# Patient Record
Sex: MALE | Race: WHITE | NOT HISPANIC OR LATINO | Employment: OTHER | URBAN - METROPOLITAN AREA
[De-identification: names, ages, dates, MRNs, and addresses within clinical notes are randomized per-mention and may not be internally consistent; named-entity substitution may affect disease eponyms.]

---

## 2017-02-01 ENCOUNTER — GENERIC CONVERSION - ENCOUNTER (OUTPATIENT)
Dept: OTHER | Facility: OTHER | Age: 72
End: 2017-02-01

## 2017-02-21 ENCOUNTER — ALLSCRIPTS OFFICE VISIT (OUTPATIENT)
Dept: OTHER | Facility: OTHER | Age: 72
End: 2017-02-21

## 2017-02-21 DIAGNOSIS — R01.1 CARDIAC MURMUR: ICD-10-CM

## 2017-02-28 ENCOUNTER — ALLSCRIPTS OFFICE VISIT (OUTPATIENT)
Dept: OTHER | Facility: OTHER | Age: 72
End: 2017-02-28

## 2017-03-03 ENCOUNTER — HOSPITAL ENCOUNTER (OUTPATIENT)
Dept: NON INVASIVE DIAGNOSTICS | Facility: CLINIC | Age: 72
Discharge: HOME | End: 2017-03-03
Payer: COMMERCIAL

## 2017-03-03 DIAGNOSIS — R01.1 CARDIAC MURMUR: ICD-10-CM

## 2017-03-03 PROCEDURE — 93306 TTE W/DOPPLER COMPLETE: CPT

## 2017-03-05 ENCOUNTER — GENERIC CONVERSION - ENCOUNTER (OUTPATIENT)
Dept: OTHER | Facility: OTHER | Age: 72
End: 2017-03-05

## 2017-03-28 ENCOUNTER — HOSPITAL ENCOUNTER (OUTPATIENT)
Dept: RADIOLOGY | Facility: CLINIC | Age: 72
Discharge: HOME | End: 2017-03-28
Payer: COMMERCIAL

## 2017-03-28 ENCOUNTER — TRANSCRIBE ORDERS (OUTPATIENT)
Dept: MRI IMAGING | Facility: CLINIC | Age: 72
End: 2017-03-28

## 2017-03-28 ENCOUNTER — ALLSCRIPTS OFFICE VISIT (OUTPATIENT)
Dept: OTHER | Facility: OTHER | Age: 72
End: 2017-03-28

## 2017-03-28 DIAGNOSIS — M70.20 OLECRANON BURSITIS: ICD-10-CM

## 2017-03-28 PROCEDURE — 73080 X-RAY EXAM OF ELBOW: CPT

## 2017-04-20 ENCOUNTER — ALLSCRIPTS OFFICE VISIT (OUTPATIENT)
Dept: OTHER | Facility: OTHER | Age: 72
End: 2017-04-20

## 2017-07-20 ENCOUNTER — GENERIC CONVERSION - ENCOUNTER (OUTPATIENT)
Dept: OTHER | Facility: OTHER | Age: 72
End: 2017-07-20

## 2017-07-20 LAB — AMBIG ABBREV LP DEFAULT (HISTORICAL): NORMAL

## 2017-07-21 ENCOUNTER — GENERIC CONVERSION - ENCOUNTER (OUTPATIENT)
Dept: OTHER | Facility: OTHER | Age: 72
End: 2017-07-21

## 2017-07-21 LAB
CHOLEST SERPL-MCNC: 118 MG/DL (ref 100–199)
HBA1C MFR BLD HPLC: 4.7 % (ref 4.8–5.6)
HDLC SERPL-MCNC: 38 MG/DL
LDLC SERPL CALC-MCNC: 60 MG/DL (ref 0–99)
LYME IGG/IGM AB (HISTORICAL): <0.91 ISR (ref 0–0.9)
LYME IGM (HISTORICAL): <0.8 INDEX (ref 0–0.79)
TRIGL SERPL-MCNC: 101 MG/DL (ref 0–149)
VLDLC SERPL CALC-MCNC: 20 MG/DL (ref 5–40)

## 2017-07-27 LAB — PLEASE NOTE (HISTORICAL): NORMAL

## 2017-08-02 ENCOUNTER — ALLSCRIPTS OFFICE VISIT (OUTPATIENT)
Dept: OTHER | Facility: OTHER | Age: 72
End: 2017-08-02

## 2017-09-20 ENCOUNTER — ALLSCRIPTS OFFICE VISIT (OUTPATIENT)
Dept: OTHER | Facility: OTHER | Age: 72
End: 2017-09-20

## 2018-01-11 NOTE — RESULT NOTES
Verified Results  ECHO COMPLETE WITH CONTRAST IF INDICATED [id number] 08:17AM Shweta Chandler Order Number: [id number]    - Patient Instructions: To schedule this appointment, please contact Central Scheduling at [phone]  Test Name Result Flag Reference   ECHO COMPLETE WITH CONTRAST IF INDICATED (Report)     [facility], [address] [place] [address]   [phone]     Transthoracic Echocardiogram   2D, M-mode, Doppler, and Color Doppler     Study date: 03-Mar-2017     Patient: Bobby Torres   MR number: [id number]   Account number: [de-identified]   : 1945   Age: 66 years   Gender: Male   Status: Outpatient   Location: [facility]   Height: 72 in   Weight: 216 lb   BP: 140/ 80 mmHg     Indications: Murmur  Diagnoses: R01 1 - Cardiac murmur, unspecified     Sonographer: Molina RCS   Primary Physician: Katherine Sparks MD   Referring Physician: Solitario Kessler MD   Group: [facility]   Interpreting Physician: Solitario Kessler MD     SUMMARY     LEFT VENTRICLE:   Although no diagnostic regional wall motion abnormality was identified, this possibility cannot be completely excluded on the basis of this study  LEFT ATRIUM:   The atrium was mildly dilated  AORTIC VALVE:   There was mild stenosis  Peak gradient 21 mm Hg and mean 12 mm Hg  TRICUSPID VALVE:   There was trace regurgitation  SUMMARY MEASUREMENTS   Unspecified Scan Mode measurements:   Aortic Valve:  HR was 52 {H  B }/min  Left Ventricle:  HR was 58 {H  B }/min  HISTORY: PRIOR HISTORY: Risk factors: hypertension and medication-treated hypercholesterolemia  PROCEDURE: The study was performed in the [address] [place] [address]  This was a routine study  The transthoracic approach was used  The study included complete 2D imaging, M-mode, complete spectral Doppler, and color Doppler  This   was a technically difficult study  LEFT VENTRICLE: Size was normal  Ejection fraction was estimated to be 60 %  Although no diagnostic regional wall motion abnormality was identified, this possibility cannot be completely excluded on the basis of this study  DOPPLER: Left   ventricular diastolic function parameters were normal      RIGHT VENTRICLE: The size was normal  Systolic function was normal  Wall thickness was normal      LEFT ATRIUM: The atrium was mildly dilated  RIGHT ATRIUM: Size was normal      MITRAL VALVE: There was annular calcification  Valve structure was normal  There was normal leaflet separation  DOPPLER: The transmitral velocity was within the normal range  There was no evidence for stenosis  There was no regurgitation  AORTIC VALVE: The valve was probably trileaflet  Leaflets exhibited calcification  DOPPLER: There was mild stenosis  Peak gradient 21 mm Hg and mean 12 mm Hg  TRICUSPID VALVE: The valve structure was normal  There was normal leaflet separation  DOPPLER: The transtricuspid velocity was within the normal range  There was no evidence for stenosis  There was trace regurgitation  PULMONIC VALVE: Leaflets exhibited normal thickness, no calcification, and normal cuspal separation  DOPPLER: The transpulmonic velocity was within the normal range  There was no regurgitation  PERICARDIUM: There was no pericardial effusion  The pericardium was normal in appearance  AORTA: The root exhibited normal size  SYSTEM MEASUREMENT TABLES     Apical four chamber   4 chamber Left Atrium Volume Index; Planimetry; End Systole; Apical four chamber;: 22 39 cm2   Left Ventricular End Diastolic Volume; Method of Disks, Single Plane; Apical four chamber;: 109 7 ml   Left Ventricular End Systolic Volume; Method of Disks, Single Plane; Apical four chamber;: 35 9 ml   Right Atrium Systolic Area; Planimetry; End Systole; Apical four chamber;: 16 19 cm2   Right Ventricular Internal Diastolic Dimension;  End Diastole; Apical four chamber;: 26 3 mm   TAPSE: 19 2 mm     Unspecified Scan Mode   Aortic Root Diameter; End Systole;: 32 6 mm   Aortic valve Area; Continuity Equation by Velocity Time Integral; Systole;: 1 6 cm2   Gradient Pressure, Peak; Simplified Bernoulli; Antegrade Flow; Systole;: 21 3 mm[Hg]   Gradient pressure, average; Simplified Bernoulli; Antegrade Flow; Systole;: 12 3 mm[Hg]   HR: 52 {H  B }/min   Left atrial diameter; End Diastole;: 48 3 mm   Cardiac Output; Systole;: 4 69 L/min   Cardiac Output; Teichholz; Systole;: 4 34 L/min   HR: 58 {H  B }/min   Heart rate; Teichholz;: 46 {H  B }/min   Interventricular Septum Diastolic Thickness; Teichholz; End Diastole;: 10 8 mm   Left Ventricle Internal End Diastolic Dimension; Teichholz;: 52 mm   Left Ventricle Internal Systolic Dimension; Teichholz; End Systole;: 33 1 mm   Left Ventricle Mass; Mass AVCube with Teichholz; End Diastole;: 230 g   Left Ventricle Posterior Wall Diastolic Thickness; Teichholz; End Diastole;: 11 9 mm   Left Ventricular Ejection Fraction; Teichholz;: 65 6 %   Left Ventricular End Diastolic Volume; Teichholz;: 129 5 ml   Left Ventricular End Systolic Volume; Teichholz;: 44 5 ml   Left Ventricular Fractional Shortening;: 36 3 %   Stroke volume; Systole;: 90 1 ml   Stroke volume;  Teichholz; Systole;: 85 ml   Mitral Valve Area; Area by Pressure Half-Time; Systole;: 1 61 cm2   Mitral Valve E to A Ratio; Systole;: 0 71     [facility] Accredited [facility]ch[facility]     Prepared and electronically signed by     Alaina Dueñas MD   Signed 03-Mar-2017 17:31:04

## 2018-01-13 VITALS
HEART RATE: 60 BPM | BODY MASS INDEX: 30.07 KG/M2 | WEIGHT: 222 LBS | HEIGHT: 72 IN | SYSTOLIC BLOOD PRESSURE: 122 MMHG | DIASTOLIC BLOOD PRESSURE: 74 MMHG

## 2018-01-13 VITALS
HEIGHT: 72 IN | HEART RATE: 56 BPM | DIASTOLIC BLOOD PRESSURE: 86 MMHG | SYSTOLIC BLOOD PRESSURE: 138 MMHG | BODY MASS INDEX: 31.29 KG/M2 | WEIGHT: 231 LBS | OXYGEN SATURATION: 96 %

## 2018-01-13 NOTE — PROGRESS NOTES
Plan  Health Maintenance    · Aspirin EC Low Dose 81 MG Oral Tablet Delayed Release; TAKE 1 TABLET DAILY  Low serum copper for age    · Copper Caps 2 MG Oral Capsule; as directed  Screen for colon cancer    · COLONOSCOPY ; every 10 years; Next 05ZAS2641; Status:Active    Discussion/Summary    AWV COMPLETED  PT DOING WELL  History of Present Illness  HPI: AVW   Welcome to Medicare and Wellness Visits: The patient is being seen for the subsequent annual wellness visit  Medicare Screening and Risk Factors   Hospitalizations: no previous hospitalizations  Medicare Screening Tests Risk Questions   Abdominal aortic aneurysm risk assessment: over 72years of age  Osteoporosis risk assessment: , over 48years of age and low calcium diet  HIV risk assessment: none indicated  Drug and Alcohol Use: The patient has never smoked cigarettes  The patient reports rare alcohol use  He has previously used illicit drugs  He reports using marijuana  Diet and Physical Activity: Current diet includes unhealthy food choices, frequent junk food, 1 servings of fruit per day, 1 servings of vegetables per day, 1 servings of meat per day, 1 servings of whole grains per day, 1 servings of dairy products per day, 2 cups of coffee per day, 0 cups of tea per day, 0 cans of regular soda per day and 0 cans of diet soda per day  He exercises daily  Exercise: walking 15-20 minutes per day  Mood Disorder and Cognitive Impairment Screening: PHQ-9 Depression Scale   Over the past 2 weeks, how often have you been bothered by the following problems? 1 ) Little interest or pleasure in doing things? Not at all    2 ) Feeling down, depressed or hopeless? Several days  3 ) Trouble falling asleep or sleeping too much? Nearly every day  4 ) Feeling tired or having little energy? Nearly every day  5 ) Poor appetite or overeating?  Not at all    6 ) Feeling bad about yourself, or that you are a failure, or have let yourself or your family down? Not at all    7 ) Trouble concentrating on things, such as reading a newspaper or watching television? Not at all    8 ) Moving or speaking so slowly that other people could have noticed, or the opposite, moving or speaking faster than usual? Not at all  TOTAL SCORE: 7, severity of depression is mild  How difficult have these problems made it for you to do your work, take care of things at home, or get along with people? Not at all  Functional Ability/Level of Safety: Hearing is normal in the right ear, slightly decreased in the left ear and a hearing aid is not used  The patient is currently able to do activities of daily living without limitations, able to do instrumental activities of daily living without limitations, able to participate in social activities without limitations and able to drive without limitations  Activities of daily living details: does not need help using the phone, no transportation help needed, does not need help shopping, no meal preparation help needed, does not need help doing housework, does not need help doing laundry, does not need help managing medications and does not need help managing money  Fall risk factors: The patient fell 1 times in the past 12 months  lost balance  Home safety risk factors:  loose rugs, but no unfamiliar surroundings, no poor household lighting, no uneven floors, no household clutter, grab bars in the bathroom and handrails on the stairs  Advance Directives: Advance directives: no living will, no durable power of  for health care directives and no advance directives  Co-Managers and Medical Equipment/Suppliers: See Patient Care Team   Preventive Quality Program 65 and Older: Falls Risk: The patient fell 1 times in the past 12 months  lost balance   Urinary Incontinence Symptoms includes: urinary incontinence and urinary urgency        Patient Care Team    Care Team Member Role Specialty Office Number   Saisimon Cole M D  Specialist Cardiology [phone]     Active Problems    1  Abnormal blood chemistry (790 6) (R79 9)   2  Allergic rhinitis (477 9) (J30 9)   3  LUKASZ positive (795 79) (R76 8)   4  Aortic stenosis (424 1) (I35 0)   5  Atherosclerotic heart disease of native coronary artery without angina pectoris (414 01)   (I25 10)   6  Benign essential hypertension (401 1) (I10)   7  Benign prostatic hypertrophy (600 00) (N40 0)   8  BPH with obstruction/lower urinary tract symptoms (600 01,599 69) (N40 1,N13 8)   9  Cardiac murmur (785 2) (R01 1)   10  Chest pain (786 50) (R07 9)   11  Chronic maxillary sinusitis (473 0) (J32 0)   12  Decreased libido (799 81) (R68 82)   13  Dyslipidemia (272 4) (E78 5)   14  Encounter for monitoring antiplatelet therapy (I03 39,W76 21) (Z51 81,Z79 02)   15  Encounter for special screening examination for genitourinary disorder (V81 6) (Z13 89)   16  Enlarged prostate (600 00) (N40 0)   17  Erectile dysfunction of non-organic origin (302 72) (F52 21)   18  Fatigue (780 79) (R53 83)   19  Gait instability (781 2) (R26 81)   20  Hyperlipidemia (272 4) (E78 5)   21  Hypertension (401 9) (I10)   22  Impaired fasting glucose (790 21) (R73 01)   23  Impaired functional mobility, balance, gait, and endurance (V49 89) (Z74 09)   24  Insomnia (780 52) (G47 00)   25  Laceration of right hand, initial encounter (882 0) (S61 411A)   26  Lightheadedness (780 4) (R42)   27  Male erectile dysfunction (607 84) (N52 9)   28  Onychomycosis (110 1) (B35 1)   29  Overweight (278 02) (E66 3)   30  Pulmonary nodule seen on imaging study (793 11) (R91 1)   31  Screen for colon cancer (V76 51) (Z12 11)   32  Screening for genitourinary condition (V81 6) (Z13 89)   33  Skin sensation disturbance (782 0) (R20 9)   34  Stroke (434 91) (I63 9)   35   Urinary incontinence (788 30) (R32)    Past Medical History    · History of Erectile dysfunction of non-organic origin (302 72) (F52 21)    Surgical History    · History of CABG   · History of Tonsillectomy    Family History  Mother    · Family history of Coronary Artery Disease (V17 49)   · Family history of Hypertension (V17 49)   · Family history of Stroke Syndrome (V17 1)  Father    · Family history of Aortic Valve Replacement   · Family history of Arthritis (V17 7)   · Family history of CABG  Brother    · Family history of Hypertension (V17 49)   · Family history of Stroke Syndrome (V17 1)    Social History    · Caffeine Use   · Former smoker (V15 82) (C25 114)   · Marital History - Single   · Tobacco Non-user    Current Meds   1  AmLODIPine Besylate 5 MG Oral Tablet; TAKE 1 TABLET DAILY; Therapy: 16JVD7663 to (Evaluate:65Qgr6957)  Requested for: 61QZZ7491; Last   Rx:12Jan2017 Ordered   2  Atorvastatin Calcium 40 MG Oral Tablet; Take 1 tablet daily; Therapy: 34JIJ3954 to (Evaluate:48Cgh1146)  Requested for: 10Aug2016; Last   Rx:10Aug2016 Ordered   3  Carvedilol 12 5 MG Oral Tablet; take one tablet by mouth twice daily; Therapy: 45TAY7060 to (Last Rx:10Aug2016)  Requested for: 10Aug2016 Ordered   4  Clopidogrel Bisulfate 75 MG Oral Tablet; TAKE 1 TABLET DAILY; Therapy: 60FPE4217 to (Evaluate:71Fxf3802)  Requested for: 10Aug2016; Last   Rx:10Aug2016 Ordered   5  [facility] Natural Fish Oil CAPS; Take as directed Recorded   6  Finasteride 5 MG Oral Tablet; TAKE 1 TABLET AT BEDTIME; Therapy: 85Dpf1646 to (Evaluate:50Hwe4102)  Requested for: 10Aug2016; Last   Rx:10Aug2016 Ordered   7  Mirtazapine 7 5 MG Oral Tablet; TAKE 1 TABLET AT BEDTIME; Therapy: 83Mnj0094 to (Evaluate:23Mar2017); Last Rx:25Tro8175 Ordered   8  Valsartan-Hydrochlorothiazide 320-25 MG Oral Tablet; Take 1 tablet daily; Therapy: 21Jun2016 to (Last Rx:56Vaj2633)  Requested for: 88AMK1263 Ordered   9  Vitamin D-3 5000 UNIT TABS; Therapy: 53DRU2129 to Recorded    Allergies    1   No Known Drug Allergies    Immunizations   1 2    Influenza  2014 20-Sep-2016    Pneumo Other  2014     Tdap  21-Mar-2016 Childhood Zoster  Never      Results/Data  Health Maintenance Flow Sheet 68FUI1545 01:09PM      Test Name Result Flag Reference   Colonoscopy pt stat never       *VB - Urinary Incontinence Screen (Dx Z13 89 Screen for UI) 76HBJ3497 01:07PM Justino Martini     Test Name Result Flag Reference   Urinary Incontinence Assessment 02/28/2017 yes       Falls Risk Assessment (Dx Z13 89 Screen for Neurologic Disorder) 20FIZ0588 12:53PM User, Ahs     Test Name Result Flag Reference   Falls Risk      No falls in the past year     PHQ-2 Adult Depression Screening 82Vnc9671 12:52PM User, Ahs     Test Name Result Flag Reference   PHQ-2 Adult Depression Score 2     Over the last two weeks, how often have you been bothered by any of the following problems? Little interest or pleasure in doing things: Several days - 1  Feeling down, depressed, or hopeless: Several days - 1   PHQ-2 Adult Depression Screening Negative         Health Management  Screen for colon cancer   COLONOSCOPY; every 10 years; Next Due: 41Wvh5871; Overdue  Health Maintenance   Medicare Annual Wellness Visit; every 1 year; Next Due: 66ZAH6026;  Overdue    Signatures   Electronically signed by : Abbe House, [facility]; Feb 28 2017  1:47PM EST                       (Author)    Electronically signed by : CORINNA Izaguirre ; Feb 28 2017  6:05PM EST

## 2018-01-14 VITALS
SYSTOLIC BLOOD PRESSURE: 124 MMHG | OXYGEN SATURATION: 96 % | WEIGHT: 231.13 LBS | BODY MASS INDEX: 31.31 KG/M2 | DIASTOLIC BLOOD PRESSURE: 66 MMHG | HEART RATE: 54 BPM | HEIGHT: 72 IN

## 2018-01-14 VITALS
HEART RATE: 71 BPM | BODY MASS INDEX: 30.12 KG/M2 | HEIGHT: 72 IN | SYSTOLIC BLOOD PRESSURE: 152 MMHG | DIASTOLIC BLOOD PRESSURE: 88 MMHG | WEIGHT: 222.38 LBS

## 2018-01-14 VITALS
BODY MASS INDEX: 29.26 KG/M2 | DIASTOLIC BLOOD PRESSURE: 80 MMHG | SYSTOLIC BLOOD PRESSURE: 140 MMHG | HEIGHT: 72 IN | HEART RATE: 66 BPM | WEIGHT: 216 LBS | OXYGEN SATURATION: 98 %

## 2018-01-16 NOTE — RESULT NOTES
Verified Results  (LC) Lyme Ab/Western Blot Reflex 23APT3973 11:46AM Jarred Carmenza     Test Name Result Flag Reference   Lyme IgG/IgM Ab <0 91 ISR  0 00-0 90   Negative         <0 91                                                 Equivocal  0 91 - 1 09                                                 Positive         >1 09   Lyme Disease Ab, Quant, IgM <0 80 index  0 00-0 79   Negative         <0 80                                                 Equivocal  0 80 - 1 19                                                 Positive         >1 19                  IgM levels may peak at 3-6 weeks post infection, then                  gradually decline

## 2018-01-16 NOTE — RESULT NOTES
Verified Results  (1) COMPREHENSIVE METABOLIC PANEL 92YMR1083 95:60MI Gail Cook     Test Name Result Flag Reference   Glucose, Serum 94 mg/dL  65-99   BUN 20 mg/dL  8-27   Creatinine, Serum 0 93 mg/dL  0 76-1 27   eGFR If NonAfricn Am 81 mL/min/1 73  >59   eGFR If Africn Am 94 mL/min/1 73  >59   BUN/Creatinine Ratio 22  10-22   Sodium, Serum 141 mmol/L  134-144   **Please note reference interval change**   Potassium, Serum 4 0 mmol/L  3 5-5 2   Chloride, Serum 97 mmol/L     **Please note reference interval change**   Carbon Dioxide, Total 28 mmol/L  18-29   Calcium, Serum 9 4 mg/dL  8 6-10 2   Protein, Total, Serum 6 9 g/dL  6 0-8 5   Albumin, Serum 4 2 g/dL  3 5-4 8   Globulin, Total 2 7 g/dL  1 5-4 5   A/G Ratio 1 6  1 1-2 5   Bilirubin, Total 1 2 mg/dL  0 0-1 2   Alkaline Phosphatase, S 108 IU/L     AST (SGOT) 27 IU/L  0-40   ALT (SGPT) 27 IU/L  0-44     (1) CK (CPK) 10JPX2432 07:20AM Gail Cook     Test Name Result Flag Reference   Creatine Kinase,Total,Serum 69 U/L       [facility]) Janell Sparks CMP14 Default 01JYN5839 07:20AM Gail Cook     Test Name Result Flag Reference   Janell Sparks CMP14 Default Comment     A hand-written panel/profile was received from your office  In  accordance with the [facility] Ambiguous Test Code Policy dated July 5869, we have completed your order by using the closest currently  or formerly recognized AMA panel  We have assigned Comprehensive  Metabolic Panel (14), Test Code #087852 to this request   If this  is not the testing you wished to receive on this specimen, please  contact the [address] [place] [address] Client Inquiry/Technical Services Department  to clarify the test order  We appreciate your business

## 2018-03-05 ENCOUNTER — OFFICE VISIT (OUTPATIENT)
Dept: INTERNAL MEDICINE CLINIC | Facility: CLINIC | Age: 73
End: 2018-03-05
Payer: COMMERCIAL

## 2018-03-05 VITALS
WEIGHT: 236.6 LBS | BODY MASS INDEX: 32.05 KG/M2 | HEART RATE: 58 BPM | HEIGHT: 72 IN | SYSTOLIC BLOOD PRESSURE: 124 MMHG | OXYGEN SATURATION: 97 % | DIASTOLIC BLOOD PRESSURE: 72 MMHG

## 2018-03-05 DIAGNOSIS — R73.01 IMPAIRED FASTING GLUCOSE: ICD-10-CM

## 2018-03-05 DIAGNOSIS — E78.2 MIXED HYPERLIPIDEMIA: ICD-10-CM

## 2018-03-05 DIAGNOSIS — I10 BENIGN ESSENTIAL HYPERTENSION: Primary | ICD-10-CM

## 2018-03-05 DIAGNOSIS — N13.8 BPH WITH OBSTRUCTION/LOWER URINARY TRACT SYMPTOMS: ICD-10-CM

## 2018-03-05 DIAGNOSIS — Z12.5 SCREENING FOR PROSTATE CANCER: ICD-10-CM

## 2018-03-05 DIAGNOSIS — I25.10 ATHEROSCLEROSIS OF NATIVE CORONARY ARTERY OF NATIVE HEART WITHOUT ANGINA PECTORIS: ICD-10-CM

## 2018-03-05 DIAGNOSIS — I63.411 CEREBROVASCULAR ACCIDENT (CVA) DUE TO EMBOLISM OF RIGHT MIDDLE CEREBRAL ARTERY (HCC): ICD-10-CM

## 2018-03-05 DIAGNOSIS — N40.1 BPH WITH OBSTRUCTION/LOWER URINARY TRACT SYMPTOMS: ICD-10-CM

## 2018-03-05 DIAGNOSIS — F51.02 ADJUSTMENT INSOMNIA: ICD-10-CM

## 2018-03-05 DIAGNOSIS — E78.5 DYSLIPIDEMIA: ICD-10-CM

## 2018-03-05 DIAGNOSIS — Z12.11 SCREEN FOR COLON CANCER: ICD-10-CM

## 2018-03-05 PROCEDURE — 1101F PT FALLS ASSESS-DOCD LE1/YR: CPT | Performed by: INTERNAL MEDICINE

## 2018-03-05 PROCEDURE — 3725F SCREEN DEPRESSION PERFORMED: CPT | Performed by: INTERNAL MEDICINE

## 2018-03-05 PROCEDURE — 99214 OFFICE O/P EST MOD 30 MIN: CPT | Performed by: INTERNAL MEDICINE

## 2018-03-05 RX ORDER — MIRTAZAPINE 15 MG/1
1 TABLET, FILM COATED ORAL
COMMUNITY
Start: 2017-02-21 | End: 2018-03-05 | Stop reason: SDUPTHER

## 2018-03-05 RX ORDER — MAG HYDROX/ALUMINUM HYD/SIMETH 400-400-40
SUSPENSION, ORAL (FINAL DOSE FORM) ORAL DAILY
COMMUNITY
Start: 2014-01-13

## 2018-03-05 RX ORDER — ATORVASTATIN CALCIUM 40 MG/1
1 TABLET, FILM COATED ORAL DAILY
COMMUNITY
Start: 2013-06-03 | End: 2018-09-10 | Stop reason: SDUPTHER

## 2018-03-05 RX ORDER — AMLODIPINE BESYLATE 5 MG/1
1 TABLET ORAL DAILY
COMMUNITY
Start: 2016-11-02 | End: 2018-09-10 | Stop reason: SDUPTHER

## 2018-03-05 RX ORDER — ASPIRIN 81 MG/1
1 TABLET ORAL DAILY
COMMUNITY
Start: 2017-02-28

## 2018-03-05 RX ORDER — FINASTERIDE 5 MG/1
1 TABLET, FILM COATED ORAL
COMMUNITY
Start: 2014-02-20 | End: 2018-09-10 | Stop reason: SDUPTHER

## 2018-03-05 RX ORDER — VALSARTAN AND HYDROCHLOROTHIAZIDE 320; 25 MG/1; MG/1
1 TABLET, FILM COATED ORAL DAILY
COMMUNITY
Start: 2016-06-21 | End: 2018-08-21 | Stop reason: ALTCHOICE

## 2018-03-05 RX ORDER — MIRTAZAPINE 15 MG/1
15 TABLET, FILM COATED ORAL DAILY
Qty: 90 TABLET | Refills: 3 | Status: SHIPPED | OUTPATIENT
Start: 2018-03-05 | End: 2018-09-10 | Stop reason: SDUPTHER

## 2018-03-05 RX ORDER — CARVEDILOL 12.5 MG/1
1 TABLET ORAL 2 TIMES DAILY
COMMUNITY
Start: 2011-10-03 | End: 2018-09-10 | Stop reason: SDUPTHER

## 2018-03-05 RX ORDER — CLOPIDOGREL BISULFATE 75 MG/1
1 TABLET ORAL DAILY
COMMUNITY
Start: 2016-02-17 | End: 2018-07-01 | Stop reason: SDUPTHER

## 2018-03-05 NOTE — PROGRESS NOTES
Assessment/Plan:    Hyperlipidemia  Check cmp ck lipids per dr Abner Velez order    Impaired fasting glucose  Check fbs and a1c    Atherosclerotic heart disease of native coronary artery without angina pectoris  Stable follow with dr Loren De Los Santos    Stroke [facility])  Discussed referral to pt for his gait issues but he prefers to see Dr Sandeep Hines first     BPH with obstruction/lower urinary tract symptoms  Stable on finasteride    Gait instability  discissed pt but he will wait for appt with Dr Sandeep Hines    Insomnia  Increase mirtazapine to 15 mg po qhs    Benign essential hypertension  Well controlled  Check bmp    Screening for prostate cancer  psa ordered  misty at follow up    Screen for colon cancer  Order colonoscopy  He has never done one  Diagnoses and all orders for this visit:    Benign essential hypertension  -     Comprehensive metabolic panel; Future  -     CBC and differential; Future  -     TSH, 3rd generation; Future    Atherosclerosis of native coronary artery of native heart without angina pectoris  -     Comprehensive metabolic panel; Future    Cerebrovascular accident (CVA) due to embolism of right middle cerebral artery (HCC)  -     Comprehensive metabolic panel; Future    BPH with obstruction/lower urinary tract symptoms    Dyslipidemia    Mixed hyperlipidemia  -     Comprehensive metabolic panel; Future    Adjustment insomnia  -     mirtazapine (REMERON) 15 mg tablet; Take 1 tablet (15 mg total) by mouth daily  -     TSH, 3rd generation; Future    Impaired fasting glucose  -     Comprehensive metabolic panel; Future  -     HEMOGLOBIN A1C W/ EAG ESTIMATION; Future    Screening for prostate cancer  -     PSA; Future    Screen for colon cancer  -     Ambulatory referral to Gastroenterology; Future    Other orders  -     amLODIPine (NORVASC) 5 mg tablet; Take 1 tablet by mouth daily  -     aspirin (ASPIRIN LOW DOSE) 81 mg EC tablet; Take 1 tablet by mouth daily  -     atorvastatin (LIPITOR) 40 mg tablet;  Take 1 tablet by mouth daily  -     carvedilol (COREG) 12 5 mg tablet; Take 1 tablet by mouth 2 (two) times a day  -     clopidogrel (PLAVIX) 75 mg tablet; Take 1 tablet by mouth daily  -     finasteride (PROSCAR) 5 mg tablet; Take 1 tablet by mouth  -     valsartan-hydrochlorothiazide (DIOVAN-HCT) 320-25 MG per tablet; Take 1 tablet by mouth daily  -     Cholecalciferol (VITAMIN D3) 5000 units CAPS; Take by mouth  -     Discontinue: mirtazapine (REMERON) 15 mg tablet; Take 1 tablet by mouth  -     Omega-3 Fatty Acids (FISH OIL) 1200 MG CPDR; Take 1 capsule by mouth daily          Subjective:      Patient ID: Jessica Nielsen is a 74 y o  male  Patient presents in follow up for his medical problems  He has not had blood work in some  He has problems with his balance since his stroke  He has not pursued PT over the winter due to weather conditions  He has problems with insomnia and his Neurologist, Dr Choco Neves gave him mirtazapine 7 5mg which helps him most nights but at least 2-3 nights he has problems falling and staying asleep  The following portions of the patient's history were reviewed and updated as appropriate: allergies, current medications, past family history, past medical history, past social history, past surgical history and problem list     Review of Systems   Constitutional: Negative for activity change, appetite change, chills, diaphoresis, fatigue, fever and unexpected weight change  HENT: Negative for congestion, dental problem, drooling, ear discharge, ear pain, facial swelling, hearing loss, mouth sores, nosebleeds, postnasal drip, rhinorrhea, sinus pain, sinus pressure, sneezing, sore throat, tinnitus, trouble swallowing and voice change  Eyes: Negative for photophobia, pain, discharge, redness, itching and visual disturbance  Respiratory: Negative for apnea, cough, choking, chest tightness, shortness of breath, wheezing and stridor      Cardiovascular: Negative for chest pain, palpitations and leg swelling  Gastrointestinal: Negative for abdominal distention, abdominal pain, anal bleeding, blood in stool, constipation, diarrhea, nausea, rectal pain and vomiting  Endocrine: Negative for cold intolerance, heat intolerance, polydipsia, polyphagia and polyuria  Genitourinary: Negative for decreased urine volume, difficulty urinating, dysuria, enuresis, flank pain, frequency, genital sores, hematuria and urgency  Musculoskeletal: Negative for arthralgias, back pain, gait problem, joint swelling, myalgias, neck pain and neck stiffness  Skin: Negative for color change, pallor, rash and wound  Allergic/Immunologic: Negative for environmental allergies, food allergies and immunocompromised state  Neurological: Negative for dizziness, tremors, seizures, syncope, facial asymmetry, speech difficulty, weakness, light-headedness, numbness and headaches  Gait instability   Hematological: Negative for adenopathy  Does not bruise/bleed easily  Psychiatric/Behavioral: Negative for agitation, self-injury, sleep disturbance and suicidal ideas  The patient is not nervous/anxious  Insomnia           Objective:      /72   Pulse 58   Ht 6' (1 829 m)   Wt 107 kg (236 lb 9 6 oz)   SpO2 97%   BMI 32 09 kg/m²          Physical Exam   Constitutional: He is oriented to person, place, and time  He appears well-developed and well-nourished  No distress  HENT:   Head: Normocephalic and atraumatic  Right Ear: External ear normal    Left Ear: External ear normal    Mouth/Throat: Oropharynx is clear and moist    Eyes: Conjunctivae and EOM are normal  Pupils are equal, round, and reactive to light  No scleral icterus  Neck: Normal range of motion  Neck supple  No JVD present  No tracheal deviation present  No thyromegaly present  Cardiovascular: Normal rate, regular rhythm and intact distal pulses  Exam reveals no gallop and no friction rub      No murmur heard   Pulmonary/Chest: Effort normal and breath sounds normal  No respiratory distress  He has no wheezes  He has no rales  He exhibits no tenderness  Abdominal: Soft  Bowel sounds are normal  He exhibits no distension and no mass  There is no tenderness  There is no rebound and no guarding  Musculoskeletal: Normal range of motion  He exhibits no edema, tenderness or deformity  Lymphadenopathy:     He has no cervical adenopathy  Neurological: He is alert and oriented to person, place, and time  He has normal reflexes  No cranial nerve deficit  Coordination normal    Skin: Skin is warm and dry  No rash noted  He is not diaphoretic  No erythema  No pallor  Psychiatric: He has a normal mood and affect   His behavior is normal  Judgment and thought content normal

## 2018-04-26 LAB
ALBUMIN SERPL-MCNC: 4.3 G/DL (ref 3.5–4.8)
ALBUMIN/GLOB SERPL: 1.4 {RATIO} (ref 1.2–2.2)
ALP SERPL-CCNC: 110 IU/L (ref 39–117)
ALT SERPL-CCNC: 42 IU/L (ref 0–44)
AST SERPL-CCNC: 35 IU/L (ref 0–40)
BASOPHILS # BLD AUTO: 0 X10E3/UL (ref 0–0.2)
BASOPHILS NFR BLD AUTO: 0 %
BILIRUB SERPL-MCNC: 1.4 MG/DL (ref 0–1.2)
BUN SERPL-MCNC: 20 MG/DL (ref 8–27)
BUN/CREAT SERPL: 21 (ref 10–24)
CALCIUM SERPL-MCNC: 9.4 MG/DL (ref 8.6–10.2)
CHLORIDE SERPL-SCNC: 99 MMOL/L (ref 96–106)
CO2 SERPL-SCNC: 25 MMOL/L (ref 18–29)
CREAT SERPL-MCNC: 0.95 MG/DL (ref 0.76–1.27)
EOSINOPHIL # BLD AUTO: 0.1 X10E3/UL (ref 0–0.4)
EOSINOPHIL NFR BLD AUTO: 2 %
ERYTHROCYTE [DISTWIDTH] IN BLOOD BY AUTOMATED COUNT: 12.6 % (ref 12.3–15.4)
EST. AVERAGE GLUCOSE BLD GHB EST-MCNC: 91 MG/DL
GLOBULIN SER-MCNC: 3 G/DL (ref 1.5–4.5)
GLUCOSE SERPL-MCNC: 96 MG/DL (ref 65–99)
HBA1C MFR BLD: 4.8 % (ref 4.8–5.6)
HCT VFR BLD AUTO: 43.2 % (ref 37.5–51)
HGB BLD-MCNC: 15.5 G/DL (ref 13–17.7)
IMM GRANULOCYTES # BLD: 0 X10E3/UL (ref 0–0.1)
IMM GRANULOCYTES NFR BLD: 0 %
LYMPHOCYTES # BLD AUTO: 1.7 X10E3/UL (ref 0.7–3.1)
LYMPHOCYTES NFR BLD AUTO: 24 %
MCH RBC QN AUTO: 32.9 PG (ref 26.6–33)
MCHC RBC AUTO-ENTMCNC: 35.9 G/DL (ref 31.5–35.7)
MCV RBC AUTO: 92 FL (ref 79–97)
MONOCYTES # BLD AUTO: 0.6 X10E3/UL (ref 0.1–0.9)
MONOCYTES NFR BLD AUTO: 9 %
NEUTROPHILS # BLD AUTO: 4.6 X10E3/UL (ref 1.4–7)
NEUTROPHILS NFR BLD AUTO: 65 %
PLATELET # BLD AUTO: 198 X10E3/UL (ref 150–379)
POTASSIUM SERPL-SCNC: 4.3 MMOL/L (ref 3.5–5.2)
PROT SERPL-MCNC: 7.3 G/DL (ref 6–8.5)
PSA SERPL-MCNC: 0.2 NG/ML (ref 0–4)
RBC # BLD AUTO: 4.71 X10E6/UL (ref 4.14–5.8)
SL AMB EGFR AFRICAN AMERICAN: 91 ML/MIN/1.73
SL AMB EGFR NON AFRICAN AMERICAN: 79 ML/MIN/1.73
SODIUM SERPL-SCNC: 140 MMOL/L (ref 134–144)
TSH SERPL DL<=0.005 MIU/L-ACNC: 4.85 UIU/ML (ref 0.45–4.5)
WBC # BLD AUTO: 7.1 X10E3/UL (ref 3.4–10.8)

## 2018-05-29 DIAGNOSIS — J30.1 SEASONAL ALLERGIC RHINITIS DUE TO POLLEN: Primary | ICD-10-CM

## 2018-05-29 RX ORDER — FLUTICASONE PROPIONATE 50 MCG
SPRAY, SUSPENSION (ML) NASAL
Qty: 1 BOTTLE | Refills: 5 | Status: SHIPPED | OUTPATIENT
Start: 2018-05-29 | End: 2019-09-17 | Stop reason: ALTCHOICE

## 2018-07-01 DIAGNOSIS — I25.10 CORONARY ARTERY DISEASE, ANGINA PRESENCE UNSPECIFIED, UNSPECIFIED VESSEL OR LESION TYPE, UNSPECIFIED WHETHER NATIVE OR TRANSPLANTED HEART: Primary | ICD-10-CM

## 2018-07-02 RX ORDER — CLOPIDOGREL BISULFATE 75 MG/1
TABLET ORAL
Qty: 90 TABLET | Refills: 2 | Status: SHIPPED | OUTPATIENT
Start: 2018-07-02 | End: 2018-09-10 | Stop reason: SDUPTHER

## 2018-08-21 ENCOUNTER — TELEPHONE (OUTPATIENT)
Dept: INTERNAL MEDICINE CLINIC | Facility: CLINIC | Age: 73
End: 2018-08-21

## 2018-08-21 DIAGNOSIS — I10 ESSENTIAL HYPERTENSION: Primary | ICD-10-CM

## 2018-08-21 RX ORDER — OLMESARTAN MEDOXOMIL AND HYDROCHLOROTHIAZIDE 40/25 40; 25 MG/1; MG/1
1 TABLET ORAL DAILY
Qty: 90 TABLET | Refills: 3 | Status: SHIPPED | OUTPATIENT
Start: 2018-08-21 | End: 2019-01-11

## 2018-08-21 NOTE — TELEPHONE ENCOUNTER
Patient called-he just got a letter stating his Valsartan was recalled  He's asking if we can send something else over for him? 90 day supply, please      [facility]

## 2018-09-10 ENCOUNTER — OFFICE VISIT (OUTPATIENT)
Dept: INTERNAL MEDICINE CLINIC | Facility: CLINIC | Age: 73
End: 2018-09-10
Payer: COMMERCIAL

## 2018-09-10 VITALS
SYSTOLIC BLOOD PRESSURE: 124 MMHG | RESPIRATION RATE: 14 BRPM | DIASTOLIC BLOOD PRESSURE: 76 MMHG | OXYGEN SATURATION: 97 % | HEIGHT: 72 IN | WEIGHT: 232 LBS | BODY MASS INDEX: 31.42 KG/M2 | HEART RATE: 63 BPM

## 2018-09-10 DIAGNOSIS — I25.10 CORONARY ARTERY DISEASE, ANGINA PRESENCE UNSPECIFIED, UNSPECIFIED VESSEL OR LESION TYPE, UNSPECIFIED WHETHER NATIVE OR TRANSPLANTED HEART: ICD-10-CM

## 2018-09-10 DIAGNOSIS — E78.2 MIXED HYPERLIPIDEMIA: ICD-10-CM

## 2018-09-10 DIAGNOSIS — Z12.11 SCREEN FOR COLON CANCER: ICD-10-CM

## 2018-09-10 DIAGNOSIS — I10 BENIGN ESSENTIAL HYPERTENSION: ICD-10-CM

## 2018-09-10 DIAGNOSIS — I25.10 ATHEROSCLEROSIS OF NATIVE CORONARY ARTERY OF NATIVE HEART WITHOUT ANGINA PECTORIS: ICD-10-CM

## 2018-09-10 DIAGNOSIS — Z12.5 SCREENING FOR PROSTATE CANCER: ICD-10-CM

## 2018-09-10 DIAGNOSIS — R73.01 IMPAIRED FASTING GLUCOSE: ICD-10-CM

## 2018-09-10 DIAGNOSIS — Z12.11 SCREENING FOR COLON CANCER: ICD-10-CM

## 2018-09-10 DIAGNOSIS — Z12.11 SCREEN FOR COLON CANCER: Primary | ICD-10-CM

## 2018-09-10 DIAGNOSIS — E03.9 ACQUIRED HYPOTHYROIDISM: ICD-10-CM

## 2018-09-10 DIAGNOSIS — F51.02 ADJUSTMENT INSOMNIA: ICD-10-CM

## 2018-09-10 PROCEDURE — 4040F PNEUMOC VAC/ADMIN/RCVD: CPT | Performed by: INTERNAL MEDICINE

## 2018-09-10 PROCEDURE — 99214 OFFICE O/P EST MOD 30 MIN: CPT | Performed by: INTERNAL MEDICINE

## 2018-09-10 PROCEDURE — 1160F RVW MEDS BY RX/DR IN RCRD: CPT | Performed by: INTERNAL MEDICINE

## 2018-09-10 PROCEDURE — 3008F BODY MASS INDEX DOCD: CPT | Performed by: INTERNAL MEDICINE

## 2018-09-10 PROCEDURE — 3074F SYST BP LT 130 MM HG: CPT | Performed by: INTERNAL MEDICINE

## 2018-09-10 PROCEDURE — 3078F DIAST BP <80 MM HG: CPT | Performed by: INTERNAL MEDICINE

## 2018-09-10 PROCEDURE — 1036F TOBACCO NON-USER: CPT | Performed by: INTERNAL MEDICINE

## 2018-09-10 RX ORDER — FINASTERIDE 5 MG/1
5 TABLET, FILM COATED ORAL DAILY
Qty: 90 TABLET | Refills: 1 | Status: SHIPPED | OUTPATIENT
Start: 2018-09-10 | End: 2018-09-10 | Stop reason: SDUPTHER

## 2018-09-10 RX ORDER — CARVEDILOL 12.5 MG/1
12.5 TABLET ORAL 2 TIMES DAILY
Qty: 180 TABLET | Refills: 0 | Status: SHIPPED | OUTPATIENT
Start: 2018-09-10 | End: 2018-09-11 | Stop reason: SDUPTHER

## 2018-09-10 RX ORDER — FINASTERIDE 5 MG/1
5 TABLET, FILM COATED ORAL DAILY
Qty: 90 TABLET | Refills: 0 | Status: SHIPPED | OUTPATIENT
Start: 2018-09-10 | End: 2018-09-11 | Stop reason: SDUPTHER

## 2018-09-10 RX ORDER — ATORVASTATIN CALCIUM 40 MG/1
40 TABLET, FILM COATED ORAL DAILY
Qty: 90 TABLET | Refills: 1 | Status: SHIPPED | OUTPATIENT
Start: 2018-09-10 | End: 2018-09-10 | Stop reason: SDUPTHER

## 2018-09-10 RX ORDER — MIRTAZAPINE 15 MG/1
15 TABLET, FILM COATED ORAL DAILY
Qty: 90 TABLET | Refills: 1 | Status: SHIPPED | OUTPATIENT
Start: 2018-09-10 | End: 2020-01-15 | Stop reason: SDUPTHER

## 2018-09-10 RX ORDER — ATORVASTATIN CALCIUM 40 MG/1
40 TABLET, FILM COATED ORAL DAILY
Qty: 90 TABLET | Refills: 0 | Status: SHIPPED | OUTPATIENT
Start: 2018-09-10 | End: 2018-09-11 | Stop reason: SDUPTHER

## 2018-09-10 RX ORDER — CARVEDILOL 12.5 MG/1
12.5 TABLET ORAL 2 TIMES DAILY
Qty: 180 TABLET | Refills: 1 | Status: SHIPPED | OUTPATIENT
Start: 2018-09-10 | End: 2018-09-10 | Stop reason: SDUPTHER

## 2018-09-10 RX ORDER — CLOPIDOGREL BISULFATE 75 MG/1
75 TABLET ORAL DAILY
Qty: 90 TABLET | Refills: 1 | Status: SHIPPED | OUTPATIENT
Start: 2018-09-10 | End: 2019-03-18 | Stop reason: SDUPTHER

## 2018-09-10 RX ORDER — AMLODIPINE BESYLATE 5 MG/1
5 TABLET ORAL DAILY
Qty: 90 TABLET | Refills: 1 | Status: SHIPPED | OUTPATIENT
Start: 2018-09-10 | End: 2019-03-18 | Stop reason: SDUPTHER

## 2018-09-10 NOTE — TELEPHONE ENCOUNTER
Patient said he uses [facility] mail order pharmacy for the following med's        Atorvastatin 40 mg  3 mos supply  Finasteride 5 mg QD  Carvedilol 12 5 mg BID    And the rest of his meds go to [facility]

## 2018-09-10 NOTE — PROGRESS NOTES
Assessment/Plan:    No problem-specific Assessment & Plan notes found for this encounter  Diagnoses and all orders for this visit:    Screening for colon cancer  -     Cologuard    Impaired fasting glucose  -     Comprehensive metabolic panel; Future    Atherosclerosis of native coronary artery of native heart without angina pectoris    Benign essential hypertension  -     Comprehensive metabolic panel; Future    Mixed hyperlipidemia  -     Comprehensive metabolic panel; Future  -     CK; Future  -     Lipid Panel with Direct LDL reflex; Future    Screening for prostate cancer    Screen for colon cancer    Acquired hypothyroidism  -     CBC and differential; Future  -     Anti-microsomal antibody; Future  -     TSH, 3rd generation; Future    Other orders  -     Cancel: Ambulatory referral to Gastroenterology; Future          Subjective:      Patient ID: Jose Paredes is a 74 y o  male  Patient presents in follow up for his medical problems and to review recent lab work  He says that he has had problems with his balance lately and attributes that to a lack of exercise  He had been referred to pt by his neurologist but never followed through  He was referred for colonoscopy but didn't pursue that because he doesn't want to do the prep  The following portions of the patient's history were reviewed and updated as appropriate: allergies, current medications, past family history, past medical history, past social history, past surgical history and problem list     Review of Systems      Objective:      /82   Pulse 63   Resp 14   Ht 6' (1 829 m)   Wt 105 kg (232 lb)   SpO2 97%   BMI 31 46 kg/m²          Physical Exam   Constitutional: He is oriented to person, place, and time  He appears well-developed and well-nourished  No distress  HENT:   Head: Normocephalic and atraumatic     Right Ear: External ear normal    Left Ear: External ear normal    Mouth/Throat: Oropharynx is clear and moist  No oropharyngeal exudate  Eyes: Conjunctivae and EOM are normal  Pupils are equal, round, and reactive to light  Right eye exhibits no discharge  Left eye exhibits no discharge  No scleral icterus  Neck: Normal range of motion  Neck supple  No JVD present  No tracheal deviation present  No thyromegaly present  Carotid bruit on the right   Cardiovascular: Normal rate, regular rhythm and intact distal pulses  Exam reveals no gallop and no friction rub  Murmur heard  Pulmonary/Chest: Effort normal and breath sounds normal  No stridor  No respiratory distress  He has no wheezes  He has no rales  He exhibits no tenderness  Abdominal: Soft  Bowel sounds are normal  He exhibits no distension and no mass  There is no tenderness  There is no rebound and no guarding  Genitourinary:   Genitourinary Comments: Nemg  Testes descended bilaterally without masses  No penile discharge or masses  Nl rectal tone  The prostate is small and smooth and without masses  Musculoskeletal: Normal range of motion  He exhibits no edema, tenderness or deformity  Lymphadenopathy:     He has no cervical adenopathy  Neurological: He is alert and oriented to person, place, and time  He has normal reflexes  No cranial nerve deficit  He exhibits normal muscle tone  Coordination normal    Skin: Skin is warm and dry  No rash noted  He is not diaphoretic  No erythema  No pallor  Psychiatric: He has a normal mood and affect   His behavior is normal  Judgment and thought content normal

## 2018-09-11 DIAGNOSIS — Z12.11 SCREEN FOR COLON CANCER: ICD-10-CM

## 2018-09-11 DIAGNOSIS — E78.2 MIXED HYPERLIPIDEMIA: ICD-10-CM

## 2018-09-11 DIAGNOSIS — I10 BENIGN ESSENTIAL HYPERTENSION: ICD-10-CM

## 2018-09-11 RX ORDER — FINASTERIDE 5 MG/1
5 TABLET, FILM COATED ORAL DAILY
Qty: 90 TABLET | Refills: 0 | Status: SHIPPED | OUTPATIENT
Start: 2018-09-11 | End: 2019-03-18 | Stop reason: SDUPTHER

## 2018-09-11 RX ORDER — CARVEDILOL 12.5 MG/1
12.5 TABLET ORAL 2 TIMES DAILY
Qty: 180 TABLET | Refills: 0 | Status: SHIPPED | OUTPATIENT
Start: 2018-09-11 | End: 2019-03-18 | Stop reason: SDUPTHER

## 2018-09-11 RX ORDER — ATORVASTATIN CALCIUM 40 MG/1
40 TABLET, FILM COATED ORAL DAILY
Qty: 90 TABLET | Refills: 0 | Status: SHIPPED | OUTPATIENT
Start: 2018-09-11 | End: 2019-03-18 | Stop reason: SDUPTHER

## 2018-10-27 LAB
ALBUMIN SERPL-MCNC: 4.2 G/DL (ref 3.5–4.8)
ALBUMIN/GLOB SERPL: 1.5 {RATIO} (ref 1.2–2.2)
ALP SERPL-CCNC: 99 IU/L (ref 39–117)
ALT SERPL-CCNC: 41 IU/L (ref 0–44)
AST SERPL-CCNC: 38 IU/L (ref 0–40)
BASOPHILS # BLD AUTO: 0 X10E3/UL (ref 0–0.2)
BASOPHILS NFR BLD AUTO: 0 %
BILIRUB SERPL-MCNC: 1.1 MG/DL (ref 0–1.2)
BUN SERPL-MCNC: 16 MG/DL (ref 8–27)
BUN/CREAT SERPL: 17 (ref 10–24)
CALCIUM SERPL-MCNC: 9.3 MG/DL (ref 8.6–10.2)
CHLORIDE SERPL-SCNC: 98 MMOL/L (ref 96–106)
CHOLEST SERPL-MCNC: 124 MG/DL (ref 100–199)
CK SERPL-CCNC: 85 U/L (ref 24–204)
CO2 SERPL-SCNC: 26 MMOL/L (ref 20–29)
CREAT SERPL-MCNC: 0.93 MG/DL (ref 0.76–1.27)
EOSINOPHIL # BLD AUTO: 0.1 X10E3/UL (ref 0–0.4)
EOSINOPHIL NFR BLD AUTO: 2 %
ERYTHROCYTE [DISTWIDTH] IN BLOOD BY AUTOMATED COUNT: 13.4 % (ref 12.3–15.4)
GLOBULIN SER-MCNC: 2.8 G/DL (ref 1.5–4.5)
GLUCOSE SERPL-MCNC: 105 MG/DL (ref 65–99)
HCT VFR BLD AUTO: 44.2 % (ref 37.5–51)
HDLC SERPL-MCNC: 34 MG/DL
HGB BLD-MCNC: 14.9 G/DL (ref 13–17.7)
IMM GRANULOCYTES # BLD: 0 X10E3/UL (ref 0–0.1)
IMM GRANULOCYTES NFR BLD: 0 %
LDLC SERPL CALC-MCNC: 61 MG/DL (ref 0–99)
LYMPHOCYTES # BLD AUTO: 2 X10E3/UL (ref 0.7–3.1)
LYMPHOCYTES NFR BLD AUTO: 26 %
MCH RBC QN AUTO: 32.2 PG (ref 26.6–33)
MCHC RBC AUTO-ENTMCNC: 33.7 G/DL (ref 31.5–35.7)
MCV RBC AUTO: 96 FL (ref 79–97)
MONOCYTES # BLD AUTO: 0.7 X10E3/UL (ref 0.1–0.9)
MONOCYTES NFR BLD AUTO: 9 %
NEUTROPHILS # BLD AUTO: 4.7 X10E3/UL (ref 1.4–7)
NEUTROPHILS NFR BLD AUTO: 63 %
PLATELET # BLD AUTO: 205 X10E3/UL (ref 150–379)
POTASSIUM SERPL-SCNC: 4.2 MMOL/L (ref 3.5–5.2)
PROT SERPL-MCNC: 7 G/DL (ref 6–8.5)
RBC # BLD AUTO: 4.63 X10E6/UL (ref 4.14–5.8)
SL AMB EGFR AFRICAN AMERICAN: 93 ML/MIN/1.73
SL AMB EGFR NON AFRICAN AMERICAN: 80 ML/MIN/1.73
SL AMB VLDL CHOLESTEROL CALC: 29 MG/DL (ref 5–40)
SODIUM SERPL-SCNC: 138 MMOL/L (ref 134–144)
THYROPEROXIDASE AB SERPL-ACNC: 11 IU/ML (ref 0–34)
TRIGL SERPL-MCNC: 143 MG/DL (ref 0–149)
TSH SERPL DL<=0.005 MIU/L-ACNC: 5.66 UIU/ML (ref 0.45–4.5)
WBC # BLD AUTO: 7.6 X10E3/UL (ref 3.4–10.8)

## 2019-01-11 ENCOUNTER — TELEPHONE (OUTPATIENT)
Dept: INTERNAL MEDICINE CLINIC | Facility: CLINIC | Age: 74
End: 2019-01-11

## 2019-01-11 DIAGNOSIS — I10 ESSENTIAL HYPERTENSION: Primary | ICD-10-CM

## 2019-01-11 RX ORDER — LOSARTAN POTASSIUM AND HYDROCHLOROTHIAZIDE 25; 100 MG/1; MG/1
1 TABLET ORAL DAILY
Qty: 30 TABLET | Refills: 1 | Status: SHIPPED | OUTPATIENT
Start: 2019-01-11 | End: 2019-03-08 | Stop reason: SDUPTHER

## 2019-01-11 NOTE — TELEPHONE ENCOUNTER
Zach from [facility] called in regards to pt's script for olmesartan-HCTZ 40-25mg, medication is on backorder, pt is requesting an alternative       Send to [employer] [facility]

## 2019-03-08 DIAGNOSIS — I10 ESSENTIAL HYPERTENSION: ICD-10-CM

## 2019-03-08 RX ORDER — LOSARTAN POTASSIUM AND HYDROCHLOROTHIAZIDE 25; 100 MG/1; MG/1
TABLET ORAL
Qty: 30 TABLET | Refills: 1 | Status: SHIPPED | OUTPATIENT
Start: 2019-03-08 | End: 2019-03-18 | Stop reason: ALTCHOICE

## 2019-03-18 ENCOUNTER — OFFICE VISIT (OUTPATIENT)
Dept: INTERNAL MEDICINE CLINIC | Facility: CLINIC | Age: 74
End: 2019-03-18
Payer: COMMERCIAL

## 2019-03-18 VITALS
BODY MASS INDEX: 31.83 KG/M2 | WEIGHT: 235 LBS | HEIGHT: 72 IN | HEART RATE: 65 BPM | DIASTOLIC BLOOD PRESSURE: 90 MMHG | OXYGEN SATURATION: 98 % | SYSTOLIC BLOOD PRESSURE: 142 MMHG

## 2019-03-18 DIAGNOSIS — Z12.11 SCREEN FOR COLON CANCER: ICD-10-CM

## 2019-03-18 DIAGNOSIS — E03.9 BORDERLINE HYPOTHYROIDISM: ICD-10-CM

## 2019-03-18 DIAGNOSIS — E03.9 ACQUIRED HYPOTHYROIDISM: ICD-10-CM

## 2019-03-18 DIAGNOSIS — Z12.5 SCREENING FOR PROSTATE CANCER: ICD-10-CM

## 2019-03-18 DIAGNOSIS — R91.1 PULMONARY NODULE SEEN ON IMAGING STUDY: ICD-10-CM

## 2019-03-18 DIAGNOSIS — R73.01 IMPAIRED FASTING GLUCOSE: ICD-10-CM

## 2019-03-18 DIAGNOSIS — I25.10 CORONARY ARTERY DISEASE, ANGINA PRESENCE UNSPECIFIED, UNSPECIFIED VESSEL OR LESION TYPE, UNSPECIFIED WHETHER NATIVE OR TRANSPLANTED HEART: ICD-10-CM

## 2019-03-18 DIAGNOSIS — E78.2 MIXED HYPERLIPIDEMIA: ICD-10-CM

## 2019-03-18 DIAGNOSIS — I10 ESSENTIAL HYPERTENSION: Primary | ICD-10-CM

## 2019-03-18 DIAGNOSIS — I10 BENIGN ESSENTIAL HYPERTENSION: ICD-10-CM

## 2019-03-18 PROCEDURE — 1160F RVW MEDS BY RX/DR IN RCRD: CPT | Performed by: INTERNAL MEDICINE

## 2019-03-18 PROCEDURE — 99214 OFFICE O/P EST MOD 30 MIN: CPT | Performed by: INTERNAL MEDICINE

## 2019-03-18 RX ORDER — AMLODIPINE BESYLATE 5 MG/1
5 TABLET ORAL DAILY
Qty: 90 TABLET | Refills: 3 | Status: SHIPPED | OUTPATIENT
Start: 2019-03-18 | End: 2020-01-15 | Stop reason: SDUPTHER

## 2019-03-18 RX ORDER — CLOPIDOGREL BISULFATE 75 MG/1
75 TABLET ORAL DAILY
Qty: 90 TABLET | Refills: 3 | Status: SHIPPED | OUTPATIENT
Start: 2019-03-18 | End: 2020-03-25 | Stop reason: SDUPTHER

## 2019-03-18 RX ORDER — ATORVASTATIN CALCIUM 40 MG/1
40 TABLET, FILM COATED ORAL DAILY
Qty: 90 TABLET | Refills: 3 | Status: SHIPPED | OUTPATIENT
Start: 2019-03-18 | End: 2020-01-15 | Stop reason: SDUPTHER

## 2019-03-18 RX ORDER — OLMESARTAN MEDOXOMIL AND HYDROCHLOROTHIAZIDE 40/25 40; 25 MG/1; MG/1
1 TABLET ORAL DAILY
Qty: 90 TABLET | Refills: 3 | Status: SHIPPED | OUTPATIENT
Start: 2019-03-18 | End: 2020-01-15 | Stop reason: SDUPTHER

## 2019-03-18 RX ORDER — FINASTERIDE 5 MG/1
5 TABLET, FILM COATED ORAL DAILY
Qty: 90 TABLET | Refills: 3 | Status: SHIPPED | OUTPATIENT
Start: 2019-03-18 | End: 2020-01-15 | Stop reason: SDUPTHER

## 2019-03-18 RX ORDER — CARVEDILOL 12.5 MG/1
12.5 TABLET ORAL 2 TIMES DAILY
Qty: 180 TABLET | Refills: 3 | Status: SHIPPED | OUTPATIENT
Start: 2019-03-18 | End: 2020-01-15 | Stop reason: SDUPTHER

## 2019-03-18 NOTE — PROGRESS NOTES
Assessment/Plan:    Impaired fasting glucose  Check fbs and a1c  No overt dm     Hyperlipidemia  ldl at goal  Ck and lfts wnl  Borderline hypothyroidism  Recheck tsh  Consider starting hormone if tsh is rising  Screening for prostate cancer  psa ordered    Screen for colon cancer  Refused colonosocopy but agreed to cologuard       Diagnoses and all orders for this visit:    Essential hypertension  -     Comprehensive metabolic panel; Future  -     olmesartan-hydrochlorothiazide (BENICAR HCT) 40-25 MG per tablet; Take 1 tablet by mouth daily    Mixed hyperlipidemia  -     Comprehensive metabolic panel; Future  -     CK; Future  -     Lipid Panel with Direct LDL reflex; Future  -     atorvastatin (LIPITOR) 40 mg tablet; Take 1 tablet (40 mg total) by mouth daily    Impaired fasting glucose  -     Comprehensive metabolic panel; Future  -     Hemoglobin A1C; Future    Acquired hypothyroidism  -     TSH, 3rd generation; Future    Benign essential hypertension  -     amLODIPine (NORVASC) 5 mg tablet; Take 1 tablet (5 mg total) by mouth daily  -     carvedilol (COREG) 12 5 mg tablet; Take 1 tablet (12 5 mg total) by mouth 2 (two) times a day    Borderline hypothyroidism    Coronary artery disease, angina presence unspecified, unspecified vessel or lesion type, unspecified whether native or transplanted heart  -     clopidogrel (PLAVIX) 75 mg tablet; Take 1 tablet (75 mg total) by mouth daily    Screen for colon cancer  -     finasteride (PROSCAR) 5 mg tablet; Take 1 tablet (5 mg total) by mouth daily  -     Cologuard; Future    Screening for prostate cancer  -     PSA, Total Screen; Future    Pulmonary nodule seen on imaging study  -     CT chest wo contrast; Future          Subjective:      Patient ID: Nikky Deshpande is a 74 y o  male  Patient presents in follow up for his medical problems        The following portions of the patient's history were reviewed and updated as appropriate: allergies, current medications, past family history, past medical history, past social history, past surgical history and problem list     Review of Systems   Constitutional: Negative for activity change, appetite change, chills, diaphoresis, fatigue, fever and unexpected weight change  HENT: Negative for congestion, dental problem, drooling, ear discharge, ear pain, facial swelling, hearing loss, mouth sores, nosebleeds, postnasal drip, rhinorrhea, sinus pressure, sinus pain, sneezing, sore throat, tinnitus, trouble swallowing and voice change  Eyes: Negative for photophobia, pain, discharge, redness, itching and visual disturbance  Respiratory: Negative for apnea, cough, choking, chest tightness, shortness of breath, wheezing and stridor  Cardiovascular: Negative for chest pain, palpitations and leg swelling  Gastrointestinal: Negative for abdominal distention, abdominal pain, anal bleeding, blood in stool, constipation, diarrhea, nausea, rectal pain and vomiting  Endocrine: Negative for cold intolerance, heat intolerance, polydipsia, polyphagia and polyuria  Genitourinary: Negative for decreased urine volume, difficulty urinating, dysuria, enuresis, flank pain, frequency, genital sores, hematuria and urgency  Musculoskeletal: Negative for arthralgias, back pain, gait problem, joint swelling, myalgias, neck pain and neck stiffness  Skin: Negative for color change, pallor, rash and wound  Allergic/Immunologic: Negative for environmental allergies, food allergies and immunocompromised state  Neurological: Negative for dizziness, tremors, seizures, syncope, facial asymmetry, speech difficulty, weakness, light-headedness, numbness and headaches  Hematological: Negative for adenopathy  Does not bruise/bleed easily  Psychiatric/Behavioral: Negative for agitation, self-injury, sleep disturbance and suicidal ideas  The patient is not nervous/anxious            Objective:      /90   Pulse 65   Ht 6' (1 829 m)   Wt 107 kg (235 lb)   SpO2 98%   BMI 31 87 kg/m²          Physical Exam   Constitutional: He is oriented to person, place, and time  He appears well-developed and well-nourished  No distress  HENT:   Head: Normocephalic and atraumatic  Right Ear: External ear normal    Left Ear: External ear normal    Mouth/Throat: Oropharynx is clear and moist    Eyes: Pupils are equal, round, and reactive to light  Conjunctivae and EOM are normal  No scleral icterus  Neck: Normal range of motion  Neck supple  No JVD present  No tracheal deviation present  No thyromegaly present  Cardiovascular: Normal rate, regular rhythm and intact distal pulses  Exam reveals no gallop and no friction rub  No murmur heard  Pulmonary/Chest: Effort normal and breath sounds normal  No respiratory distress  He has no wheezes  He has no rales  He exhibits no tenderness  Abdominal: Soft  Bowel sounds are normal  He exhibits no distension and no mass  There is no tenderness  There is no rebound and no guarding  Musculoskeletal: Normal range of motion  He exhibits tenderness  He exhibits no edema or deformity  Lymphadenopathy:     He has no cervical adenopathy  Neurological: He is alert and oriented to person, place, and time  He has normal reflexes  No cranial nerve deficit  Coordination normal    Skin: Skin is warm and dry  No rash noted  He is not diaphoretic  No erythema  No pallor  Psychiatric: He has a normal mood and affect   His behavior is normal  Judgment and thought content normal

## 2019-03-25 ENCOUNTER — HOSPITAL ENCOUNTER (OUTPATIENT)
Dept: CT IMAGING | Facility: CLINIC | Age: 74
Discharge: HOME | End: 2019-03-25
Payer: COMMERCIAL

## 2019-03-25 DIAGNOSIS — R91.1 PULMONARY NODULE SEEN ON IMAGING STUDY: ICD-10-CM

## 2019-03-25 PROCEDURE — 71250 CT THORAX DX C-: CPT

## 2019-03-30 DIAGNOSIS — I10 ESSENTIAL HYPERTENSION: ICD-10-CM

## 2019-04-01 RX ORDER — LOSARTAN POTASSIUM AND HYDROCHLOROTHIAZIDE 25; 100 MG/1; MG/1
TABLET ORAL
Qty: 30 TABLET | Refills: 0 | OUTPATIENT
Start: 2019-04-01

## 2019-04-02 DIAGNOSIS — I10 BENIGN ESSENTIAL HYPERTENSION: ICD-10-CM

## 2019-04-03 LAB
ALBUMIN SERPL-MCNC: 4.3 G/DL (ref 3.5–4.8)
ALBUMIN/GLOB SERPL: 1.4 {RATIO} (ref 1.2–2.2)
ALP SERPL-CCNC: 118 IU/L (ref 39–117)
ALT SERPL-CCNC: 21 IU/L (ref 0–44)
AST SERPL-CCNC: 22 IU/L (ref 0–40)
BILIRUB SERPL-MCNC: 1.3 MG/DL (ref 0–1.2)
BUN SERPL-MCNC: 26 MG/DL (ref 8–27)
BUN/CREAT SERPL: 23 (ref 10–24)
CALCIUM SERPL-MCNC: 9.2 MG/DL (ref 8.6–10.2)
CHLORIDE SERPL-SCNC: 101 MMOL/L (ref 96–106)
CHOLEST SERPL-MCNC: 125 MG/DL (ref 100–199)
CK SERPL-CCNC: 92 U/L (ref 24–204)
CO2 SERPL-SCNC: 25 MMOL/L (ref 20–29)
CREAT SERPL-MCNC: 1.14 MG/DL (ref 0.76–1.27)
GLOBULIN SER-MCNC: 3.1 G/DL (ref 1.5–4.5)
GLUCOSE SERPL-MCNC: 106 MG/DL (ref 65–99)
HBA1C MFR BLD: 5 % (ref 4.8–5.6)
HDLC SERPL-MCNC: 34 MG/DL
LDLC SERPL CALC-MCNC: 67 MG/DL (ref 0–99)
POTASSIUM SERPL-SCNC: 3.6 MMOL/L (ref 3.5–5.2)
PROT SERPL-MCNC: 7.4 G/DL (ref 6–8.5)
PSA SERPL-MCNC: 0.2 NG/ML (ref 0–4)
SL AMB EGFR AFRICAN AMERICAN: 72 ML/MIN/1.73
SL AMB EGFR NON AFRICAN AMERICAN: 63 ML/MIN/1.73
SODIUM SERPL-SCNC: 141 MMOL/L (ref 134–144)
TRIGL SERPL-MCNC: 119 MG/DL (ref 0–149)
TSH SERPL DL<=0.005 MIU/L-ACNC: 4.15 UIU/ML (ref 0.45–4.5)

## 2019-04-03 RX ORDER — AMLODIPINE BESYLATE 5 MG/1
TABLET ORAL
Qty: 90 TABLET | Refills: 1 | OUTPATIENT
Start: 2019-04-03

## 2019-04-20 DIAGNOSIS — E78.2 MIXED HYPERLIPIDEMIA: ICD-10-CM

## 2019-04-20 DIAGNOSIS — Z12.11 SCREEN FOR COLON CANCER: ICD-10-CM

## 2019-04-20 DIAGNOSIS — I10 BENIGN ESSENTIAL HYPERTENSION: ICD-10-CM

## 2019-04-23 RX ORDER — ATORVASTATIN CALCIUM 40 MG/1
TABLET, FILM COATED ORAL
Qty: 90 TABLET | Refills: 1 | Status: SHIPPED | OUTPATIENT
Start: 2019-04-23 | End: 2019-08-09 | Stop reason: CLARIF

## 2019-04-23 RX ORDER — FINASTERIDE 5 MG/1
TABLET, FILM COATED ORAL
Qty: 90 TABLET | Refills: 1 | Status: SHIPPED | OUTPATIENT
Start: 2019-04-23 | End: 2019-08-09 | Stop reason: CLARIF

## 2019-04-23 RX ORDER — CARVEDILOL 12.5 MG/1
12.5 TABLET ORAL 2 TIMES DAILY
Qty: 180 TABLET | Refills: 1 | Status: SHIPPED | OUTPATIENT
Start: 2019-04-23 | End: 2019-08-09 | Stop reason: CLARIF

## 2019-07-03 ENCOUNTER — TELEPHONE (OUTPATIENT)
Dept: INTERNAL MEDICINE CLINIC | Facility: CLINIC | Age: 74
End: 2019-07-03

## 2019-07-15 LAB — COLOGUARD (HISTORICAL): NEGATIVE

## 2019-08-09 ENCOUNTER — OFFICE VISIT (OUTPATIENT)
Dept: INTERNAL MEDICINE CLINIC | Facility: CLINIC | Age: 74
End: 2019-08-09
Payer: COMMERCIAL

## 2019-08-09 ENCOUNTER — TELEPHONE (OUTPATIENT)
Dept: INTERNAL MEDICINE CLINIC | Facility: CLINIC | Age: 74
End: 2019-08-09

## 2019-08-09 VITALS
SYSTOLIC BLOOD PRESSURE: 122 MMHG | OXYGEN SATURATION: 94 % | DIASTOLIC BLOOD PRESSURE: 78 MMHG | WEIGHT: 236.6 LBS | BODY MASS INDEX: 32.09 KG/M2 | HEART RATE: 58 BPM

## 2019-08-09 DIAGNOSIS — I35.0 MILD AORTIC STENOSIS: Chronic | ICD-10-CM

## 2019-08-09 DIAGNOSIS — I10 BENIGN ESSENTIAL HYPERTENSION: Chronic | ICD-10-CM

## 2019-08-09 DIAGNOSIS — I25.10 ATHEROSCLEROSIS OF NATIVE CORONARY ARTERY OF NATIVE HEART WITHOUT ANGINA PECTORIS: Chronic | ICD-10-CM

## 2019-08-09 DIAGNOSIS — E78.2 MIXED HYPERLIPIDEMIA: Chronic | ICD-10-CM

## 2019-08-09 DIAGNOSIS — Z23 ENCOUNTER FOR IMMUNIZATION: ICD-10-CM

## 2019-08-09 DIAGNOSIS — Z86.73 HISTORY OF STROKE: Chronic | ICD-10-CM

## 2019-08-09 DIAGNOSIS — Z00.00 MEDICARE ANNUAL WELLNESS VISIT, SUBSEQUENT: Primary | ICD-10-CM

## 2019-08-09 PROBLEM — E03.9 BORDERLINE HYPOTHYROIDISM: Status: RESOLVED | Noted: 2019-03-18 | Resolved: 2019-08-09

## 2019-08-09 PROBLEM — Z12.11 SCREEN FOR COLON CANCER: Status: RESOLVED | Noted: 2018-03-05 | Resolved: 2019-08-09

## 2019-08-09 PROBLEM — Z12.5 SCREENING FOR PROSTATE CANCER: Status: RESOLVED | Noted: 2018-03-05 | Resolved: 2019-08-09

## 2019-08-09 PROCEDURE — 1160F RVW MEDS BY RX/DR IN RCRD: CPT | Performed by: INTERNAL MEDICINE

## 2019-08-09 PROCEDURE — 99214 OFFICE O/P EST MOD 30 MIN: CPT | Performed by: INTERNAL MEDICINE

## 2019-08-09 PROCEDURE — G0439 PPPS, SUBSEQ VISIT: HCPCS | Performed by: INTERNAL MEDICINE

## 2019-08-09 PROCEDURE — 3725F SCREEN DEPRESSION PERFORMED: CPT | Performed by: INTERNAL MEDICINE

## 2019-08-09 PROCEDURE — 4040F PNEUMOC VAC/ADMIN/RCVD: CPT | Performed by: INTERNAL MEDICINE

## 2019-08-09 PROCEDURE — G0009 ADMIN PNEUMOCOCCAL VACCINE: HCPCS | Performed by: INTERNAL MEDICINE

## 2019-08-09 PROCEDURE — 1036F TOBACCO NON-USER: CPT | Performed by: INTERNAL MEDICINE

## 2019-08-09 PROCEDURE — 1170F FXNL STATUS ASSESSED: CPT | Performed by: INTERNAL MEDICINE

## 2019-08-09 PROCEDURE — 90670 PCV13 VACCINE IM: CPT | Performed by: INTERNAL MEDICINE

## 2019-08-09 PROCEDURE — 1125F AMNT PAIN NOTED PAIN PRSNT: CPT | Performed by: INTERNAL MEDICINE

## 2019-08-09 PROCEDURE — 3074F SYST BP LT 130 MM HG: CPT | Performed by: INTERNAL MEDICINE

## 2019-08-09 NOTE — PROGRESS NOTES
INTERNAL MEDICINE FOLLOW-UP OFFICE VISIT  [facility]    NAME: Josefina Winston  AGE: 74 y o  SEX: male  : 1945     DATE: 2019     Assessment and Plan:     1  Atherosclerosis of native coronary artery of native heart without angina pectoris    S/p CABG in past  Hasn't seen his cardiologist in over 2 years  Currently stable without angina  LDL within goal  Continue current medical management  - Ambulatory referral to Cardiology; Future  - CBC; Future    2  Benign essential hypertension    BP well controlled on current anti-hypertensives  - Ambulatory referral to Cardiology; Future  - Basic metabolic panel; Future  - CBC; Future    3  Mild aortic stenosis    Previous ECHO reviewed  Follow-up with cardiology  - Ambulatory referral to Cardiology; Future  - CBC; Future    4  Mixed hyperlipidemia    Continue statin as prescribed  LDL is <70 which is at goal     - Lipid panel; Future  - CBC; Future    5  History of stroke    Continue secondary stroke prevention  Return in about 5 months (around 2020) for Follow-up  Chief Complaint:     Chief Complaint   Patient presents with    Medicare Wellness Visit      History of Present Illness:     Patient presents to establish care with myself  Has history of stroke, CAD, HTN, HLD, BPH, gait disturbance, mild aortic stenosis, lung nodule, insomnia  Recent labs show stability  No significant renal dysfunction and his most recent GFR was 63  LDL is below 70 and A1C was 5 0%  His PSA was low at 0 2  He takes finasteride for underlying BPH  His TSH was normal and he is not currently prescribed thyroid replacement medication  He denies any recent hospitalizations  No CP, SOB, palpitations  He has chronic problems with sleep  Weak on left side from stroke  Uses cane at times for ambulation      The following portions of the patient's history were reviewed and updated as appropriate: allergies, current medications, past family history, past medical history, past social history, past surgical history and problem list      Review of Systems:     Review of Systems   Constitutional: Negative for activity change, appetite change and fatigue  Respiratory: Negative for apnea, cough, chest tightness, shortness of breath and wheezing  Cardiovascular: Negative for chest pain, palpitations and leg swelling  Gastrointestinal: Negative for abdominal distention, abdominal pain, blood in stool, constipation, diarrhea, nausea and vomiting  Musculoskeletal: Positive for arthralgias and gait problem  Negative for back pain, joint swelling and myalgias  Skin: Negative for rash and wound  Neurological: Negative for dizziness, weakness, light-headedness, numbness and headaches  Psychiatric/Behavioral: Positive for sleep disturbance  Negative for behavioral problems, confusion, hallucinations and suicidal ideas  The patient is not nervous/anxious  Problem List:     Patient Active Problem List   Diagnosis    Allergic rhinitis    Mild aortic stenosis    Atherosclerotic heart disease of native coronary artery without angina pectoris    Benign essential hypertension    BPH with obstruction/lower urinary tract symptoms    Gait instability    Hyperlipidemia    Insomnia    Male erectile dysfunction    Pulmonary nodule seen on imaging study    History of stroke      Objective:     /78 (BP Location: Left arm, Patient Position: Sitting, Cuff Size: Standard)   Pulse 58   Wt 107 kg (236 lb 9 6 oz)   SpO2 94%   BMI 32 09 kg/m²     Physical Exam   Constitutional: He is oriented to person, place, and time  He appears well-developed and well-nourished  No distress  Obesity   Eyes: Conjunctivae are normal  Right eye exhibits no discharge  Left eye exhibits no discharge  No scleral icterus  Neck: Neck supple  No JVD present  No thyromegaly present  Cardiovascular: Normal rate and regular rhythm     Murmur heard   Pulmonary/Chest: Effort normal and breath sounds normal  No respiratory distress  He has no wheezes  He has no rales  He exhibits no tenderness  Abdominal: Soft  Bowel sounds are normal  He exhibits no distension  There is no tenderness  Musculoskeletal: He exhibits no edema  Lymphadenopathy:     He has no cervical adenopathy  Neurological: He is alert and oriented to person, place, and time  Skin: Skin is warm and dry  He is not diaphoretic  Psychiatric: He has a normal mood and affect  His behavior is normal    Vitals reviewed      Chaney Goodpasture, DO  [facility]

## 2019-08-09 NOTE — TELEPHONE ENCOUNTER
CALLED AND HIS LABELS WERE IN ELIGIBLE AND THEY NEEDED CORRECT SPELLING OF LAST NAME WHICH WAS GIVEN TO THEM AND STATED IT CAN TAKE UP TO 14 BUSINESS DAYS TO RECEIVE RESULTS

## 2019-08-09 NOTE — PROGRESS NOTES
Assessment and Plan:     1  Medicare annual wellness visit, subsequent    2  Encounter for immunization  - PNEUMOCOCCAL CONJUGATE VACCINE 13-VALENT GREATER THAN 6 MONTHS    BMI Counseling: Body mass index is 32 09 kg/m²  Discussed the patient's BMI with him  The BMI is above average  BMI counseling and education was provided to the patient  Nutrition recommendations include reducing portion sizes, decreasing overall calorie intake, 3-5 servings of fruits/vegetables daily, moderation in carbohydrate intake and increasing intake of lean protein  Exercise recommendations include exercising 3-5 times per week       History of Present Illness:     Patient presents for Medicare Annual Wellness visit    Patient Care Team:  Dinesh Law DO as PCP - General (Internal Medicine)  Agnes Kaiser MD (Cardiology)  Elissa Noonan MD (Neurology)     Problem List:     Patient Active Problem List   Diagnosis    Allergic rhinitis    Mild aortic stenosis    Atherosclerotic heart disease of native coronary artery without angina pectoris    Benign essential hypertension    BPH with obstruction/lower urinary tract symptoms    Gait instability    Hyperlipidemia    Insomnia    Male erectile dysfunction    Pulmonary nodule seen on imaging study    History of stroke      Past Medical and Surgical History:     Past Medical History:   Diagnosis Date    Arthritis     Cardiac disorder     Hypertension     Stroke (cerebrum) ([facility] [address] )      Past Surgical History:   Procedure Laterality Date    CORONARY ARTERY BYPASS GRAFT      onset 2004 - x2 LIMA-LAD, SVG-RCA    TONSILLECTOMY        Family History:     Family History   Problem Relation Age of Onset    Coronary artery disease Mother     Arthritis Mother     Hypertension Mother     Stroke Mother     Other Father         aortic valve replacement; CABG    Arthritis Father     Hypertension Brother     Stroke Brother       Social History:     Social History     Tobacco Use Smoking Status Former Smoker    Packs/day: 1 50    Years: 4 00    Pack years: 6 00    Types: Cigarettes    Last attempt to quit: 1960    Years since quittin 6   Smokeless Tobacco Never Used   Tobacco Comment    former light tobacco smoker     Social History     Substance and Sexual Activity   Alcohol Use No     Social History     Substance and Sexual Activity   Drug Use No      Medications and Allergies:     Current Outpatient Medications   Medication Sig Dispense Refill    amLODIPine (NORVASC) 5 mg tablet Take 1 tablet (5 mg total) by mouth daily 90 tablet 3    aspirin (ASPIRIN LOW DOSE) 81 mg EC tablet Take 1 tablet by mouth daily      atorvastatin (LIPITOR) 40 mg tablet Take 1 tablet (40 mg total) by mouth daily 90 tablet 3    carvedilol (COREG) 12 5 mg tablet Take 1 tablet (12 5 mg total) by mouth 2 (two) times a day 180 tablet 3    clopidogrel (PLAVIX) 75 mg tablet Take 1 tablet (75 mg total) by mouth daily 90 tablet 3    finasteride (PROSCAR) 5 mg tablet Take 1 tablet (5 mg total) by mouth daily 90 tablet 3    mirtazapine (REMERON) 15 mg tablet Take 1 tablet (15 mg total) by mouth daily 90 tablet 1    olmesartan-hydrochlorothiazide (BENICAR HCT) 40-25 MG per tablet Take 1 tablet by mouth daily 90 tablet 3    Omega-3 Fatty Acids (FISH OIL) 1200 MG CPDR Take 1 capsule by mouth daily      Cholecalciferol (VITAMIN D3) 5000 units CAPS Take by mouth      fluticasone (FLONASE) 50 mcg/act nasal spray USE 2 SPRAYS IN EACH NOSTRIL ONCE DAILY (Patient not taking: Reported on 2019) 1 Bottle 5     No current facility-administered medications for this visit        No Known Allergies   Immunizations:     Immunization History   Administered Date(s) Administered    INFLUENZA 2014, 10/04/2017, 10/08/2018    Influenza TIV (IM) 2014, 2016    Pneumococcal Polysaccharide PPV23 10/02/2014    Tdap 1945, 2016    Zoster 1945      Medicare Screening Tests and Risk Assessments:     Salvatore Rios is here for his Subsequent Wellness visit  Health Risk Assessment:  Patient rates overall health as fair  Patient feels that their physical health rating is Same  Eyesight was rated as Same  Hearing was rated as Same  Patient feels that their emotional and mental health rating is Same  Pain experienced by patient in the last 7 days has been None  Patient states that he has experienced no weight loss or gain in last 6 months  Emotional/Mental Health:  Patient has not been feeling nervous/anxious  PHQ-9 Depression Screening:    Frequency of the following problems over the past two weeks:      1  Little interest or pleasure in doing things: 0 - not at all      2  Feeling down, depressed, or hopeless: 0 - not at all      3  Trouble falling or staying asleep, or sleeping too much: 0 - not at all      4  Feeling tired or having little energy: 0 - not at all      5  Poor appetite or overeatin - not at all      6  Feeling bad about yourself - or that you are a failure or have let yourself or your family down: 0 - not at all      7  Trouble concentrating on things, such as reading the newspaper or watching television: 0 - not at all      8  Moving or speaking so slowly that other people could have noticed  Or the opposite - being so fidgety or restless that you have been moving around a lot more than usual: 0 - not at all      9  Thoughts that you would be better off dead, or of hurting yourself in some way: 0 - not at all  PHQ-2 Score: 0  PHQ-9 Score: 0    Broken Bones/Falls: Fall Risk Assessment:    In the past year, patient has experienced: History of falling in past year    Number of falls: 1    Injured during fall: No      Patient feels unsteady when standing or walking     Patient is worried about falling     Bladder/Bowel:  Patient has leaked urine accidently in the last six months  Patient reports loss of bowel control        Immunizations:  Patient has had a flu vaccination within the last year  Patient has received a pneumonia shot  Patient has not received a shingles shot  Patient has not received tetanus/diphtheria shot  Home Safety:  Patient does not have trouble with stairs inside or outside of their home  Patient currently reports that there are safety hazards present in home , working smoke alarms, no working carbon monoxide detectors  Preventative Screenings:   prostate cancer screen performed, colon cancer screen completed, cholesterol screen completed, no glaucoma eye exam completed    Nutrition:  Current diet: Regular, No Added Salt and Limited junk food with servings of the following:    Medications:  Patient is currently taking over-the-counter supplements  Patient is able to manage medications  Lifestyle Choices:  Patient reports no tobacco use  Patient has smoked or used tobacco in the past   Patient has stopped his tobacco use  Tobacco use quit date: 07/01/1965  Patient reports no alcohol use  Patient drives a vehicle  Patient wears seat belt  Current level of exercise of physical activity described by patient as: walking-daily for 15 mins  Activities of Daily Living:  Can get out of bed by his or her self, able to dress self, able to make own meals, able to do own shopping, able to bathe self, can do own laundry/housekeeping, can manage own money, pay bills and track expenses    Previous Hospitalizations:  No hospitalization or ED visit in past 12 months        Advanced Directives:  Patient has not decided on power of   Patient has not spoken to designated power of   Patient has not completed advanced directive          Preventative Screening/Counseling:      Cardiovascular:      General: Risks and Benefits Discussed and Screening Not Indicated      Counseling: Healthy Diet and Healthy Weight          Diabetes:      General: Risks and Benefits Discussed and Screening Current      Counseling: Healthy Diet, Healthy Weight and Improve Physical Activity          Colorectal Cancer:      General: Risks and Benefits Discussed          Prostate Cancer:      General: Risks and Benefits Discussed and Screening Current          Osteoporosis:      General: Screening Not Indicated          AAA:      General: Screening Not Indicated          Glaucoma:      General: Risks and Benefits Discussed and Screening Current          HIV:      General: Screening Not Indicated          Hepatitis C:      General: Screening Not Indicated        Advanced Directives:   Patient has no living will for healthcare, does not have durable POA for healthcare, patient does not have an advanced directive  5 wishes given       Immunizations:      Influenza: Risks & Benefits Discussed and Influenza Recommended Annually      Pneumococcal: Risks & Benefits Discussed and Pneumococcal Due Today      Shingrix: Risks & Benefits Discussed        Jakob Doss DO

## 2019-08-09 NOTE — PATIENT INSTRUCTIONS
Obesity   AMBULATORY CARE:   Obesity  is when your body mass index (BMI) is greater than 30  Your healthcare provider will use your height and weight to measure your BMI  The risks of obesity include  many health problems, such as injuries or physical disability  You may need tests to check for the following:  · Diabetes     · High blood pressure or high cholesterol     · Heart disease     · Gallbladder or liver disease     · Cancer of the colon, breast, prostate, liver, or kidney     · Sleep apnea     · Arthritis or gout  Seek care immediately if:   · You have a severe headache, confusion, or difficulty speaking  · You have weakness on one side of your body  · You have chest pain, sweating, or shortness of breath  Contact your healthcare provider if:   · You have symptoms of gallbladder or liver disease, such as pain in your upper abdomen  · You have knee or hip pain and discomfort while walking  · You have symptoms of diabetes, such as intense hunger and thirst, and frequent urination  · You have symptoms of sleep apnea, such as snoring or daytime sleepiness  · You have questions or concerns about your condition or care  Treatment for obesity  focuses on helping you lose weight to improve your health  Even a small decrease in BMI can reduce the risk for many health problems  Your healthcare provider will help you set a weight-loss goal   · Lifestyle changes  are the first step in treating obesity  These include making healthy food choices and getting regular physical activity  Your healthcare provider may suggest a weight-loss program that involves coaching, education, and therapy  · Medicine  may help you lose weight when it is used with a healthy diet and physical activity  · Surgery  can help you lose weight if you are very obese and have other health problems  There are several types of weight-loss surgery  Ask your healthcare provider for more information    Be successful losing weight:   · Set small, realistic goals  An example of a small goal is to walk for 20 minutes 5 days a week  Anther goal is to lose 5% of your body weight  · Tell friends, family members, and coworkers about your goals  and ask for their support  Ask a friend to lose weight with you, or join a weight-loss support group  · Identify foods or triggers that may cause you to overeat , and find ways to avoid them  Remove tempting high-calorie foods from your home and workplace  Place a bowl of fresh fruit on your kitchen counter  If stress causes you to eat, then find other ways to cope with stress  · Keep a diary to track what you eat and drink  Also write down how many minutes of physical activity you do each day  Weigh yourself once a week and record it in your diary  Eating changes: You will need to eat 500 to 1,000 fewer calories each day than you currently eat to lose 1 to 2 pounds a week  The following changes will help you cut calories:  · Eat smaller portions  Use small plates, no larger than 9 inches in diameter  Fill your plate half full of fruits and vegetables  Measure your food using measuring cups until you know what a serving size looks like  · Eat 3 meals and 1 or 2 snacks each day  Plan your meals in advance  Cotto Comber and eat at home most of the time  Eat slowly  · Eat fruits and vegetables at every meal   They are low in calories and high in fiber, which makes you feel full  Do not add butter, margarine, or cream sauce to vegetables  Use herbs to season steamed vegetables  · Eat less fat and fewer fried foods  Eat more baked or grilled chicken and fish  These protein sources are lower in calories and fat than red meat  Limit fast food  Dress your salads with olive oil and vinegar instead of bottled dressing  · Limit the amount of sugar you eat  Do not drink sugary beverages  Limit alcohol  Activity changes:  Physical activity is good for your body in many ways   It helps you burn calories and build strong muscles  It decreases stress and depression, and improves your mood  It can also help you sleep better  Talk to your healthcare provider before you begin an exercise program   · Exercise for at least 30 minutes 5 days a week  Start slowly  Set aside time each day for physical activity that you enjoy and that is convenient for you  It is best to do both weight training and an activity that increases your heart rate, such as walking, bicycling, or swimming  · Find ways to be more active  Do yard work and housecleaning  Walk up the stairs instead of using elevators  Spend your leisure time going to events that require walking, such as outdoor festivals or fairs  This extra physical activity can help you lose weight and keep it off  Follow up with your healthcare provider as directed: You may need to meet with a dietitian  Write down your questions so you remember to ask them during your visits  © 2017 [employer] [employer] Information is for End User's use only and may not be sold, redistributed or otherwise used for commercial purposes  All illustrations and images included in CareNotes® are the copyrighted property of [employer]  or Dhaval Parker  The above information is an  only  It is not intended as medical advice for individual conditions or treatments  Talk to your doctor, nurse or pharmacist before following any medical regimen to see if it is safe and effective for you  Urinary Incontinence   WHAT YOU NEED TO KNOW:   What is urinary incontinence? Urinary incontinence (UI) is when you lose control of your bladder  What causes UI? UI occurs because your bladder cannot store or empty urine properly  The following are the most common types of UI:  · Stress incontinence  is when you leak urine due to increased bladder pressure  This may happen when you cough, sneeze, or exercise       · Urge incontinence  is when you feel the need to urinate right away and leak urine accidentally  · Mixed incontinence  is when you have both stress and urge UI  What are the signs and symptoms of UI?   · You feel like your bladder does not empty completely when you urinate  · You urinate often and need to urinate immediately  · You leak urine when you sleep, or you wake up with the urge to urinate  · You leak urine when you cough, sneeze, exercise, or laugh  How is UI diagnosed? Your healthcare provider will ask how often you leak urine and whether you have stress or urge symptoms  Tell him which medicines you take, how often you urinate, and how much liquid you drink each day  You may need any of the following tests:  · Urine tests  may show infection or kidney function  · A pelvic exam  may be done to check for blockages  A pelvic exam will also show if your bladder, uterus, or other organs have moved out of place  · An x-ray, ultrasound, or CT  may show problems with parts of your urinary system  You may be given contrast liquid to help your organs show up better in the pictures  Tell the healthcare provider if you have ever had an allergic reaction to contrast liquid  Do not enter the MRI room with anything metal  Metal can cause serious injury  Tell the healthcare provider if you have any metal in or on your body  · A bladder scan  will show how much urine is left in your bladder after you urinate  You will be asked to urinate and then healthcare providers will use a small ultrasound machine to check the urine left in your bladder  · Cystometry  is used to check the function of your urinary system  Your healthcare provider checks the pressure in your bladder while filling it with fluid  Your bladder pressure may also be tested when your bladder is full and while you urinate  How is UI treated? · Medicines  can help strengthen your bladder control      · Electrical stimulation  is used to send a small amount of electrical energy to your pelvic floor muscles  This helps control your bladder function  Electrodes may be placed outside your body or in your rectum  For women, the electrodes may be placed in the vagina  · A bulking agent  may be injected into the wall of your urethra to make it thicker  This helps keep your urethra closed and decreases urine leakage  · Devices  such as a clamp, pessary, or tampon may help stop urine leaks  Ask your healthcare provider for more information about these and other devices  · Surgery  may be needed if other treatments do not work  Several types of surgery can help improve your bladder control  Ask your healthcare provider for more information about the surgery you may need  How can I manage my symptoms? · Do pelvic muscle exercises often  Your pelvic muscles help you stop urinating  Squeeze these muscles tight for 5 seconds, then relax for 5 seconds  Gradually work up to squeezing for 10 seconds  Do 3 sets of 15 repetitions a day, or as directed  This will help strengthen your pelvic muscles and improve bladder control  · A catheter  may be used to help empty your bladder  A catheter is a tiny, plastic tube that is put into your bladder to drain your urine  Your healthcare provider may tell you to use a catheter to prevent your bladder from getting too full and leaking urine  · Keep a UI record  Write down how often you leak urine and how much you leak  Make a note of what you were doing when you leaked urine  · Train your bladder  Go to the bathroom at set times, such as every 2 hours, even if you do not feel the urge to go  You can also try to hold your urine when you feel the urge to go  For example, hold your urine for 5 minutes when you feel the urge to go  As that becomes easier, hold your urine for 10 minutes  · Drink liquids as directed  Ask your healthcare provider how much liquid to drink each day and which liquids are best for you   You may need to limit the amount of liquid you drink to help control your urine leakage  Limit or do not have drinks that contain caffeine or alcohol  Do not drink any liquid right before you go to bed  · Prevent constipation  Eat a variety of high-fiber foods  Good examples are high-fiber cereals, beans, vegetables, and whole-grain breads  Prune juice may help make your bowel movement softer  Walking is the best way to trigger your intestines to have a bowel movement  · Exercise regularly and maintain a healthy weight  Ask your healthcare provider how much you should weigh and about the best exercise plan for you  Weight loss and exercise will decrease pressure on your bladder and help you control your leakage  Ask him to help you create a weight loss plan if you are overweight  When should I seek immediate care? · You have severe pain  · You are confused or cannot think clearly  When should I contact my healthcare provider? · You have a fever  · You see blood in your urine  · You have pain when you urinate  · You have new or worse pain, even after treatment  · Your mouth feels dry or you have vision changes  · Your urine is cloudy or smells bad  · You have questions or concerns about your condition or care  CARE AGREEMENT:   You have the right to help plan your care  Learn about your health condition and how it may be treated  Discuss treatment options with your caregivers to decide what care you want to receive  You always have the right to refuse treatment  The above information is an  only  It is not intended as medical advice for individual conditions or treatments  Talk to your doctor, nurse or pharmacist before following any medical regimen to see if it is safe and effective for you  © 2017 [employer] Information is for End User's use only and may not be sold, redistributed or otherwise used for commercial purposes   All illustrations and images included in CareNotes® are the copyrighted property of [employer]  or Dhaval Parker  Cigarette Smoking and Your Health   AMBULATORY CARE:   Risks to your health if you smoke:  Nicotine and other chemicals found in tobacco damage every cell in your body  Even if you are a light smoker, you have an increased risk for cancer, heart disease, and lung disease  If you are pregnant or have diabetes, smoking increases your risk for complications  Benefits to your health if you stop smoking:   · You decrease respiratory symptoms such as coughing, wheezing, and shortness of breath  · You reduce your risk for cancers of the lung, mouth, throat, kidney, bladder, pancreas, stomach, and cervix  If you already have cancer, you increase the benefits of chemotherapy  You also reduce your risk for cancer returning or a second cancer from developing  · You reduce your risk for heart disease, blood clots, heart attack, and stroke  · You reduce your risk for lung infections, and diseases such as pneumonia, asthma, chronic bronchitis, and emphysema  · Your circulation improves  More oxygen can be delivered to your body  If you have diabetes, you lower your risk for complications, such as kidney, artery, and eye diseases  You also lower your risk for nerve damage  Nerve damage can lead to amputations, poor vision, and blindness  · You improve your body's ability to heal and to fight infections  Benefits to the health of others if you stop smoking:  Tobacco is harmful to nonsmokers who breathe in your secondhand smoke  The following are ways the health of others around you may improve when you stop smoking:  · You lower the risks for lung cancer and heart disease in nonsmoking adults  · If you are pregnant, you lower the risk for miscarriage, early delivery, low birth weight, and stillbirth  You also lower your baby's risk for SIDS, obesity, developmental delay, and neurobehavioral problems, such as ADHD  · If you have children, you lower their risk for ear infections, colds, pneumonia, bronchitis, and asthma  For more information and support to stop smoking:   · Smokefree  gov  Phone: 9- 593 - 652-4575  Web Address: www smokefrFPSI  Follow up with your healthcare provider as directed:  Write down your questions so you remember to ask them during your visits  © 2017 [address] [employer] Information is for End User's use only and may not be sold, redistributed or otherwise used for commercial purposes  All illustrations and images included in CareNotes® are the copyrighted property of [employer]  or Dhaval Parker  The above information is an  only  It is not intended as medical advice for individual conditions or treatments  Talk to your doctor, nurse or pharmacist before following any medical regimen to see if it is safe and effective for you  Fall Prevention   WHAT YOU NEED TO KNOW:   What is fall prevention? Fall prevention includes ways to make your home and other areas safer  It also includes ways you can move more carefully to prevent a fall  What increases my risk for falls? · Lack of vitamin D    · Not getting enough sleep each night    · Trouble walking or keeping your balance, or foot problems    · Health conditions that cause changes in your blood pressure, vision, or muscle strength and coordination    · Medicines that make you dizzy, weak, or sleepy    · Problems seeing clearly    · Shoes that have high heels or are not supportive    · Tripping hazards, such as items left on the floor, no handrails on the stairs, or broken steps  How can I help protect myself from falls? · Stand or sit up slowly  This may help you keep your balance and prevent falls  If you need to get up during the night, sit up first  Be sure you are fully awake before you stand  Turn on the light before you start walking  Go slowly in case you are still sleepy   Make sure you will not trip over any pets sleeping in the bedroom  · Use assistive devices as directed  Your healthcare provider may suggest that you use a cane or walker to help you keep your balance  You may need to have grab bars put in your bathroom near the toilet or in the shower  · Wear shoes that fit well and have soles that   Wear shoes both inside and outside  Use slippers with good   Do not wear shoes with high heels  · Wear a personal alarm  This is a device that allows you to call 911 if you fall and need help  Ask your healthcare provider for more information  · Stay active  Exercise can help strengthen your muscles and improve your balance  Your healthcare provider may recommend water aerobics or walking  He or she may also recommend physical therapy to improve your coordination  Never start an exercise program without talking to your healthcare provider first      · Manage medical conditions  Keep all appointments with your healthcare providers  Visit your eye doctor as directed  How can I make my home safer? · Add items to prevent falls in the bathroom  Put nonslip strips on your bath or shower floor to prevent you from slipping  Use a bath mat if you do not have carpet in the bathroom  This will prevent you from falling when you step out of the bath or shower  Use a shower seat so you do not need to stand while you shower  Sit on the toilet or a chair in your bathroom to dry yourself and put on clothing  This will prevent you from losing your balance from drying or dressing yourself while you are standing  · Keep paths clear  Remove books, shoes, and other objects from walkways and stairs  Place cords for telephones and lamps out of the way so that you do not need to walk over them  Tape them down if you cannot move them  Remove small rugs  If you cannot remove a rug, secure it with double-sided tape  This will prevent you from tripping  · Install bright lights in your home  Use night lights to help light paths to the bathroom or kitchen  Always turn on the light before you start walking  · Keep items you use often on shelves within reach  Do not use a step stool to help you reach an item  · Paint or place reflective tape on the edges of your stairs  This will help you see the stairs better  Call 911 or have someone else call if:   · You have fallen and are unconscious  · You have fallen and cannot move part of your body  Contact your healthcare provider if:   · You have fallen and have pain or a headache  · You have questions or concerns about your condition or care  CARE AGREEMENT:   You have the right to help plan your care  Learn about your health condition and how it may be treated  Discuss treatment options with your caregivers to decide what care you want to receive  You always have the right to refuse treatment  The above information is an  only  It is not intended as medical advice for individual conditions or treatments  Talk to your doctor, nurse or pharmacist before following any medical regimen to see if it is safe and effective for you  © 2017 [address] [facility] Information is for End User's use only and may not be sold, redistributed or otherwise used for commercial purposes  All illustrations and images included in CareNotes® are the copyrighted property of [employer]  or Dhaval Parker  Advance Directives   WHAT YOU NEED TO KNOW:   What are advance directives? Advance directives are legal documents that state your wishes and plans for medical care  These plans are made ahead of time in case you lose your ability to make decisions for yourself  Advance directives can apply to any medical decision, such as the treatments you want, and if you want to donate organs  What are the types of advance directives? There are many types of advance directives, and each state has rules about how to use them   You may choose a combination of any of the following:  · Living will: This is a written record of the treatment you want  You can also choose which treatments you do not want, which to limit, and which to stop at a certain time  This includes surgery, medicine, IV fluid, and tube feedings  · Durable power of  for healthcare [place] [facility]): This is a written record that states who you want to make healthcare choices for you when you are unable to make them for yourself  This person, called a proxy, is usually a family member or a friend  You may choose more than 1 proxy  · Do not resuscitate (DNR) order:  A DNR order is used in case your heart stops beating or you stop breathing  It is a request not to have certain forms of treatment, such as CPR  A DNR order may be included in other types of advance directives  · Medical directive: This covers the care that you want if you are in a coma, near death, or unable to make decisions for yourself  You can list the treatments you want for each condition  Treatment may include pain medicine, surgery, blood transfusions, dialysis, IV or tube feedings, and a ventilator (breathing machine)  · Values history: This document has questions about your views, beliefs, and how you feel and think about life  This information can help others choose the care that you would choose  Why are advance directives important? An advance directive helps you control your care  Although spoken wishes may be used, it is better to have your wishes written down  Spoken wishes can be misunderstood, or not followed  Treatments may be given even if you do not want them  An advance directive may make it easier for your family to make difficult choices about your care  How do I decide what to put in my advance directives? · Make informed decisions:  Make sure you fully understand treatments or care you may receive   Think about the benefits and problems your decisions could cause for you or your family  Talk to healthcare providers if you have concerns or questions before you write down your wishes  You may also want to talk with your Orthodox or , or a   Check your state laws to make sure that what you put in your advance directive is legal      · Sign all forms:  Sign and date your advance directive when you have finished  You may also need 2 witnesses to sign the forms  Witnesses cannot be your doctor or his staff, your spouse, heirs or beneficiaries, people you owe money to, or your chosen proxy  Talk to your family, proxy, and healthcare providers about your advance directive  Give each person a copy, and keep one for yourself in a place you can get to easily  Do not keep it hidden or locked away  · Review and revise your plans: You can revise your advance directive at any time, as long as you are able to make decisions  Review your plan every year, and when there are changes in your life, or your health  When you make changes, let your family, proxy, and healthcare providers know  Give each a new copy  Where can I find more information? · [employer]  [address] [place] , [address]  Phone: 6- 365 - [phone]  Phone: 6- 262 - 634-6920  Web Address: http://www  aafp org  · [address] [facility])  [address] [facility] [place], [address]  Phone: 1- 713 - [phone]  Phone: [phone]  Web Address: Piero bran  [facility] AGREEMENT:   You have the right to help plan your care  To help with this plan, you must learn about your health condition and treatment options  You must also learn about advance directives and how they are used  Work with your healthcare providers to decide what care will be used to treat you  You always have the right to refuse treatment  The above information is an  only   It is not intended as medical advice for individual conditions or treatments  Talk to your doctor, nurse or pharmacist before following any medical regimen to see if it is safe and effective for you  © 2017 [address] [employer] Information is for End User's use only and may not be sold, redistributed or otherwise used for commercial purposes  All illustrations and images included in CareNotes® are the copyrighted property of [employer] M [employer]  or Dhaval Parker

## 2019-08-09 NOTE — TELEPHONE ENCOUNTER
----- Message from Rosalia Alcaraz DO sent at 8/9/2019  1:55 PM EDT -----  Regarding: Hans  Can we call [employer] ([phone]) to find out if there was an issue processing his capricerd sample  Patient states he sent it in  I logged into provider portal and all it says is active-in process but it has been in this status for at least a month  Thanks

## 2019-08-19 ENCOUNTER — TELEPHONE (OUTPATIENT)
Dept: INTERNAL MEDICINE CLINIC | Facility: CLINIC | Age: 74
End: 2019-08-19

## 2019-08-19 NOTE — TELEPHONE ENCOUNTER
----- Message from Emilie Navarro DO sent at 8/19/2019  1:54 PM EDT -----  Call patient and let him know cologuard testing was negative

## 2019-09-17 ENCOUNTER — OFFICE VISIT (OUTPATIENT)
Dept: CARDIOLOGY CLINIC | Facility: CLINIC | Age: 74
End: 2019-09-17
Payer: COMMERCIAL

## 2019-09-17 VITALS
WEIGHT: 236.8 LBS | DIASTOLIC BLOOD PRESSURE: 72 MMHG | HEIGHT: 72 IN | OXYGEN SATURATION: 97 % | BODY MASS INDEX: 32.07 KG/M2 | HEART RATE: 61 BPM | SYSTOLIC BLOOD PRESSURE: 148 MMHG

## 2019-09-17 DIAGNOSIS — E78.2 MIXED HYPERLIPIDEMIA: Primary | Chronic | ICD-10-CM

## 2019-09-17 DIAGNOSIS — I35.0 NONRHEUMATIC AORTIC VALVE STENOSIS: ICD-10-CM

## 2019-09-17 DIAGNOSIS — I35.0 MILD AORTIC STENOSIS: Chronic | ICD-10-CM

## 2019-09-17 DIAGNOSIS — I10 BENIGN ESSENTIAL HYPERTENSION: Chronic | ICD-10-CM

## 2019-09-17 DIAGNOSIS — R06.02 SHORTNESS OF BREATH: ICD-10-CM

## 2019-09-17 DIAGNOSIS — I25.10 ATHEROSCLEROSIS OF NATIVE CORONARY ARTERY OF NATIVE HEART WITHOUT ANGINA PECTORIS: Chronic | ICD-10-CM

## 2019-09-17 PROCEDURE — 99214 OFFICE O/P EST MOD 30 MIN: CPT | Performed by: INTERNAL MEDICINE

## 2019-09-17 NOTE — PROGRESS NOTES
[facility] [address] [address]  Cardiology Follow Up    [facility]  1945  [id number]      1  Mixed hyperlipidemia     2  Atherosclerosis of native coronary artery of native heart without angina pectoris  Ambulatory referral to Cardiology   3  Benign essential hypertension  Ambulatory referral to Cardiology   4  Mild aortic stenosis  Ambulatory referral to Cardiology       Chief Complaint   Patient presents with    Follow-up       Interval History:   Patient presents for follow-up visit  Patient does have history of shortness of breath with exertion  Patient has multiple medical problems  Patient does have history of CAD status post CABG in the past   Patient is unable to exercise on the treadmill   Patient does have history of CVA  Patient also has history of aortic stenosis      Patient Active Problem List   Diagnosis    Allergic rhinitis    Mild aortic stenosis    Atherosclerotic heart disease of native coronary artery without angina pectoris    Benign essential hypertension    BPH with obstruction/lower urinary tract symptoms    Gait instability    Hyperlipidemia    Insomnia    Male erectile dysfunction    Pulmonary nodule seen on imaging study    History of stroke     Past Medical History:   Diagnosis Date    Arthritis     Cardiac disorder     Hypertension     Stroke (cerebrum) ([facility] [address] )      Social History     Socioeconomic History    Marital status: Single     Spouse name: Not on file    Number of children: Not on file    Years of education: Not on file    Highest education level: Not on file   Occupational History    Not on file   Social Needs    Financial resource strain: Not on file    Food insecurity:     Worry: Not on file     Inability: Not on file    Transportation needs:     Medical: Not on file     Non-medical: Not on file   Tobacco Use    Smoking status: Former Smoker     Packs/day: 1 50     Years: 4 00 Pack years: 6 00     Types: Cigarettes     Last attempt to quit:      Years since quittin 7    Smokeless tobacco: Never Used    Tobacco comment: former light tobacco smoker   Substance and Sexual Activity    Alcohol use: No    Drug use: No    Sexual activity: Never   Lifestyle    Physical activity:     Days per week: 7 days     Minutes per session: 20 min    Stress:  To some extent   Relationships    Social connections:     Talks on phone: Not on file     Gets together: Not on file     Attends Hoahaoism service: Not on file     Active member of club or organization: Not on file     Attends meetings of clubs or organizations: Not on file     Relationship status: Not on file    Intimate partner violence:     Fear of current or ex partner: Not on file     Emotionally abused: Not on file     Physically abused: Not on file     Forced sexual activity: Not on file   Other Topics Concern    Not on file   Social History Narrative    Caffeine use    No advance directives      Family History   Problem Relation Age of Onset    Coronary artery disease Mother     Arthritis Mother     Hypertension Mother     Stroke Mother     Other Father         aortic valve replacement; CABG    Arthritis Father     Hypertension Brother     Stroke Brother      Past Surgical History:   Procedure Laterality Date    CORONARY ARTERY BYPASS GRAFT  2004    x2 LIMA-LAD, SVG-RCA    TONSILLECTOMY         Current Outpatient Medications:     amLODIPine (NORVASC) 5 mg tablet, Take 1 tablet (5 mg total) by mouth daily, Disp: 90 tablet, Rfl: 3    aspirin (ASPIRIN LOW DOSE) 81 mg EC tablet, Take 1 tablet by mouth daily, Disp: , Rfl:     atorvastatin (LIPITOR) 40 mg tablet, Take 1 tablet (40 mg total) by mouth daily, Disp: 90 tablet, Rfl: 3    carvedilol (COREG) 12 5 mg tablet, Take 1 tablet (12 5 mg total) by mouth 2 (two) times a day, Disp: 180 tablet, Rfl: 3    Cholecalciferol (VITAMIN D3) 5000 units CAPS, Take by mouth, Disp: , Rfl:     clopidogrel (PLAVIX) 75 mg tablet, Take 1 tablet (75 mg total) by mouth daily, Disp: 90 tablet, Rfl: 3    finasteride (PROSCAR) 5 mg tablet, Take 1 tablet (5 mg total) by mouth daily, Disp: 90 tablet, Rfl: 3    mirtazapine (REMERON) 15 mg tablet, Take 1 tablet (15 mg total) by mouth daily, Disp: 90 tablet, Rfl: 1    olmesartan-hydrochlorothiazide (BENICAR HCT) 40-25 MG per tablet, Take 1 tablet by mouth daily, Disp: 90 tablet, Rfl: 3    Omega-3 Fatty Acids (FISH OIL) 1200 MG CPDR, Take 1 capsule by mouth daily, Disp: , Rfl:     fluticasone (FLONASE) 50 mcg/act nasal spray, USE 2 SPRAYS IN EACH NOSTRIL ONCE DAILY (Patient not taking: Reported on 8/9/2019), Disp: 1 Bottle, Rfl: 5  No Known Allergies    Labs:  No visits with results within 2 Month(s) from this visit  Latest known visit with results is:   Orders Only on 07/15/2019   Component Date Value    Cologuard 07/15/2019 Negative      Imaging: No results found  Review of Systems:  Review of Systems     REVIEW OF SYSTEMS:  Constitutional:  Denies fever or chills   Eyes:  Denies change in visual acuity   HENT:  Denies nasal congestion or sore throat   Respiratory:   shortness of breath   Cardiovascular:  Denies chest pain or edema   GI:  Denies abdominal pain, nausea, vomiting, bloody stools or diarrhea   :  Denies dysuria, frequency, difficulty in micturition and nocturia  Musculoskeletal:  Denies back pain or joint pain   Neurologic:   History of CVA  Endocrine:  Denies polyuria or polydipsia   Lymphatic:  Denies swollen glands   Psychiatric:  Denies depression or anxiety     Physical Exam:    /72   Pulse 61   Ht 6' (1 829 m)   Wt 107 kg (236 lb 12 8 oz)   SpO2 97%   BMI 32 12 kg/m²     Physical Exam    PHYSICAL EXAM:  General:  Patient is not in acute distress   Head: Normocephalic, Atraumatic  HEENT:  Both pupils normal-size atraumatic, normocephalic, nonicteric  Neck:  JVP not raised   Trachea central  No carotid bruit  Respiratory:  Decreased breath sounds bilaterally  Cardiovascular:  Regular rate and rhythm no S3   2/6 systolic ejection murmur in the right sternal border  GI:  Abdomen soft nontender  No organomegaly  Lymphatic:  No cervical or inguinal lymphadenopathy  Neurologic:  Patient is awake alert, oriented   Grossly nonfocal   extremities reveal trace edema    Discussion/Summary:  Patient with symptoms of shortness of breath physician more than usual   Patient does have significant risk factors for coronary artery disease including hypertension diabetes as well as hyperlipidemia  Patient is unable to exercise on the treadmill because of significant shortness of breath with minimal exertion as well as history of CVA  Patient will be scheduled for pharmacological nuclear stress test to assess for ischemia  Sensitivity and specificity of stress test also discussed with the patient  Echocardiogram to reassess history of aortic stenosis  Last echocardiogram was 3 years ago  Symptoms to watch out from cardiac standpoint which would indicate the need for further cardiac evaluation discussed  Dietary and risk factor modification to continue  Patient understands the risks and benefits of anti-platelet therapy  Follow-up in 6 months  Follow-up with primary care physician  Patient is agreeable with the plan of care

## 2019-09-25 ENCOUNTER — HOSPITAL ENCOUNTER (OUTPATIENT)
Dept: NON INVASIVE DIAGNOSTICS | Facility: CLINIC | Age: 74
Discharge: HOME | End: 2019-09-25
Payer: COMMERCIAL

## 2019-09-25 DIAGNOSIS — I35.0 NONRHEUMATIC AORTIC VALVE STENOSIS: ICD-10-CM

## 2019-09-25 DIAGNOSIS — R06.02 SHORTNESS OF BREATH: ICD-10-CM

## 2019-09-25 DIAGNOSIS — I25.10 ATHEROSCLEROSIS OF NATIVE CORONARY ARTERY OF NATIVE HEART WITHOUT ANGINA PECTORIS: Chronic | ICD-10-CM

## 2019-09-25 PROCEDURE — 78452 HT MUSCLE IMAGE SPECT MULT: CPT | Performed by: INTERNAL MEDICINE

## 2019-09-25 PROCEDURE — A9502 TC99M TETROFOSMIN: HCPCS

## 2019-09-25 PROCEDURE — 78452 HT MUSCLE IMAGE SPECT MULT: CPT

## 2019-09-25 PROCEDURE — 93018 CV STRESS TEST I&R ONLY: CPT | Performed by: INTERNAL MEDICINE

## 2019-09-25 PROCEDURE — 93017 CV STRESS TEST TRACING ONLY: CPT

## 2019-09-25 PROCEDURE — 93016 CV STRESS TEST SUPVJ ONLY: CPT | Performed by: INTERNAL MEDICINE

## 2019-09-25 PROCEDURE — 93306 TTE W/DOPPLER COMPLETE: CPT

## 2019-09-25 PROCEDURE — 93306 TTE W/DOPPLER COMPLETE: CPT | Performed by: INTERNAL MEDICINE

## 2019-09-25 RX ADMIN — REGADENOSON 0.4 MG: 0.08 INJECTION, SOLUTION INTRAVENOUS at 13:20

## 2019-09-26 LAB
ARRHY DURING EX: NORMAL
CHEST PAIN STATEMENT: NORMAL
MAX DIASTOLIC BP: 88 MMHG
MAX HEART RATE: 84 BPM
MAX PREDICTED HEART RATE: 144 BPM
MAX. SYSTOLIC BP: 158 MMHG
PROTOCOL NAME: NORMAL
REASON FOR TERMINATION: NORMAL
TARGET HR FORMULA: NORMAL
TEST INDICATION: NORMAL
TIME IN EXERCISE PHASE: NORMAL

## 2019-12-14 LAB
BASOPHILS # BLD AUTO: 0 X10E3/UL (ref 0–0.2)
BASOPHILS NFR BLD AUTO: 0 %
BUN SERPL-MCNC: 21 MG/DL (ref 8–27)
BUN/CREAT SERPL: 20 (ref 10–24)
CALCIUM SERPL-MCNC: 9.1 MG/DL (ref 8.6–10.2)
CHLORIDE SERPL-SCNC: 98 MMOL/L (ref 96–106)
CHOLEST SERPL-MCNC: 122 MG/DL (ref 100–199)
CO2 SERPL-SCNC: 25 MMOL/L (ref 20–29)
CREAT SERPL-MCNC: 1.06 MG/DL (ref 0.76–1.27)
EOSINOPHIL # BLD AUTO: 0.1 X10E3/UL (ref 0–0.4)
EOSINOPHIL NFR BLD AUTO: 2 %
ERYTHROCYTE [DISTWIDTH] IN BLOOD BY AUTOMATED COUNT: 12.9 % (ref 12.3–15.4)
GLUCOSE SERPL-MCNC: 119 MG/DL (ref 65–99)
HCT VFR BLD AUTO: 42.9 % (ref 37.5–51)
HDLC SERPL-MCNC: 36 MG/DL
HGB BLD-MCNC: 14.9 G/DL (ref 13–17.7)
IMM GRANULOCYTES # BLD: 0 X10E3/UL (ref 0–0.1)
IMM GRANULOCYTES NFR BLD: 0 %
LDLC SERPL CALC-MCNC: 62 MG/DL (ref 0–99)
LYMPHOCYTES # BLD AUTO: 1.7 X10E3/UL (ref 0.7–3.1)
LYMPHOCYTES NFR BLD AUTO: 23 %
MCH RBC QN AUTO: 32 PG (ref 26.6–33)
MCHC RBC AUTO-ENTMCNC: 34.7 G/DL (ref 31.5–35.7)
MCV RBC AUTO: 92 FL (ref 79–97)
MONOCYTES # BLD AUTO: 0.9 X10E3/UL (ref 0.1–0.9)
MONOCYTES NFR BLD AUTO: 12 %
NEUTROPHILS # BLD AUTO: 4.8 X10E3/UL (ref 1.4–7)
NEUTROPHILS NFR BLD AUTO: 63 %
PLATELET # BLD AUTO: 222 X10E3/UL (ref 150–450)
POTASSIUM SERPL-SCNC: 3.8 MMOL/L (ref 3.5–5.2)
RBC # BLD AUTO: 4.66 X10E6/UL (ref 4.14–5.8)
SL AMB EGFR AFRICAN AMERICAN: 78 ML/MIN/1.73
SL AMB EGFR NON AFRICAN AMERICAN: 68 ML/MIN/1.73
SL AMB VLDL CHOLESTEROL CALC: 24 MG/DL (ref 5–40)
SODIUM SERPL-SCNC: 138 MMOL/L (ref 134–144)
TRIGL SERPL-MCNC: 119 MG/DL (ref 0–149)
WBC # BLD AUTO: 7.5 X10E3/UL (ref 3.4–10.8)

## 2020-01-15 ENCOUNTER — OFFICE VISIT (OUTPATIENT)
Dept: INTERNAL MEDICINE CLINIC | Facility: CLINIC | Age: 75
End: 2020-01-15
Payer: COMMERCIAL

## 2020-01-15 VITALS
SYSTOLIC BLOOD PRESSURE: 110 MMHG | OXYGEN SATURATION: 98 % | BODY MASS INDEX: 31.79 KG/M2 | HEART RATE: 62 BPM | WEIGHT: 234.4 LBS | DIASTOLIC BLOOD PRESSURE: 60 MMHG

## 2020-01-15 DIAGNOSIS — N40.1 BPH WITH OBSTRUCTION/LOWER URINARY TRACT SYMPTOMS: Chronic | ICD-10-CM

## 2020-01-15 DIAGNOSIS — Z86.73 HISTORY OF STROKE: Chronic | ICD-10-CM

## 2020-01-15 DIAGNOSIS — I25.10 ATHEROSCLEROSIS OF NATIVE CORONARY ARTERY OF NATIVE HEART WITHOUT ANGINA PECTORIS: Chronic | ICD-10-CM

## 2020-01-15 DIAGNOSIS — R26.81 GAIT INSTABILITY: ICD-10-CM

## 2020-01-15 DIAGNOSIS — R73.01 IMPAIRED FASTING GLUCOSE: ICD-10-CM

## 2020-01-15 DIAGNOSIS — I10 BENIGN ESSENTIAL HYPERTENSION: Primary | ICD-10-CM

## 2020-01-15 DIAGNOSIS — E78.2 MIXED HYPERLIPIDEMIA: ICD-10-CM

## 2020-01-15 DIAGNOSIS — F51.02 ADJUSTMENT INSOMNIA: ICD-10-CM

## 2020-01-15 DIAGNOSIS — N13.8 BPH WITH OBSTRUCTION/LOWER URINARY TRACT SYMPTOMS: Chronic | ICD-10-CM

## 2020-01-15 PROCEDURE — 3725F SCREEN DEPRESSION PERFORMED: CPT | Performed by: INTERNAL MEDICINE

## 2020-01-15 PROCEDURE — 3078F DIAST BP <80 MM HG: CPT | Performed by: INTERNAL MEDICINE

## 2020-01-15 PROCEDURE — 99214 OFFICE O/P EST MOD 30 MIN: CPT | Performed by: INTERNAL MEDICINE

## 2020-01-15 PROCEDURE — 1036F TOBACCO NON-USER: CPT | Performed by: INTERNAL MEDICINE

## 2020-01-15 PROCEDURE — 3074F SYST BP LT 130 MM HG: CPT | Performed by: INTERNAL MEDICINE

## 2020-01-15 PROCEDURE — 1160F RVW MEDS BY RX/DR IN RCRD: CPT | Performed by: INTERNAL MEDICINE

## 2020-01-15 RX ORDER — FINASTERIDE 5 MG/1
5 TABLET, FILM COATED ORAL DAILY
Qty: 90 TABLET | Refills: 2 | Status: SHIPPED | OUTPATIENT
Start: 2020-01-15 | End: 2020-12-07 | Stop reason: SDUPTHER

## 2020-01-15 RX ORDER — CARVEDILOL 12.5 MG/1
12.5 TABLET ORAL 2 TIMES DAILY
Qty: 180 TABLET | Refills: 2 | Status: SHIPPED | OUTPATIENT
Start: 2020-01-15 | End: 2020-08-19 | Stop reason: SDUPTHER

## 2020-01-15 RX ORDER — OLMESARTAN MEDOXOMIL AND HYDROCHLOROTHIAZIDE 40/25 40; 25 MG/1; MG/1
1 TABLET ORAL DAILY
Qty: 90 TABLET | Refills: 2 | Status: SHIPPED | OUTPATIENT
Start: 2020-01-15 | End: 2020-12-07 | Stop reason: SDUPTHER

## 2020-01-15 RX ORDER — MIRTAZAPINE 15 MG/1
15 TABLET, FILM COATED ORAL DAILY
Qty: 90 TABLET | Refills: 2 | Status: SHIPPED | OUTPATIENT
Start: 2020-01-15 | End: 2020-12-07 | Stop reason: SDUPTHER

## 2020-01-15 RX ORDER — ATORVASTATIN CALCIUM 40 MG/1
40 TABLET, FILM COATED ORAL DAILY
Qty: 90 TABLET | Refills: 2 | Status: SHIPPED | OUTPATIENT
Start: 2020-01-15 | End: 2020-12-07 | Stop reason: SDUPTHER

## 2020-01-15 RX ORDER — AMLODIPINE BESYLATE 5 MG/1
5 TABLET ORAL DAILY
Qty: 90 TABLET | Refills: 2 | Status: SHIPPED | OUTPATIENT
Start: 2020-01-15 | End: 2020-12-07 | Stop reason: SDUPTHER

## 2020-01-15 NOTE — PATIENT INSTRUCTIONS

## 2020-01-15 NOTE — PROGRESS NOTES
INTERNAL MEDICINE FOLLOW-UP OFFICE VISIT  [facility]    NAME: Alissa Winston  AGE: 66 y o  SEX: male  : 1945     DATE: 1/15/2020     Assessment and Plan:     1  Benign essential hypertension    Blood pressure is well controlled  Continue current anti-hypertensives  Heart healthy diet encouraged  - amLODIPine (NORVASC) 5 mg tablet; Take 1 tablet (5 mg total) by mouth daily  Dispense: 90 tablet; Refill: 2  - olmesartan-hydrochlorothiazide (BENICAR HCT) 40-25 MG per tablet; Take 1 tablet by mouth daily  Dispense: 90 tablet; Refill: 2  - carvedilol (COREG) 12 5 mg tablet; Take 1 tablet (12 5 mg total) by mouth 2 (two) times a day  Dispense: 180 tablet; Refill: 2  - Lipid panel; Future  - Comprehensive metabolic panel; Future    2  Mixed hyperlipidemia    Cholesterol has been excellent  Continue high intensity statin  - atorvastatin (LIPITOR) 40 mg tablet; Take 1 tablet (40 mg total) by mouth daily  Dispense: 90 tablet; Refill: 2  - Lipid panel; Future  - Comprehensive metabolic panel; Future    3  Adjustment insomnia    Continue remeron as prescribed  - mirtazapine (REMERON) 15 mg tablet; Take 1 tablet (15 mg total) by mouth daily  Dispense: 90 tablet; Refill: 2    4  Impaired fasting glucose    Glucose was elevated at 119  Avoid excess carbohydrates  Will check A1C before next visit  - Hemoglobin A1C; Future    5  BPH with obstruction/lower urinary tract symptoms    Stable on finasteride  Will continue to monitor     - finasteride (PROSCAR) 5 mg tablet; Take 1 tablet (5 mg total) by mouth daily  Dispense: 90 tablet; Refill: 2    6  Atherosclerosis of native coronary artery of native heart without angina pectoris    Stable without angina  On appropriate cardiac regimen  Weight loss advised  7  Gait instability  8  History of stroke    Would recommend PT for gait/balance training with history of stroke  Patient declines at this time   Continue aspirin and statin for secondary stroke prevention  BMI Counseling: Body mass index is 31 79 kg/m²  The BMI is above normal  Nutrition recommendations include decreasing portion sizes, encouraging healthy choices of fruits and vegetables, limiting drinks that contain sugar, moderation in carbohydrate intake and increasing intake of lean protein  Exercise recommendations include exercising 3-5 times per week  Return in about 7 months (around 8/10/2020) for Subsequent AWV, Follow-up  Chief Complaint:     Chief Complaint   Patient presents with    Follow-up     6 month      History of Present Illness:     Patient presents for routine follow-up  No chest pain, shortness of breath, palpitations  Has problems with his balance since stroke  Hasn't gotten any worse and he hasn't fallen  Has declined physical therapy  No numbness/tingling in his feet/lower extremities  Denies any back pain  No bleeding episodes  Needs medication refills  Labs are stable except glucose was elevated at 119  Most recent A1c was 5 0 % on 4/2/2019  Review of Systems:     Review of Systems   Constitutional: Negative for activity change, appetite change and fatigue  Respiratory: Negative for apnea, cough, chest tightness, shortness of breath and wheezing  Cardiovascular: Negative for chest pain, palpitations and leg swelling  Gastrointestinal: Negative for abdominal distention, abdominal pain, blood in stool, constipation, diarrhea, nausea and vomiting  Musculoskeletal: Positive for gait problem (balance problems)  Negative for arthralgias, back pain, joint swelling and myalgias  Skin: Negative for rash and wound  Neurological: Negative for dizziness, weakness, light-headedness, numbness and headaches  Psychiatric/Behavioral: Positive for sleep disturbance  Negative for behavioral problems, confusion, hallucinations and suicidal ideas  The patient is not nervous/anxious         Problem List:     Patient Active Problem List   Diagnosis    Allergic rhinitis    Mild aortic stenosis    Atherosclerotic heart disease of native coronary artery without angina pectoris    Benign essential hypertension    BPH with obstruction/lower urinary tract symptoms    Gait instability    Hyperlipidemia    Insomnia    Male erectile dysfunction    Pulmonary nodule seen on imaging study    History of stroke      Objective:     /60 (BP Location: Left arm, Patient Position: Sitting, Cuff Size: Standard)   Pulse 62   Wt 106 kg (234 lb 6 4 oz) Comment: w/ shoes denied off  SpO2 98%   BMI 31 79 kg/m²     Physical Exam   Constitutional: He is oriented to person, place, and time  He appears well-developed and well-nourished  No distress  Obesity   Eyes: Conjunctivae are normal  Right eye exhibits no discharge  Left eye exhibits no discharge  No scleral icterus  Neck: Neck supple  No JVD present  No thyromegaly present  Cardiovascular: Normal rate, regular rhythm and normal heart sounds  No murmur heard  Pulmonary/Chest: Effort normal and breath sounds normal  No respiratory distress  He has no wheezes  He has no rales  He exhibits no tenderness  Abdominal: Soft  Bowel sounds are normal  He exhibits no distension  There is no tenderness  Musculoskeletal: He exhibits no edema  Lymphadenopathy:     He has no cervical adenopathy  Neurological: He is alert and oriented to person, place, and time  Skin: Skin is warm and dry  He is not diaphoretic  Psychiatric: He has a normal mood and affect  His behavior is normal    Vitals reviewed      Rupali Kaufman DO  [facility]

## 2020-03-25 DIAGNOSIS — I25.10 CORONARY ARTERY DISEASE, ANGINA PRESENCE UNSPECIFIED, UNSPECIFIED VESSEL OR LESION TYPE, UNSPECIFIED WHETHER NATIVE OR TRANSPLANTED HEART: ICD-10-CM

## 2020-03-25 RX ORDER — CLOPIDOGREL BISULFATE 75 MG/1
75 TABLET ORAL DAILY
Qty: 90 TABLET | Refills: 3 | Status: SHIPPED | OUTPATIENT
Start: 2020-03-25 | End: 2020-12-07 | Stop reason: SDUPTHER

## 2020-08-18 ENCOUNTER — TELEPHONE (OUTPATIENT)
Dept: INTERNAL MEDICINE CLINIC | Facility: CLINIC | Age: 75
End: 2020-08-18

## 2020-08-18 NOTE — PROGRESS NOTES
Assessment and Plan:     1  Medicare annual wellness visit, subsequent  2  Gait instability  3  History of stroke    BMI Counseling: Body mass index is 31 65 kg/m²  The BMI is above normal  Nutrition recommendations include decreasing portion sizes, encouraging healthy choices of fruits and vegetables, limiting drinks that contain sugar, moderation in carbohydrate intake and increasing intake of lean protein  Falls Plan of Care: balance, strength, and gait training instructions were provided  Medications that increase falls were reviewed  Patient refuses physical therapy  Preventive health issues were discussed with patient, and age appropriate screening tests were ordered as noted in patient's After Visit Summary  Personalized health advice and appropriate referrals for health education or preventive services given if needed, as noted in patient's After Visit Summary       History of Present Illness:     Patient presents for Medicare Annual Wellness visit    Patient Care Team:  Samantha Roberts DO as PCP - General (Internal Medicine)  Mel Negrete MD (Cardiology)  Kingsley Sena MD (Neurology)     Problem List:     Patient Active Problem List   Diagnosis    Allergic rhinitis    Mild aortic stenosis    Atherosclerotic heart disease of native coronary artery without angina pectoris    Benign essential hypertension    BPH with obstruction/lower urinary tract symptoms    Gait instability    Hyperlipidemia    Insomnia    Male erectile dysfunction    Pulmonary nodule seen on imaging study    History of stroke      Past Medical and Surgical History:     Past Medical History:   Diagnosis Date    Arthritis     Cardiac disorder     Hypertension     Stroke (cerebrum) ([address] )      Past Surgical History:   Procedure Laterality Date    CORONARY ARTERY BYPASS GRAFT  2004    x2 LIMA-LAD, SVG-RCA    TONSILLECTOMY        Family History:     Family History   Problem Relation Age of Onset    Coronary artery disease Mother     Arthritis Mother     Hypertension Mother     Stroke Mother     Other Father         aortic valve replacement; CABG    Arthritis Father     Hypertension Brother     Stroke Brother       Social History:     E-Cigarette/Vaping    E-Cigarette Use Never User      E-Cigarette/Vaping Substances    Nicotine No     THC No     CBD No     Flavoring No     Other No     Unknown No      Social History     Socioeconomic History    Marital status: Single     Spouse name: None    Number of children: None    Years of education: None    Highest education level: None   Occupational History    None   Social Needs    Financial resource strain: None    Food insecurity     Worry: None     Inability: None    Transportation needs     Medical: None     Non-medical: None   Tobacco Use    Smoking status: Former Smoker     Packs/day: 1 50     Years: 4 00     Pack years: 6 00     Types: Cigarettes     Last attempt to quit: 1960     Years since quittin 6    Smokeless tobacco: Never Used    Tobacco comment: former light tobacco smoker   Substance and Sexual Activity    Alcohol use: No    Drug use: No    Sexual activity: Never   Lifestyle    Physical activity     Days per week: 7 days     Minutes per session: 20 min    Stress:  To some extent   Relationships    Social connections     Talks on phone: None     Gets together: None     Attends [employer] service: None     Active member of club or organization: None     Attends meetings of clubs or organizations: None     Relationship status: None    Intimate partner violence     Fear of current or ex partner: None     Emotionally abused: None     Physically abused: None     Forced sexual activity: None   Other Topics Concern    None   Social History Narrative    Caffeine use    No advance directives      Medications and Allergies:     Current Outpatient Medications   Medication Sig Dispense Refill    amLODIPine (NORVASC) 5 mg tablet Take 1 tablet (5 mg total) by mouth daily 90 tablet 2    aspirin (ASPIRIN LOW DOSE) 81 mg EC tablet Take 1 tablet by mouth daily      atorvastatin (LIPITOR) 40 mg tablet Take 1 tablet (40 mg total) by mouth daily 90 tablet 2    carvedilol (COREG) 12 5 mg tablet Take 1 tablet (12 5 mg total) by mouth 2 (two) times a day 180 tablet 2    Cholecalciferol (VITAMIN D3) 5000 units CAPS Take by mouth daily       clopidogrel (PLAVIX) 75 mg tablet Take 1 tablet (75 mg total) by mouth daily 90 tablet 3    finasteride (PROSCAR) 5 mg tablet Take 1 tablet (5 mg total) by mouth daily 90 tablet 2    mirtazapine (REMERON) 15 mg tablet Take 1 tablet (15 mg total) by mouth daily 90 tablet 2    olmesartan-hydrochlorothiazide (BENICAR HCT) 40-25 MG per tablet Take 1 tablet by mouth daily 90 tablet 2    COPPER PO Take 2 mg by mouth daily      Omega-3 Fatty Acids (FISH OIL) 1200 MG CPDR Take 1 capsule by mouth daily       No current facility-administered medications for this visit  No Known Allergies   Immunizations:     Immunization History   Administered Date(s) Administered    INFLUENZA 09/19/2014, 10/04/2017, 10/08/2018    Influenza TIV (IM) 01/01/2014, 09/20/2016    Influenza, injectable, quadrivalent, preservative free 0 5 mL 10/07/2019    Pneumococcal Conjugate 13-Valent 08/09/2019    Pneumococcal Polysaccharide PPV23 10/02/2014    Tdap 1945, 03/21/2016    Zoster 1945      Health Maintenance: There are no preventive care reminders to display for this patient  Topic Date Due    Influenza Vaccine  07/01/2020      Medicare Health Risk Assessment:     There were no vitals taken for this visit  Rayne Hill is here for his Subsequent Wellness visit  Health Risk Assessment:   Patient rates overall health as good  Patient feels that their physical health rating is same  Eyesight was rated as same  Hearing was rated as same  Patient feels that their emotional and mental health rating is same   Pain experienced in the last 7 days has been none  Patient states that he has experienced no weight loss or gain in last 6 months  Depression Screening:   PHQ-2 Score: 0      Fall Risk Screening: In the past year, patient has experienced: history of falling in past year    Number of falls: 2 or more  Injured during fall?: No    Feels unsteady when standing or walking?: No    Worried about falling?: Yes      Home Safety:  Patient does not have trouble with stairs inside or outside of their home  Patient has working smoke alarms and has no working carbon monoxide detector  Home safety hazards include: none  Nutrition:   Current diet is Unhealthy and Frequent junk food  Medications:   Patient is currently taking over-the-counter supplements  OTC medications include: see medication list  Patient is able to manage medications  Activities of Daily Living (ADLs)/Instrumental Activities of Daily Living (IADLs):   Walk and transfer into and out of bed and chair?: Yes  Dress and groom yourself?: Yes    Bathe or shower yourself?: Yes    Feed yourself?  Yes  Do your laundry/housekeeping?: Yes  Manage your money, pay your bills and track your expenses?: Yes  Make your own meals?: Yes    Do your own shopping?: Yes    Durable Medical Equipment Suppliers  none    Previous Hospitalizations:   Any hospitalizations or ED visits within the last 12 months?: No      Advance Care Planning:   Living will: No    Durable POA for healthcare: No    Advanced directive: No    Five wishes given: Yes      Cognitive Screening:   Provider or family/friend/caregiver concerned regarding cognition?: No    PREVENTIVE SCREENINGS      Cardiovascular Screening:    General: Screening Not Indicated and History Lipid Disorder      Diabetes Screening:     General: Screening Current      Colorectal Cancer Screening:     General: Screening Current      Prostate Cancer Screening:    General: Screening Not Indicated      Osteoporosis Screening: General: Screening Not Indicated      Abdominal Aortic Aneurysm (AAA) Screening:    Risk factors include: tobacco use        General: Screening Not Indicated      Lung Cancer Screening:     General: Screening Not Indicated      Hepatitis C Screening:    General: Screening Not Indicated    Other Counseling Topics:   Car/seat belt/driving safety, skin self-exam, sunscreen and regular weightbearing exercise         Claire Armando DO

## 2020-08-19 ENCOUNTER — OFFICE VISIT (OUTPATIENT)
Dept: INTERNAL MEDICINE CLINIC | Facility: CLINIC | Age: 75
End: 2020-08-19
Payer: COMMERCIAL

## 2020-08-19 VITALS
WEIGHT: 233.4 LBS | HEART RATE: 59 BPM | DIASTOLIC BLOOD PRESSURE: 80 MMHG | OXYGEN SATURATION: 97 % | TEMPERATURE: 97.3 F | SYSTOLIC BLOOD PRESSURE: 122 MMHG | BODY MASS INDEX: 31.65 KG/M2

## 2020-08-19 DIAGNOSIS — E78.2 MIXED HYPERLIPIDEMIA: Chronic | ICD-10-CM

## 2020-08-19 DIAGNOSIS — R26.81 GAIT INSTABILITY: ICD-10-CM

## 2020-08-19 DIAGNOSIS — I35.0 MODERATE AORTIC STENOSIS: Chronic | ICD-10-CM

## 2020-08-19 DIAGNOSIS — I25.10 ATHEROSCLEROSIS OF NATIVE CORONARY ARTERY OF NATIVE HEART WITHOUT ANGINA PECTORIS: Chronic | ICD-10-CM

## 2020-08-19 DIAGNOSIS — Z00.00 MEDICARE ANNUAL WELLNESS VISIT, SUBSEQUENT: ICD-10-CM

## 2020-08-19 DIAGNOSIS — I10 BENIGN ESSENTIAL HYPERTENSION: Primary | Chronic | ICD-10-CM

## 2020-08-19 DIAGNOSIS — R91.1 PULMONARY NODULE SEEN ON IMAGING STUDY: Chronic | ICD-10-CM

## 2020-08-19 DIAGNOSIS — Z86.73 HISTORY OF STROKE: Chronic | ICD-10-CM

## 2020-08-19 PROCEDURE — 1036F TOBACCO NON-USER: CPT | Performed by: INTERNAL MEDICINE

## 2020-08-19 PROCEDURE — G0439 PPPS, SUBSEQ VISIT: HCPCS | Performed by: INTERNAL MEDICINE

## 2020-08-19 PROCEDURE — 4040F PNEUMOC VAC/ADMIN/RCVD: CPT | Performed by: INTERNAL MEDICINE

## 2020-08-19 PROCEDURE — 1160F RVW MEDS BY RX/DR IN RCRD: CPT | Performed by: INTERNAL MEDICINE

## 2020-08-19 PROCEDURE — 1125F AMNT PAIN NOTED PAIN PRSNT: CPT | Performed by: INTERNAL MEDICINE

## 2020-08-19 PROCEDURE — 99214 OFFICE O/P EST MOD 30 MIN: CPT | Performed by: INTERNAL MEDICINE

## 2020-08-19 PROCEDURE — 3074F SYST BP LT 130 MM HG: CPT | Performed by: INTERNAL MEDICINE

## 2020-08-19 PROCEDURE — 3725F SCREEN DEPRESSION PERFORMED: CPT | Performed by: INTERNAL MEDICINE

## 2020-08-19 PROCEDURE — 1170F FXNL STATUS ASSESSED: CPT | Performed by: INTERNAL MEDICINE

## 2020-08-19 PROCEDURE — 3079F DIAST BP 80-89 MM HG: CPT | Performed by: INTERNAL MEDICINE

## 2020-08-19 RX ORDER — CARVEDILOL 6.25 MG/1
6.25 TABLET ORAL 2 TIMES DAILY
Qty: 180 TABLET | Refills: 3 | Status: SHIPPED | OUTPATIENT
Start: 2020-08-19 | End: 2020-12-07 | Stop reason: SDUPTHER

## 2020-08-19 NOTE — PROGRESS NOTES
[facility]    NAME: Dano Winston  AGE: 66 y o  SEX: male  : 1945     DATE: 2020     Assessment and Plan:     1  Benign essential hypertension    Continue antihypertensives as prescribed  Should monitor his blood pressure at home  - carvedilol (COREG) 6 25 mg tablet; Take 1 tablet (6 25 mg total) by mouth 2 (two) times a day  Dispense: 180 tablet; Refill: 3    2  Atherosclerosis of native coronary artery of native heart without angina pectoris    Stable without angina  Should follow-up with cardiology  Continue aspirin, atorvastatin, carvedilol, clopidogrel, almost certain  3  Moderate aortic stenosis    Echocardiogram last year had shown evidence of moderate aortic stenosis  Will repeat echocardiogram for this year  Follow-up with cardiology  - Echo complete with contrast if indicated; Future    4  Pulmonary nodule seen on imaging study    Check up-to-date CT scan to evaluate prior pulmonary nodule seen on imaging study  - CT lung nodule follow-up; Future    5  Mixed hyperlipidemia    Continue atorvastatin as prescribed  Check up-to-date lab work  Return in about 6 months (around 2021) for Follow-up  Chief Complaint:     Chief Complaint   Patient presents with    Medicare Wellness Visit    Follow-up     8 month        History of Present Illness:     Patient presents for routine follow-up  Did not get labs done before today's visit  Has history of hypertension, coronary artery disease, moderate aortic stenosis, pulmonary nodule seen on imaging study, hyperlipidemia, and prior stroke  Patient continues to struggle with his gait at times  He has refused physical therapy  He tries to walk on a frequent basis  He also gets episodes of dizziness  He usually gets dizziness every 2-3 months that comes out of the blue  Patient states this was happening even before his stroke    He denies any chest pain, shortness of breath, palpitations  Previous echocardiogram last year had shown evidence of moderate aortic stenosis  He has some trace edema in his lower extremities  He is obese with a BMI of 31 65  He was a prior smoker  He is taking his medications as prescribed  Review of Systems:     Review of Systems   Constitutional: Negative for activity change, appetite change and fatigue  Respiratory: Negative for apnea, cough, chest tightness, shortness of breath and wheezing  Cardiovascular: Negative for chest pain, palpitations and leg swelling  Gastrointestinal: Negative for abdominal distention, abdominal pain, blood in stool, constipation, diarrhea, nausea and vomiting  Musculoskeletal: Positive for gait problem  Negative for arthralgias and back pain  Neurological: Positive for dizziness  Negative for weakness, numbness and headaches  Psychiatric/Behavioral: Negative for behavioral problems, confusion, hallucinations, sleep disturbance and suicidal ideas  The patient is not nervous/anxious  Objective:     /80 (BP Location: Left arm, Patient Position: Sitting, Cuff Size: Standard)   Pulse 59   Temp (!) 97 3 °F (36 3 °C) (Temporal) Comment: w/ aspirin  Wt 106 kg (233 lb 6 4 oz) Comment: w/ shoes denied off  SpO2 97%   BMI 31 65 kg/m²     Physical Exam  Vitals signs reviewed  Constitutional:       General: He is not in acute distress  Appearance: He is well-developed  He is not diaphoretic  Neck:      Musculoskeletal: Neck supple  Thyroid: No thyromegaly  Vascular: No JVD  Cardiovascular:      Rate and Rhythm: Normal rate and regular rhythm  Heart sounds: Normal heart sounds  No murmur  Pulmonary:      Effort: Pulmonary effort is normal  No respiratory distress  Breath sounds: Normal breath sounds  No wheezing or rales  Abdominal:      General: Bowel sounds are normal  There is no distension  Palpations: Abdomen is soft  Tenderness:  There is no abdominal tenderness  Musculoskeletal:      Right lower leg: No edema  Left lower leg: No edema  Lymphadenopathy:      Cervical: No cervical adenopathy  Skin:     General: Skin is warm and dry  Neurological:      Mental Status: He is alert     Psychiatric:         Mood and Affect: Mood normal          Behavior: Behavior normal        Justin De La Fuente   MEDICAL [id number]

## 2020-08-19 NOTE — PATIENT INSTRUCTIONS
Medicare Preventive Visit Patient Instructions  Thank you for completing your Welcome to Medicare Visit or Medicare Annual Wellness Visit today  Your next wellness visit will be due in one year (8/19/2021)  The screening/preventive services that you may require over the next 5-10 years are detailed below  Some tests may not apply to you based off risk factors and/or age  Screening tests ordered at today's visit but not completed yet may show as past due  Also, please note that scanned in results may not display below  Preventive Screenings:  Service Recommendations Previous Testing/Comments   Colorectal Cancer Screening  · Colonoscopy    · Fecal Occult Blood Test (FOBT)/Fecal Immunochemical Test (FIT)  · Fecal DNA/Cologuard Test  · Flexible Sigmoidoscopy Age: 54-65 years old   Colonoscopy: every 10 years (May be performed more frequently if at higher risk)  OR  FOBT/FIT: every 1 year  OR  Cologuard: every 3 years  OR  Sigmoidoscopy: every 5 years  Screening may be recommended earlier than age 48 if at higher risk for colorectal cancer  Also, an individualized decision between you and your healthcare provider will decide whether screening between the ages of 74-80 would be appropriate   Colonoscopy: Not on file  FOBT/FIT: Not on file  Cologuard: 07/15/2019  Sigmoidoscopy: Not on file     Screening current         Prostate Cancer Screening Individualized decision between patient and health care provider in men between ages of 53-78   Medicare will cover every 12 months beginning on the day after your 50th birthday PSA: 0 2 ng/mL     Screening Not Indicated     Hepatitis C Screening Once for adults born between 1945 and 1965  More frequently in patients at high risk for Hepatitis C Hep C Antibody: Not on file    Screening not indicated     Diabetes Screening 1-2 times per year if you're at risk for diabetes or have pre-diabetes Fasting glucose: No results in last 5 years   A1C: 5 0 %    Screening Current   Cholesterol Screening Once every 5 years if you don't have a lipid disorder  May order more often based on risk factors  Lipid panel: 12/13/2019    Screening Not Indicated  History Lipid Disorder      Other Preventive Screenings Covered by Medicare:  1  Abdominal Aortic Aneurysm (AAA) Screening: covered once if your at risk  You're considered to be at risk if you have a family history of AAA or a male between the age of 73-68 who smoking at least 100 cigarettes in your lifetime  2  Lung Cancer Screening: covers low dose CT scan once per year if you meet all of the following conditions: (1) Age 50-69; (2) No signs or symptoms of lung cancer; (3) Current smoker or have quit smoking within the last 15 years; (4) You have a tobacco smoking history of at least 30 pack years (packs per day x number of years you smoked); (5) You get a written order from a healthcare provider  3  Glaucoma Screening: covered annually if you're considered high risk: (1) You have diabetes OR (2) Family history of glaucoma OR (3)  aged 48 and older OR (3)  American aged 72 and older  3  Osteoporosis Screening: covered every 2 years if you meet one of the following conditions: (1) Have a vertebral abnormality; (2) On glucocorticoid therapy for more than 3 months; (3) Have primary hyperparathyroidism; (4) On osteoporosis medications and need to assess response to drug therapy  5  HIV Screening: covered annually if you're between the age of 12-76  Also covered annually if you are younger than 13 and older than 72 with risk factors for HIV infection  For pregnant patients, it is covered up to 3 times per pregnancy      Immunizations:  Immunization Recommendations   Influenza Vaccine Annual influenza vaccination during flu season is recommended for all persons aged >= 6 months who do not have contraindications   Pneumococcal Vaccine (Prevnar and Pneumovax)  * Prevnar = PCV13  * Pneumovax = PPSV23 Adults 25-60 years old: 1-3 doses may be recommended based on certain risk factors  Adults 72 years old: Prevnar (PCV13) vaccine recommended followed by Pneumovax (PPSV23) vaccine  If already received PPSV23 since turning 65, then PCV13 recommended at least one year after PPSV23 dose  Hepatitis B Vaccine 3 dose series if at intermediate or high risk (ex: diabetes, end stage renal disease, liver disease)   Tetanus (Td) Vaccine - COST NOT COVERED BY MEDICARE PART B Following completion of primary series, a booster dose should be given every 10 years to maintain immunity against tetanus  Td may also be given as tetanus wound prophylaxis  Tdap Vaccine - COST NOT COVERED BY MEDICARE PART B Recommended at least once for all adults  For pregnant patients, recommended with each pregnancy  Shingles Vaccine (Shingrix) - COST NOT COVERED BY MEDICARE PART B  2 shot series recommended in those aged 48 and above     Health Maintenance Due:  There are no preventive care reminders to display for this patient  Immunizations Due:      Topic Date Due    Influenza Vaccine  07/01/2020     Advance Directives   What are advance directives? Advance directives are legal documents that state your wishes and plans for medical care  These plans are made ahead of time in case you lose your ability to make decisions for yourself  Advance directives can apply to any medical decision, such as the treatments you want, and if you want to donate organs  What are the types of advance directives? There are many types of advance directives, and each state has rules about how to use them  You may choose a combination of any of the following:  · Living will: This is a written record of the treatment you want  You can also choose which treatments you do not want, which to limit, and which to stop at a certain time  This includes surgery, medicine, IV fluid, and tube feedings  · Durable power of  for healthcare [facility]):   This is a written record that states who you want to make healthcare choices for you when you are unable to make them for yourself  This person, called a proxy, is usually a family member or a friend  You may choose more than 1 proxy  · Do not resuscitate (DNR) order:  A DNR order is used in case your heart stops beating or you stop breathing  It is a request not to have certain forms of treatment, such as CPR  A DNR order may be included in other types of advance directives  · Medical directive: This covers the care that you want if you are in a coma, near death, or unable to make decisions for yourself  You can list the treatments you want for each condition  Treatment may include pain medicine, surgery, blood transfusions, dialysis, IV or tube feedings, and a ventilator (breathing machine)  · Values history: This document has questions about your views, beliefs, and how you feel and think about life  This information can help others choose the care that you would choose  Why are advance directives important? An advance directive helps you control your care  Although spoken wishes may be used, it is better to have your wishes written down  Spoken wishes can be misunderstood, or not followed  Treatments may be given even if you do not want them  An advance directive may make it easier for your family to make difficult choices about your care  Fall Prevention    Fall prevention  includes ways to make your home and other areas safer  It also includes ways you can move more carefully to prevent a fall  Health conditions that cause changes in your blood pressure, vision, or muscle strength and coordination may increase your risk for falls  Medicines may also increase your risk for falls if they make you dizzy, weak, or sleepy  Fall prevention tips:   · Stand or sit up slowly  · Use assistive devices as directed  · Wear shoes that fit well and have soles that   · Wear a personal alarm  · Stay active  · Manage your medical conditions  Home Safety Tips:  · Add items to prevent falls in the bathroom  · Keep paths clear  · Install bright lights in your home  · Keep items you use often on shelves within reach  · Paint or place reflective tape on the edges of your stairs  Weight Management   Why it is important to manage your weight:  Being overweight increases your risk of health conditions such as heart disease, high blood pressure, type 2 diabetes, and certain types of cancer  It can also increase your risk for osteoarthritis, sleep apnea, and other respiratory problems  Aim for a slow, steady weight loss  Even a small amount of weight loss can lower your risk of health problems  How to lose weight safely:  A safe and healthy way to lose weight is to eat fewer calories and get regular exercise  You can lose up about 1 pound a week by decreasing the number of calories you eat by 500 calories each day  Healthy meal plan for weight management:  A healthy meal plan includes a variety of foods, contains fewer calories, and helps you stay healthy  A healthy meal plan includes the following:  · Eat whole-grain foods more often  A healthy meal plan should contain fiber  Fiber is the part of grains, fruits, and vegetables that is not broken down by your body  Whole-grain foods are healthy and provide extra fiber in your diet  Some examples of whole-grain foods are whole-wheat breads and pastas, oatmeal, brown rice, and bulgur  · Eat a variety of vegetables every day  Include dark, leafy greens such as spinach, kale, matt greens, and mustard greens  Eat yellow and orange vegetables such as carrots, sweet potatoes, and winter squash  · Eat a variety of fruits every day  Choose fresh or canned fruit (canned in its own juice or light syrup) instead of juice  Fruit juice has very little or no fiber  · Eat low-fat dairy foods  Drink fat-free (skim) milk or 1% milk  Eat fat-free yogurt and low-fat cottage cheese   Try low-fat cheeses such as mozzarella and other reduced-fat cheeses  · Choose meat and other protein foods that are low in fat  Choose beans or other legumes such as split peas or lentils  Choose fish, skinless poultry (chicken or turkey), or lean cuts of red meat (beef or pork)  Before you cook meat or poultry, cut off any visible fat  · Use less fat and oil  Try baking foods instead of frying them  Add less fat, such as margarine, sour cream, regular salad dressing and mayonnaise to foods  Eat fewer high-fat foods  Some examples of high-fat foods include french fries, doughnuts, ice cream, and cakes  · Eat fewer sweets  Limit foods and drinks that are high in sugar  This includes candy, cookies, regular soda, and sweetened drinks  Exercise:  Exercise at least 30 minutes per day on most days of the week  Some examples of exercise include walking, biking, dancing, and swimming  You can also fit in more physical activity by taking the stairs instead of the elevator or parking farther away from stores  Ask your healthcare provider about the best exercise plan for you  © Copyright [employer] 2018 Information is for End User's use only and may not be sold, redistributed or otherwise used for commercial purposes   All illustrations and images included in CareNotes® are the copyrighted property of A D A M , Inc  or [address] [place] [address] [facility]

## 2020-08-21 ENCOUNTER — TELEPHONE (OUTPATIENT)
Dept: INTERNAL MEDICINE CLINIC | Facility: CLINIC | Age: 75
End: 2020-08-21

## 2020-08-21 LAB
ALBUMIN SERPL-MCNC: 4.2 G/DL (ref 3.7–4.7)
ALBUMIN/GLOB SERPL: 1.7 {RATIO} (ref 1.2–2.2)
ALP SERPL-CCNC: 109 IU/L (ref 39–117)
ALT SERPL-CCNC: 31 IU/L (ref 0–44)
AST SERPL-CCNC: 33 IU/L (ref 0–40)
BILIRUB SERPL-MCNC: 1.2 MG/DL (ref 0–1.2)
BUN SERPL-MCNC: 20 MG/DL (ref 8–27)
BUN/CREAT SERPL: 20 (ref 10–24)
CALCIUM SERPL-MCNC: 9 MG/DL (ref 8.6–10.2)
CHLORIDE SERPL-SCNC: 101 MMOL/L (ref 96–106)
CHOLEST SERPL-MCNC: 120 MG/DL (ref 100–199)
CO2 SERPL-SCNC: 23 MMOL/L (ref 20–29)
CREAT SERPL-MCNC: 1 MG/DL (ref 0.76–1.27)
GLOBULIN SER-MCNC: 2.5 G/DL (ref 1.5–4.5)
GLUCOSE SERPL-MCNC: 143 MG/DL (ref 65–99)
HBA1C MFR BLD: 5.1 % (ref 4.8–5.6)
HDLC SERPL-MCNC: 34 MG/DL
LDLC SERPL CALC-MCNC: 50 MG/DL (ref 0–99)
POTASSIUM SERPL-SCNC: 3.9 MMOL/L (ref 3.5–5.2)
PROT SERPL-MCNC: 6.7 G/DL (ref 6–8.5)
SL AMB EGFR AFRICAN AMERICAN: 84 ML/MIN/1.73
SL AMB EGFR NON AFRICAN AMERICAN: 73 ML/MIN/1.73
SL AMB VLDL CHOLESTEROL CALC: 36 MG/DL (ref 5–40)
SODIUM SERPL-SCNC: 138 MMOL/L (ref 134–144)
TRIGL SERPL-MCNC: 182 MG/DL (ref 0–149)

## 2020-08-21 NOTE — TELEPHONE ENCOUNTER
----- Message from Joslyn Holland DO sent at 8/21/2020  2:49 PM EDT -----  Call patient and let him know that his labs look good  Cholesterol remains normal   Liver function, kidney function, and electrolytes look good  No evidence of diabetes or prediabetes

## 2020-09-01 ENCOUNTER — HOSPITAL ENCOUNTER (OUTPATIENT)
Dept: CT IMAGING | Facility: CLINIC | Age: 75
Discharge: HOME | End: 2020-09-01
Payer: COMMERCIAL

## 2020-09-01 DIAGNOSIS — R91.1 PULMONARY NODULE SEEN ON IMAGING STUDY: Chronic | ICD-10-CM

## 2020-09-01 PROCEDURE — 71250 CT THORAX DX C-: CPT

## 2020-09-01 PROCEDURE — G1004 CDSM NDSC: HCPCS

## 2020-09-03 ENCOUNTER — TELEPHONE (OUTPATIENT)
Dept: INTERNAL MEDICINE CLINIC | Facility: CLINIC | Age: 75
End: 2020-09-03

## 2020-09-04 ENCOUNTER — TELEPHONE (OUTPATIENT)
Dept: INTERNAL MEDICINE CLINIC | Facility: CLINIC | Age: 75
End: 2020-09-04

## 2020-09-15 ENCOUNTER — OFFICE VISIT (OUTPATIENT)
Dept: CARDIOLOGY CLINIC | Facility: CLINIC | Age: 75
End: 2020-09-15
Payer: COMMERCIAL

## 2020-09-15 VITALS
DIASTOLIC BLOOD PRESSURE: 72 MMHG | HEART RATE: 54 BPM | WEIGHT: 234 LBS | SYSTOLIC BLOOD PRESSURE: 126 MMHG | OXYGEN SATURATION: 97 % | HEIGHT: 72 IN | BODY MASS INDEX: 31.69 KG/M2

## 2020-09-15 DIAGNOSIS — I35.0 MILD AORTIC STENOSIS: ICD-10-CM

## 2020-09-15 DIAGNOSIS — I10 HYPERTENSION, ESSENTIAL: ICD-10-CM

## 2020-09-15 DIAGNOSIS — I25.10 ATHEROSCLEROSIS OF NATIVE CORONARY ARTERY OF NATIVE HEART WITHOUT ANGINA PECTORIS: Primary | ICD-10-CM

## 2020-09-15 PROCEDURE — 1036F TOBACCO NON-USER: CPT | Performed by: INTERNAL MEDICINE

## 2020-09-15 PROCEDURE — 99214 OFFICE O/P EST MOD 30 MIN: CPT | Performed by: INTERNAL MEDICINE

## 2020-09-15 PROCEDURE — 3078F DIAST BP <80 MM HG: CPT | Performed by: INTERNAL MEDICINE

## 2020-09-15 PROCEDURE — 1160F RVW MEDS BY RX/DR IN RCRD: CPT | Performed by: INTERNAL MEDICINE

## 2020-09-15 NOTE — PROGRESS NOTES
[facility] [address] [address]  Cardiology Follow Up    Jakob [facility]  1945  [id number]      1  Atherosclerosis of native coronary artery of native heart without angina pectoris     2  Mild aortic stenosis     3  Hypertension, essential         Chief Complaint   Patient presents with    Follow-up       Interval History:  Patient presents for follow-up visit  Patient has history of CAD status post CABG as well as history of moderate aortic stenosis  Patient denies any chest pain  No shortness of breath out of the ordinary  Patient does have some leg edema  Patient has a repeat echocardiogram scheduled through primary care physician at the end of this month  Patient has problems with gait instability secondary to history of stroke  He denies any history of palpitations or dizziness  No history of presyncope syncope  He states that he has been compliant with all his present medications      Patient Active Problem List   Diagnosis    Allergic rhinitis    Moderate aortic stenosis    Atherosclerotic heart disease of native coronary artery without angina pectoris    Benign essential hypertension    BPH with obstruction/lower urinary tract symptoms    Gait instability    Hyperlipidemia    Insomnia    Male erectile dysfunction    Pulmonary nodule seen on imaging study    History of stroke     Past Medical History:   Diagnosis Date    Arthritis     Cardiac disorder     Hypertension     Stroke (cerebrum) ([address] )      Social History     Socioeconomic History    Marital status: Single     Spouse name: Not on file    Number of children: Not on file    Years of education: Not on file    Highest education level: Not on file   Occupational History    Not on file   Social Needs    Financial resource strain: Not on file    Food insecurity     Worry: Not on file     Inability: Not on file    Transportation needs     Medical: Not on file Non-medical: Not on file   Tobacco Use    Smoking status: Former Smoker     Packs/day: 1 50     Years: 4 00     Pack years: 6 00     Types: Cigarettes     Last attempt to quit: 1960     Years since quittin 7    Smokeless tobacco: Never Used    Tobacco comment: former light tobacco smoker   Substance and Sexual Activity    Alcohol use: No    Drug use: No    Sexual activity: Never   Lifestyle    Physical activity     Days per week: 7 days     Minutes per session: 20 min    Stress:  To some extent   Relationships    Social connections     Talks on phone: Not on file     Gets together: Not on file     Attends [employer] service: Not on file     Active member of club or organization: Not on file     Attends meetings of clubs or organizations: Not on file     Relationship status: Not on file    Intimate partner violence     Fear of current or ex partner: Not on file     Emotionally abused: Not on file     Physically abused: Not on file     Forced sexual activity: Not on file   Other Topics Concern    Not on file   Social History Narrative    Caffeine use    No advance directives      Family History   Problem Relation Age of Onset    Coronary artery disease Mother     Arthritis Mother     Hypertension Mother     Stroke Mother     Other Father         aortic valve replacement; CABG    Arthritis Father     Hypertension Brother     Stroke Brother      Past Surgical History:   Procedure Laterality Date    CORONARY ARTERY BYPASS GRAFT  2004    x2 LIMA-LAD, SVG-RCA    TONSILLECTOMY         Current Outpatient Medications:     amLODIPine (NORVASC) 5 mg tablet, Take 1 tablet (5 mg total) by mouth daily, Disp: 90 tablet, Rfl: 2    aspirin (ASPIRIN LOW DOSE) 81 mg EC tablet, Take 1 tablet by mouth daily, Disp: , Rfl:     atorvastatin (LIPITOR) 40 mg tablet, Take 1 tablet (40 mg total) by mouth daily, Disp: 90 tablet, Rfl: 2    carvedilol (COREG) 6 25 mg tablet, Take 1 tablet (6 25 mg total) by mouth 2 (two) times a day, Disp: 180 tablet, Rfl: 3    Cholecalciferol (VITAMIN D3) 5000 units CAPS, Take by mouth daily , Disp: , Rfl:     clopidogrel (PLAVIX) 75 mg tablet, Take 1 tablet (75 mg total) by mouth daily, Disp: 90 tablet, Rfl: 3    finasteride (PROSCAR) 5 mg tablet, Take 1 tablet (5 mg total) by mouth daily, Disp: 90 tablet, Rfl: 2    mirtazapine (REMERON) 15 mg tablet, Take 1 tablet (15 mg total) by mouth daily, Disp: 90 tablet, Rfl: 2    olmesartan-hydrochlorothiazide (BENICAR HCT) 40-25 MG per tablet, Take 1 tablet by mouth daily, Disp: 90 tablet, Rfl: 2  No Known Allergies    Labs:  Orders Only on 08/20/2020   Component Date Value    Glucose, Random 08/20/2020 143*    BUN 08/20/2020 20     Creatinine 08/20/2020 1 00     eGFR Non  08/20/2020 73     eGFR  08/20/2020 84     SL AMB BUN/CREATININE RA* 08/20/2020 20     Sodium 08/20/2020 138     Potassium 08/20/2020 3 9     Chloride 08/20/2020 101     CO2 08/20/2020 23     CALCIUM 08/20/2020 9 0     Protein, Total 08/20/2020 6 7     Albumin 08/20/2020 4 2     Globulin, Total 08/20/2020 2 5     Albumin/Globulin Ratio 08/20/2020 1 7     TOTAL BILIRUBIN 08/20/2020 1 2     Alk Phos Isoenzymes 08/20/2020 109     AST 08/20/2020 33     ALT 08/20/2020 31     Cholesterol, Total 08/20/2020 120     Triglycerides 08/20/2020 182*    HDL 08/20/2020 34*    VLDL Cholesterol Calcula* 08/20/2020 36     LDL Calculated 08/20/2020 50     Hemoglobin A1C 08/20/2020 5 1      Imaging: Ct Lung Nodule Follow-up    Result Date: 9/4/2020  Narrative: CT CHEST WITHOUT IV CONTRAST INDICATION:   R91 1: Solitary pulmonary nodule  COMPARISON:  CT chest 3/25/2019 TECHNIQUE:  Unenhanced CT examination of the chest was performed utilizing a low radiation dose protocol  Axial, sagittal, and coronal 2D reformatted images were created from the source data and submitted for interpretation   Radiation dose length product (DLP) for this visit: 446 mGy-cm   This examination, like all CT scans performed in the [facility], was performed utilizing techniques to minimize radiation dose exposure, including the use of iterative reconstruction and automated exposure control  FINDINGS: LUNGS:  Stable left lower lobe 2 3 x 2 cm soft tissue nodule with punctate central calcifications unchanged when measured in the same fashion  No new suspicious pulmonary nodule  Stable trace biapical subpleural fibronodular scarring  Stable additional scattered areas of minimal subpleural scarring  No infiltrate  Stable tiny anterior right middle lobe bleb  Central airways are clear  PLEURA:  Stable minimal lingular pleural parenchymal scarring  HEART/GREAT VESSELS:  Sternal wire sutures and mediastinal clips are present compatible with prior coronary artery bypass graft surgery  Heart is not enlarged  No pericardial effusion  Aortic and coronary artery calcification  MEDIASTINUM AND MAC:  Unremarkable  CHEST WALL AND LOWER NECK:   Unremarkable  VISUALIZED STRUCTURES IN THE UPPER ABDOMEN:  Cholelithiasis  OSSEOUS STRUCTURES:  No acute fracture or destructive osseous lesion  Multilevel thoracic spondylosis  Impression: Stable left lower lobe pulmonary nodule unchanged since March 2019  Overall, no significant interval change   Workstation performed: [id number]       Review of Systems:  Review of Systems   REVIEW OF SYSTEMS:  Constitutional:  Denies fever or chills   Eyes:  Denies change in visual acuity   HENT:  Denies nasal congestion or sore throat   Respiratory:  Denies cough or shortness of breath   Cardiovascular:  Denies chest pain or edema   GI:  Denies abdominal pain, nausea, vomiting, bloody stools or diarrhea   :  Denies dysuria, frequency, difficulty in micturition and nocturia  Musculoskeletal:  Denies back pain or joint pain   Neurologic:  History of stroke with gait instability  Endocrine:  Denies polyuria or polydipsia   Lymphatic:  Denies swollen glands   Psychiatric:  Denies depression or anxiety     Physical Exam:    /72   Pulse (!) 54   Ht 6' (1 829 m)   Wt 106 kg (234 lb)   SpO2 97%   BMI 31 74 kg/m²     Physical Exam   PHYSICAL EXAM:  General:  Patient is not in acute distress   Head: Normocephalic, Atraumatic  HEENT:  Both pupils normal-size atraumatic, normocephalic, nonicteric  Neck:  JVP not raised  Trachea central  No carotid bruit  Respiratory:  normal breath sounds no crackles  no rhonchi  Cardiovascular:  Regular rate and rhythm no S3 3/6 systolic ejection murmur in the right sternal border  GI:  Abdomen soft nontender  No organomegaly  Lymphatic:  No cervical or inguinal lymphadenopathy  Neurologic:  Patient is awake alert, oriented   Grossly nonfocal  Extremities 1+ edema    Discussion/Summary:  Patient with multiple medical problems who seems to be doing reasonably well from cardiac standpoint  Previous studies reviewed with patient  Medications reviewed and possible side effects discussed  concepts of cardiovascular disease , signs and symptoms of heart disease  Dietary and risk factor modification reinforced  All questions answered  Safety measures reviewed  Patient advised to report any problems prompting medical attention  Will follow up with the results of the echocardiogram ordered through primary care physician later this month  Etiology and pathogenesis of the non rheumatic calcific aortic stenosis discussed at length with patient  If there is progression of aortic stenosis, patient may be candidate for TAVR in the future  Importance of salt restriction reinforced  Medications reviewed  Follow-up in 6 months  Follow-up with primary care physician  Patient is agreeable with the plan of care

## 2020-09-29 ENCOUNTER — TELEPHONE (OUTPATIENT)
Dept: INTERNAL MEDICINE CLINIC | Facility: CLINIC | Age: 75
End: 2020-09-29

## 2020-09-29 ENCOUNTER — HOSPITAL ENCOUNTER (OUTPATIENT)
Dept: NON INVASIVE DIAGNOSTICS | Facility: CLINIC | Age: 75
Discharge: HOME | End: 2020-09-29
Payer: COMMERCIAL

## 2020-09-29 DIAGNOSIS — I35.0 MODERATE AORTIC STENOSIS: Chronic | ICD-10-CM

## 2020-09-29 PROCEDURE — 93306 TTE W/DOPPLER COMPLETE: CPT | Performed by: INTERNAL MEDICINE

## 2020-09-29 PROCEDURE — 93306 TTE W/DOPPLER COMPLETE: CPT

## 2020-09-29 NOTE — TELEPHONE ENCOUNTER
----- Message from Abilio Ingram DO sent at 9/29/2020  1:52 PM EDT -----  Call patient and let him know echo is stable from 1 year ago

## 2020-12-07 DIAGNOSIS — I10 BENIGN ESSENTIAL HYPERTENSION: ICD-10-CM

## 2020-12-07 DIAGNOSIS — N13.8 BPH WITH OBSTRUCTION/LOWER URINARY TRACT SYMPTOMS: Chronic | ICD-10-CM

## 2020-12-07 DIAGNOSIS — F51.02 ADJUSTMENT INSOMNIA: ICD-10-CM

## 2020-12-07 DIAGNOSIS — N40.1 BPH WITH OBSTRUCTION/LOWER URINARY TRACT SYMPTOMS: Chronic | ICD-10-CM

## 2020-12-07 DIAGNOSIS — I25.10 CORONARY ARTERY DISEASE, ANGINA PRESENCE UNSPECIFIED, UNSPECIFIED VESSEL OR LESION TYPE, UNSPECIFIED WHETHER NATIVE OR TRANSPLANTED HEART: ICD-10-CM

## 2020-12-07 DIAGNOSIS — E78.2 MIXED HYPERLIPIDEMIA: ICD-10-CM

## 2020-12-07 RX ORDER — ATORVASTATIN CALCIUM 40 MG/1
40 TABLET, FILM COATED ORAL DAILY
Qty: 90 TABLET | Refills: 2 | Status: SHIPPED | OUTPATIENT
Start: 2020-12-07 | End: 2021-08-19 | Stop reason: SDUPTHER

## 2020-12-07 RX ORDER — FINASTERIDE 5 MG/1
5 TABLET, FILM COATED ORAL DAILY
Qty: 90 TABLET | Refills: 2 | Status: SHIPPED | OUTPATIENT
Start: 2020-12-07 | End: 2021-08-19 | Stop reason: SDUPTHER

## 2020-12-07 RX ORDER — AMLODIPINE BESYLATE 5 MG/1
5 TABLET ORAL DAILY
Qty: 90 TABLET | Refills: 2 | Status: SHIPPED | OUTPATIENT
Start: 2020-12-07 | End: 2021-08-19 | Stop reason: SDUPTHER

## 2020-12-07 RX ORDER — CLOPIDOGREL BISULFATE 75 MG/1
75 TABLET ORAL DAILY
Qty: 90 TABLET | Refills: 3 | Status: SHIPPED | OUTPATIENT
Start: 2020-12-07 | End: 2021-11-30 | Stop reason: SDUPTHER

## 2020-12-07 RX ORDER — OLMESARTAN MEDOXOMIL AND HYDROCHLOROTHIAZIDE 40/25 40; 25 MG/1; MG/1
1 TABLET ORAL DAILY
Qty: 90 TABLET | Refills: 2 | Status: SHIPPED | OUTPATIENT
Start: 2020-12-07 | End: 2021-08-19 | Stop reason: SDUPTHER

## 2020-12-07 RX ORDER — CARVEDILOL 6.25 MG/1
6.25 TABLET ORAL 2 TIMES DAILY
Qty: 180 TABLET | Refills: 3 | Status: SHIPPED | OUTPATIENT
Start: 2020-12-07 | End: 2021-08-19 | Stop reason: SDUPTHER

## 2020-12-07 RX ORDER — MIRTAZAPINE 15 MG/1
15 TABLET, FILM COATED ORAL DAILY
Qty: 90 TABLET | Refills: 2 | Status: SHIPPED | OUTPATIENT
Start: 2020-12-07 | End: 2021-08-19 | Stop reason: SDUPTHER

## 2021-01-27 DIAGNOSIS — Z23 ENCOUNTER FOR IMMUNIZATION: ICD-10-CM

## 2021-02-09 ENCOUNTER — IMMUNIZATIONS (OUTPATIENT)
Dept: FAMILY MEDICINE CLINIC | Facility: HOSPITAL | Age: 76
End: 2021-02-09

## 2021-02-09 DIAGNOSIS — Z23 ENCOUNTER FOR IMMUNIZATION: Primary | ICD-10-CM

## 2021-02-09 PROCEDURE — 91301 SARS-COV-2 / COVID-19 MRNA VACCINE (MODERNA) 100 MCG: CPT

## 2021-02-09 PROCEDURE — 0011A SARS-COV-2 / COVID-19 MRNA VACCINE (MODERNA) 100 MCG: CPT

## 2021-02-19 ENCOUNTER — TELEPHONE (OUTPATIENT)
Dept: INTERNAL MEDICINE CLINIC | Facility: CLINIC | Age: 76
End: 2021-02-19

## 2021-02-19 ENCOUNTER — OFFICE VISIT (OUTPATIENT)
Dept: INTERNAL MEDICINE CLINIC | Facility: CLINIC | Age: 76
End: 2021-02-19
Payer: COMMERCIAL

## 2021-02-19 VITALS
DIASTOLIC BLOOD PRESSURE: 62 MMHG | BODY MASS INDEX: 32.36 KG/M2 | OXYGEN SATURATION: 98 % | WEIGHT: 238.6 LBS | TEMPERATURE: 98.1 F | HEART RATE: 62 BPM | SYSTOLIC BLOOD PRESSURE: 116 MMHG

## 2021-02-19 DIAGNOSIS — I10 BENIGN ESSENTIAL HYPERTENSION: Primary | Chronic | ICD-10-CM

## 2021-02-19 DIAGNOSIS — E78.2 MIXED HYPERLIPIDEMIA: Chronic | ICD-10-CM

## 2021-02-19 DIAGNOSIS — I25.10 ATHEROSCLEROSIS OF NATIVE CORONARY ARTERY OF NATIVE HEART WITHOUT ANGINA PECTORIS: Chronic | ICD-10-CM

## 2021-02-19 DIAGNOSIS — I35.0 MODERATE AORTIC STENOSIS: Chronic | ICD-10-CM

## 2021-02-19 PROCEDURE — 99214 OFFICE O/P EST MOD 30 MIN: CPT | Performed by: INTERNAL MEDICINE

## 2021-02-19 PROCEDURE — 3078F DIAST BP <80 MM HG: CPT | Performed by: INTERNAL MEDICINE

## 2021-02-19 PROCEDURE — 1036F TOBACCO NON-USER: CPT | Performed by: INTERNAL MEDICINE

## 2021-02-19 PROCEDURE — 1160F RVW MEDS BY RX/DR IN RCRD: CPT | Performed by: INTERNAL MEDICINE

## 2021-02-19 PROCEDURE — 3725F SCREEN DEPRESSION PERFORMED: CPT | Performed by: INTERNAL MEDICINE

## 2021-02-19 PROCEDURE — 3074F SYST BP LT 130 MM HG: CPT | Performed by: INTERNAL MEDICINE

## 2021-02-19 NOTE — ASSESSMENT & PLAN NOTE
Lab Results   Component Value Date    LDLCALC 50 08/20/2020      LDL is excellent  Continue high intensity statin

## 2021-02-19 NOTE — PATIENT INSTRUCTIONS
Fall Prevention   WHAT YOU NEED TO KNOW:   Fall prevention includes ways to make your home and other areas safer  It also includes ways you can move more carefully to prevent a fall  Health conditions that cause changes in your blood pressure, vision, or muscle strength and coordination may increase your risk for falls  Medicines may also increase your risk for falls if they make you dizzy, weak, or sleepy  DISCHARGE INSTRUCTIONS:   Call 911 or have someone else call if:   · You have fallen and are unconscious  · You have fallen and cannot move part of your body  Contact your healthcare provider if:   · You have fallen and have pain or a headache  · You have questions or concerns about your condition or care  Fall prevention tips:   · Stand or sit up slowly  This may help you keep your balance and prevent falls  · Use assistive devices as directed  Your healthcare provider may suggest that you use a cane or walker to help you keep your balance  You may need to have grab bars put in your bathroom near the toilet or in the shower  · Wear shoes that fit well and have soles that   Wear shoes both inside and outside  Use slippers with good   Do not wear shoes with high heels  · Wear a personal alarm  This is a device that allows you to call 911 if you fall and need help  Ask your healthcare provider for more information  · Stay active  Exercise can help strengthen your muscles and improve your balance  Your healthcare provider may recommend water aerobics or walking  He or she may also recommend physical therapy to improve your coordination  Never start an exercise program without talking to your healthcare provider first          · Manage your medical conditions  Keep all appointments with your healthcare providers  Visit your eye doctor as directed  Home safety tips:       · Add items to prevent falls in the bathroom    Put nonslip strips on your bath or shower floor to prevent you from slipping  Use a bath mat if you do not have carpet in the bathroom  This will prevent you from falling when you step out of the bath or shower  Use a shower seat so you do not need to stand while you shower  Sit on the toilet or a chair in your bathroom to dry yourself and put on clothing  This will prevent you from losing your balance from drying or dressing yourself while you are standing  · Keep paths clear  Remove books, shoes, and other objects from walkways and stairs  Place cords for telephones and lamps out of the way so that you do not need to walk over them  Tape them down if you cannot move them  Remove small rugs  If you cannot remove a rug, secure it with double-sided tape  This will prevent you from tripping  · Install bright lights in your home  Use night lights to help light paths to the bathroom or kitchen  Always turn on the light before you start walking  · Keep items you use often on shelves within reach  Do not use a step stool to help you reach an item  · Paint or place reflective tape on the edges of your stairs  This will help you see the stairs better  Follow up with your healthcare provider as directed:  Write down your questions so you remember to ask them during your visits  © Copyright [address] Information is for End User's use only and may not be sold, redistributed or otherwise used for commercial purposes  All illustrations and images included in CareNotes® are the copyrighted property of [employer]  or Beverley Mccann   The above information is an  only  It is not intended as medical advice for individual conditions or treatments  Talk to your doctor, nurse or pharmacist before following any medical regimen to see if it is safe and effective for you

## 2021-02-19 NOTE — PROGRESS NOTES
[facility]    NAME: Eric Winston  AGE: 66 y o  SEX: male  : 1945     DATE: 2021     Assessment and Plan: Moderate aortic stenosis  ECHO done in 2020 was stable from 2019 and continues to show moderate AS  Managed by cardiology  Benign essential hypertension  Blood pressure well controlled  Continue anti-hypertensives  Hyperlipidemia  Lab Results   Component Value Date    LDLCALC 50 2020      LDL is excellent  Continue high intensity statin  Atherosclerotic heart disease of native coronary artery without angina pectoris  Stable without angina  Follows with cardiology  Continue current cardiovascular regimen  Orders Placed This Encounter   Procedures    Lipid panel    CBC    Basic metabolic panel     BMI Counseling: Body mass index is 32 36 kg/m²  The BMI is above normal  Nutrition recommendations include decreasing portion sizes, encouraging healthy choices of fruits and vegetables, limiting drinks that contain sugar, moderation in carbohydrate intake and increasing intake of lean protein  Exercise recommendations include exercising 3-5 times per week  Falls Plan of Care: balance, strength, and gait training instructions were provided  Return in about 26 weeks (around 2021) for Subsequent AWV  Chief Complaint:     Chief Complaint   Patient presents with    Follow-up     6 month      History of Present Illness:     Patient presents for routine follow-up  Denies any changes with health since last seen 6 months ago  No recent falls  Go first dose of COVID-19 vaccine  General aches and pains and some gait instability at times  He had a repeat ECHO in 2020 and it was stable showing moderate aortic stenosis  Follows regularly with cardiology  Due for updated labs  Review of Systems:     Review of Systems   Constitutional: Negative for activity change, appetite change and fatigue     Respiratory: Negative for apnea, cough, chest tightness, shortness of breath and wheezing  Cardiovascular: Negative for chest pain, palpitations and leg swelling  Gastrointestinal: Negative for abdominal distention, abdominal pain, blood in stool, constipation, diarrhea, nausea and vomiting  Musculoskeletal: Positive for arthralgias and gait problem  Negative for back pain, joint swelling and myalgias  Neurological: Negative for dizziness, weakness, light-headedness, numbness and headaches  Psychiatric/Behavioral: Negative for behavioral problems, confusion, hallucinations, sleep disturbance and suicidal ideas  The patient is not nervous/anxious  Objective:     /62 (BP Location: Left arm, Patient Position: Sitting, Cuff Size: Standard)   Pulse 62   Temp 98 1 °F (36 7 °C) (Temporal) Comment: w/ aspirin  Wt 108 kg (238 lb 9 6 oz) Comment: w/ shoes denied off  SpO2 98%   BMI 32 36 kg/m²     Physical Exam  Vitals signs reviewed  Constitutional:       General: He is not in acute distress  Appearance: He is well-developed  He is obese  He is not diaphoretic  Neck:      Musculoskeletal: Neck supple  Thyroid: No thyromegaly  Vascular: No JVD  Cardiovascular:      Rate and Rhythm: Normal rate and regular rhythm  Heart sounds: Murmur present  Pulmonary:      Effort: Pulmonary effort is normal  No respiratory distress  Breath sounds: Normal breath sounds  No wheezing or rales  Abdominal:      General: Bowel sounds are normal  There is no distension  Palpations: Abdomen is soft  Tenderness: There is no abdominal tenderness  Musculoskeletal:      Right lower leg: Edema (trace) present  Left lower leg: Edema (trace) present  Lymphadenopathy:      Cervical: No cervical adenopathy  Skin:     General: Skin is warm and dry  Neurological:      Mental Status: He is alert     Psychiatric:         Mood and Affect: Mood normal          Behavior: Behavior normal  Mina Sarabia DO  [facility]

## 2021-03-09 ENCOUNTER — IMMUNIZATIONS (OUTPATIENT)
Dept: FAMILY MEDICINE CLINIC | Facility: HOSPITAL | Age: 76
End: 2021-03-09

## 2021-03-09 DIAGNOSIS — Z23 ENCOUNTER FOR IMMUNIZATION: Primary | ICD-10-CM

## 2021-03-09 PROCEDURE — 91301 SARS-COV-2 / COVID-19 MRNA VACCINE (MODERNA) 100 MCG: CPT

## 2021-03-09 PROCEDURE — 0012A SARS-COV-2 / COVID-19 MRNA VACCINE (MODERNA) 100 MCG: CPT

## 2021-03-22 DIAGNOSIS — I10 BENIGN ESSENTIAL HYPERTENSION: ICD-10-CM

## 2021-03-22 RX ORDER — CARVEDILOL 6.25 MG/1
6.25 TABLET ORAL 2 TIMES DAILY
Qty: 180 TABLET | Refills: 1 | Status: SHIPPED | OUTPATIENT
Start: 2021-03-22 | End: 2021-08-19 | Stop reason: CLARIF

## 2021-04-07 LAB
BASOPHILS # BLD AUTO: 0 X10E3/UL (ref 0–0.2)
BASOPHILS NFR BLD AUTO: 0 %
BUN SERPL-MCNC: 24 MG/DL (ref 8–27)
BUN/CREAT SERPL: 22 (ref 10–24)
CALCIUM SERPL-MCNC: 9.6 MG/DL (ref 8.6–10.2)
CHLORIDE SERPL-SCNC: 101 MMOL/L (ref 96–106)
CHOLEST SERPL-MCNC: 122 MG/DL (ref 100–199)
CO2 SERPL-SCNC: 26 MMOL/L (ref 20–29)
CREAT SERPL-MCNC: 1.07 MG/DL (ref 0.76–1.27)
EOSINOPHIL # BLD AUTO: 0.1 X10E3/UL (ref 0–0.4)
EOSINOPHIL NFR BLD AUTO: 2 %
ERYTHROCYTE [DISTWIDTH] IN BLOOD BY AUTOMATED COUNT: 12.2 % (ref 11.6–15.4)
GLUCOSE SERPL-MCNC: 118 MG/DL (ref 65–99)
HCT VFR BLD AUTO: 46.1 % (ref 37.5–51)
HDLC SERPL-MCNC: 38 MG/DL
HGB BLD-MCNC: 15.7 G/DL (ref 13–17.7)
IMM GRANULOCYTES # BLD: 0 X10E3/UL (ref 0–0.1)
IMM GRANULOCYTES NFR BLD: 0 %
LDLC SERPL CALC-MCNC: 61 MG/DL (ref 0–99)
LYMPHOCYTES # BLD AUTO: 1.6 X10E3/UL (ref 0.7–3.1)
LYMPHOCYTES NFR BLD AUTO: 24 %
MCH RBC QN AUTO: 32.6 PG (ref 26.6–33)
MCHC RBC AUTO-ENTMCNC: 34.1 G/DL (ref 31.5–35.7)
MCV RBC AUTO: 96 FL (ref 79–97)
MONOCYTES # BLD AUTO: 0.7 X10E3/UL (ref 0.1–0.9)
MONOCYTES NFR BLD AUTO: 11 %
NEUTROPHILS # BLD AUTO: 4.3 X10E3/UL (ref 1.4–7)
NEUTROPHILS NFR BLD AUTO: 63 %
PLATELET # BLD AUTO: 215 X10E3/UL (ref 150–450)
POTASSIUM SERPL-SCNC: 4 MMOL/L (ref 3.5–5.2)
RBC # BLD AUTO: 4.81 X10E6/UL (ref 4.14–5.8)
SL AMB EGFR AFRICAN AMERICAN: 77 ML/MIN/1.73
SL AMB EGFR NON AFRICAN AMERICAN: 67 ML/MIN/1.73
SL AMB VLDL CHOLESTEROL CALC: 23 MG/DL (ref 5–40)
SODIUM SERPL-SCNC: 141 MMOL/L (ref 134–144)
TRIGL SERPL-MCNC: 127 MG/DL (ref 0–149)
WBC # BLD AUTO: 6.8 X10E3/UL (ref 3.4–10.8)

## 2021-04-08 ENCOUNTER — OFFICE VISIT (OUTPATIENT)
Dept: INTERNAL MEDICINE CLINIC | Facility: CLINIC | Age: 76
End: 2021-04-08
Payer: COMMERCIAL

## 2021-04-08 VITALS
TEMPERATURE: 97.4 F | HEART RATE: 67 BPM | WEIGHT: 236.4 LBS | OXYGEN SATURATION: 98 % | SYSTOLIC BLOOD PRESSURE: 126 MMHG | BODY MASS INDEX: 32.06 KG/M2 | DIASTOLIC BLOOD PRESSURE: 82 MMHG

## 2021-04-08 DIAGNOSIS — M54.12 CERVICAL RADICULOPATHY: Primary | ICD-10-CM

## 2021-04-08 PROCEDURE — 3074F SYST BP LT 130 MM HG: CPT | Performed by: INTERNAL MEDICINE

## 2021-04-08 PROCEDURE — 1036F TOBACCO NON-USER: CPT | Performed by: INTERNAL MEDICINE

## 2021-04-08 PROCEDURE — 99214 OFFICE O/P EST MOD 30 MIN: CPT | Performed by: INTERNAL MEDICINE

## 2021-04-08 PROCEDURE — 3079F DIAST BP 80-89 MM HG: CPT | Performed by: INTERNAL MEDICINE

## 2021-04-08 PROCEDURE — 1160F RVW MEDS BY RX/DR IN RCRD: CPT | Performed by: INTERNAL MEDICINE

## 2021-04-08 RX ORDER — TIZANIDINE 4 MG/1
4 TABLET ORAL EVERY 8 HOURS PRN
Qty: 60 TABLET | Refills: 3 | Status: SHIPPED | OUTPATIENT
Start: 2021-04-08 | End: 2021-08-19 | Stop reason: ALTCHOICE

## 2021-04-08 NOTE — PROGRESS NOTES
[facility] [address] [place] [address]    NAME: Fuentes Winston  AGE: 66 y o  SEX: male  : 1945     DATE: 2021     Assessment and Plan:     1  Cervical radiculopathy    Presentation is consistent with radiculopathy  Discussed use of tylenol/NSAIDs prn and heat/massage prn  Will add on muscle relaxer  If not improving, would recommend course of PT     - tiZANidine (ZANAFLEX) 4 mg tablet; Take 1 tablet (4 mg total) by mouth every 8 (eight) hours as needed for muscle spasms  Dispense: 60 tablet; Refill: 3     Chief Complaint:     Chief Complaint   Patient presents with    Back Pain    Shoulder Pain      History of Present Illness:     Increased pain x 2-3 weeks in upper back/sacpular area, shoulder, and neck on the left  Has radiation of pain down the left arm  Can happen when he is sitting there doing nothing then has to find a comfortable position or switch positions  No chest pain, chest tightness, trouble breathing  No recent fall or trauma  Has been taking tylenol/aleve with minimal relief  Review of Systems:     Review of Systems   Constitutional: Negative  Respiratory: Negative  Cardiovascular: Negative  Musculoskeletal: Positive for arthralgias, back pain, neck pain and neck stiffness  Neurological: Positive for numbness  Negative for weakness  Objective:     /82 (BP Location: Left arm, Patient Position: Sitting, Cuff Size: Standard)   Pulse 67   Temp (!) 97 4 °F (36 3 °C) (Temporal) Comment: NO NSAIDS  Wt 107 kg (236 lb 6 4 oz) Comment: w/ shoes denied off  SpO2 98%   BMI 32 06 kg/m²     Physical Exam  Constitutional:       General: He is not in acute distress  Appearance: He is obese  He is not ill-appearing  Cardiovascular:      Rate and Rhythm: Normal rate and regular rhythm  Pulmonary:      Effort: Pulmonary effort is normal  No respiratory distress  Breath sounds: No wheezing     Musculoskeletal:         General: Deformity (cervical paraspinal hypertonicity on left along with spasm of trapezius) present  No swelling  Neurological:      Mental Status: He is alert  Sensory: No sensory deficit  Motor: No weakness           Mark Flores DO  [facility]

## 2021-04-08 NOTE — PATIENT INSTRUCTIONS
Cervical Radiculopathy   WHAT YOU NEED TO KNOW:   Cervical radiculopathy is a painful condition that happens when a spinal nerve in your neck is pinched or irritated  DISCHARGE INSTRUCTIONS:   Medicines: You may need any of the following:  · NSAIDs  help decrease swelling and pain  This medicine can be bought without a doctor's order  This medicine can cause stomach bleeding or kidney problems in certain people  If you take blood thinner medicine, always ask your healthcare provider if NSAIDs are safe for you  Always read the medicine label and follow the directions on it before using this medicine  · Prescription pain medicine  helps decrease pain  Do not wait until the pain is severe before you take this medicine  · Steroids  help decrease pain and swelling  These may be given as a pill or as an injection in your neck  You may need more than 1 injection if your symptoms do not improve after the first treatment  · Take your medicine as directed  Contact your healthcare provider if you think your medicine is not helping or if you have side effects  Tell him of her if you are allergic to any medicine  Keep a list of the medicines, vitamins, and herbs you take  Include the amounts, and when and why you take them  Bring the list or the pill bottles to follow-up visits  Carry your medicine list with you in case of an emergency  Follow up with your healthcare provider or spine specialist as directed:  Write down your questions so you remember to ask them during your visits  Physical therapy:  Your healthcare provider may suggest physical therapy to stretch and strengthen your muscles  Your physical therapist can teach you how to improve your posture and the way you hold your neck  He may also teach you how to be safely active and avoid further injury  He can also help you develop an exercise program that is safe for your back and neck  Self-care:   · Ice  helps decrease swelling and pain   Ice may also help prevent tissue damage  Use an ice pack, or put crushed ice in a plastic bag  Cover it with a towel and place it on your neck for 15 to 20 minutes every hour or as directed  · Rest  when you feel it is needed  Slowly start to do more each day  Return to your daily activities as directed  · Wear a soft collar  You may be given a soft collar to support your neck while you sleep  Wear the soft collar only as directed  · Do light stretches and regular exercise  Your healthcare provider may suggest light stretches to help decrease stiffness in your neck and arm as you recover  After your pain is controlled, you may benefit from regular exercise  Ask what type of exercise is safe for your back and neck  · Review your work area  A comfortable work area can help prevent neck strain  Ask your employer for an ergonomic review to check the position of your desk, chair, phone, and computer  Make any necessary adjustments for your comfort  Contact your healthcare provider or spine specialist if:   · You have a fever  · You are losing weight without trying  · Your pain is worse, even with medicine  · One or both hands feel more numb than before, or you cannot move your fingers well  · You have questions or concerns about your condition or care  © Copyright [address] Information is for End User's use only and may not be sold, redistributed or otherwise used for commercial purposes  All illustrations and images included in CareNotes® are the copyrighted property of [employer]  or [facility] Bobby Mccann   The above information is an  only  It is not intended as medical advice for individual conditions or treatments  Talk to your doctor, nurse or pharmacist before following any medical regimen to see if it is safe and effective for you

## 2021-08-19 ENCOUNTER — OFFICE VISIT (OUTPATIENT)
Dept: INTERNAL MEDICINE CLINIC | Facility: CLINIC | Age: 76
End: 2021-08-19
Payer: COMMERCIAL

## 2021-08-19 VITALS
HEART RATE: 61 BPM | BODY MASS INDEX: 31.64 KG/M2 | DIASTOLIC BLOOD PRESSURE: 80 MMHG | HEIGHT: 72 IN | SYSTOLIC BLOOD PRESSURE: 124 MMHG | OXYGEN SATURATION: 99 % | WEIGHT: 233.6 LBS

## 2021-08-19 DIAGNOSIS — E78.2 MIXED HYPERLIPIDEMIA: ICD-10-CM

## 2021-08-19 DIAGNOSIS — N13.8 BPH WITH OBSTRUCTION/LOWER URINARY TRACT SYMPTOMS: Chronic | ICD-10-CM

## 2021-08-19 DIAGNOSIS — Z00.00 MEDICARE ANNUAL WELLNESS VISIT, SUBSEQUENT: ICD-10-CM

## 2021-08-19 DIAGNOSIS — I10 BENIGN ESSENTIAL HYPERTENSION: Primary | ICD-10-CM

## 2021-08-19 DIAGNOSIS — F51.02 ADJUSTMENT INSOMNIA: ICD-10-CM

## 2021-08-19 DIAGNOSIS — N40.1 BPH WITH OBSTRUCTION/LOWER URINARY TRACT SYMPTOMS: Chronic | ICD-10-CM

## 2021-08-19 PROCEDURE — 3288F FALL RISK ASSESSMENT DOCD: CPT | Performed by: INTERNAL MEDICINE

## 2021-08-19 PROCEDURE — G0439 PPPS, SUBSEQ VISIT: HCPCS | Performed by: INTERNAL MEDICINE

## 2021-08-19 PROCEDURE — 1036F TOBACCO NON-USER: CPT | Performed by: INTERNAL MEDICINE

## 2021-08-19 PROCEDURE — 1125F AMNT PAIN NOTED PAIN PRSNT: CPT | Performed by: INTERNAL MEDICINE

## 2021-08-19 PROCEDURE — 3725F SCREEN DEPRESSION PERFORMED: CPT | Performed by: INTERNAL MEDICINE

## 2021-08-19 PROCEDURE — 99214 OFFICE O/P EST MOD 30 MIN: CPT | Performed by: INTERNAL MEDICINE

## 2021-08-19 PROCEDURE — 1160F RVW MEDS BY RX/DR IN RCRD: CPT | Performed by: INTERNAL MEDICINE

## 2021-08-19 PROCEDURE — 3074F SYST BP LT 130 MM HG: CPT | Performed by: INTERNAL MEDICINE

## 2021-08-19 PROCEDURE — 3079F DIAST BP 80-89 MM HG: CPT | Performed by: INTERNAL MEDICINE

## 2021-08-19 PROCEDURE — 1170F FXNL STATUS ASSESSED: CPT | Performed by: INTERNAL MEDICINE

## 2021-08-19 RX ORDER — CARVEDILOL 6.25 MG/1
6.25 TABLET ORAL 2 TIMES DAILY
Qty: 180 TABLET | Refills: 3 | Status: SHIPPED | OUTPATIENT
Start: 2021-08-19 | End: 2022-04-13 | Stop reason: HOSPADM

## 2021-08-19 RX ORDER — MIRTAZAPINE 15 MG/1
15 TABLET, FILM COATED ORAL DAILY
Qty: 90 TABLET | Refills: 2 | Status: SHIPPED | OUTPATIENT
Start: 2021-08-19 | End: 2022-01-12 | Stop reason: SDUPTHER

## 2021-08-19 RX ORDER — FINASTERIDE 5 MG/1
5 TABLET, FILM COATED ORAL DAILY
Qty: 90 TABLET | Refills: 2 | Status: SHIPPED | OUTPATIENT
Start: 2021-08-19 | End: 2022-06-22

## 2021-08-19 RX ORDER — AMLODIPINE BESYLATE 5 MG/1
5 TABLET ORAL DAILY
Qty: 90 TABLET | Refills: 2 | Status: SHIPPED | OUTPATIENT
Start: 2021-08-19 | End: 2022-06-22

## 2021-08-19 RX ORDER — ATORVASTATIN CALCIUM 40 MG/1
40 TABLET, FILM COATED ORAL DAILY
Qty: 90 TABLET | Refills: 2 | Status: SHIPPED | OUTPATIENT
Start: 2021-08-19 | End: 2022-06-22

## 2021-08-19 RX ORDER — OLMESARTAN MEDOXOMIL AND HYDROCHLOROTHIAZIDE 40/25 40; 25 MG/1; MG/1
1 TABLET ORAL DAILY
Qty: 90 TABLET | Refills: 2 | Status: SHIPPED | OUTPATIENT
Start: 2021-08-19 | End: 2022-06-22

## 2021-08-19 NOTE — ASSESSMENT & PLAN NOTE
BP is controlled  No changes in medications today  Discussed heart healthy diet and increasing physical activity

## 2021-08-19 NOTE — PROGRESS NOTES
Assessment and Plan:     1  Medicare annual wellness visit, subsequent    BMI Counseling: Body mass index is 31 68 kg/m²  The BMI is above normal  Nutrition recommendations include moderation in carbohydrate intake, increasing intake of lean protein and reducing intake of saturated and trans fat  Exercise recommendations include exercising 3-5 times per week  Preventive health issues were discussed with patient, and age appropriate screening tests were ordered as noted in patient's After Visit Summary  Personalized health advice and appropriate referrals for health education or preventive services given if needed, as noted in patient's After Visit Summary       History of Present Illness:     Patient presents for Medicare Annual Wellness visit    Patient Care Team:  Emilie Navarro DO as PCP - General (Internal Medicine)  Joselyn Moraes MD (Cardiology)  Bethel Hogue MD (Neurology)     Problem List:     Patient Active Problem List   Diagnosis    Allergic rhinitis    Moderate aortic stenosis    Atherosclerotic heart disease of native coronary artery without angina pectoris    Benign essential hypertension    BPH with obstruction/lower urinary tract symptoms    Gait instability    Hyperlipidemia    Insomnia    Male erectile dysfunction    Pulmonary nodule seen on imaging study    History of stroke      Past Medical and Surgical History:     Past Medical History:   Diagnosis Date    Arthritis     Cardiac disorder     Hypertension     Stroke (cerebrum) ([facility] [address] )      Past Surgical History:   Procedure Laterality Date    CORONARY ARTERY BYPASS GRAFT  2004    x2 LIMA-LAD, SVG-RCA    TONSILLECTOMY        Family History:     Family History   Problem Relation Age of Onset    Coronary artery disease Mother     Arthritis Mother     Hypertension Mother     Stroke Mother     Other Father         aortic valve replacement; CABG    Arthritis Father     Hypertension Brother     Stroke Brother Social History:     Social History     Socioeconomic History    Marital status: Single     Spouse name: None    Number of children: None    Years of education: None    Highest education level: None   Occupational History    None   Tobacco Use    Smoking status: Former Smoker     Packs/day: 1 50     Years: 4 00     Pack years: 6 00     Types: Cigarettes     Quit date: 1960     Years since quittin 6    Smokeless tobacco: Never Used    Tobacco comment: former light tobacco smoker   Vaping Use    Vaping Use: Never used   Substance and Sexual Activity    Alcohol use: No    Drug use: No    Sexual activity: Never   Other Topics Concern    None   Social History Narrative    Caffeine use    No advance directives     Social Determinants of Health     Financial Resource Strain:     Difficulty of Paying Living Expenses:    Food Insecurity:     Worried About Running Out of Food in the Last Year:     Ran Out of Food in the Last Year:    Transportation Needs:     Lack of Transportation (Medical):  Lack of Transportation (Non-Medical):    Physical Activity: Insufficiently Active    Days of Exercise per Week: 7 days    Minutes of Exercise per Session: 20 min   Stress: Stress Concern Present    Feeling of Stress :  To some extent   Social Connections:     Frequency of Communication with Friends and Family:     Frequency of Social Gatherings with Friends and Family:     Attends Sikhism Services:     Active Member of Clubs or Organizations:     Attends Club or Organization Meetings:     Marital Status:    Intimate Partner Violence:     Fear of Current or Ex-Partner:     Emotionally Abused:     Physically Abused:     Sexually Abused:       Medications and Allergies:     Current Outpatient Medications   Medication Sig Dispense Refill    amLODIPine (NORVASC) 5 mg tablet Take 1 tablet (5 mg total) by mouth daily 90 tablet 2    aspirin (ASPIRIN LOW DOSE) 81 mg EC tablet Take 1 tablet by mouth daily      atorvastatin (LIPITOR) 40 mg tablet Take 1 tablet (40 mg total) by mouth daily 90 tablet 2    carvedilol (COREG) 6 25 mg tablet Take 1 tablet (6 25 mg total) by mouth 2 (two) times a day 180 tablet 3    Cholecalciferol (VITAMIN D3) 5000 units CAPS Take by mouth daily       clopidogrel (PLAVIX) 75 mg tablet Take 1 tablet (75 mg total) by mouth daily 90 tablet 3    finasteride (PROSCAR) 5 mg tablet Take 1 tablet (5 mg total) by mouth daily 90 tablet 2    mirtazapine (REMERON) 15 mg tablet Take 1 tablet (15 mg total) by mouth daily 90 tablet 2    olmesartan-hydrochlorothiazide (BENICAR HCT) 40-25 MG per tablet Take 1 tablet by mouth daily 90 tablet 2    tiZANidine (ZANAFLEX) 4 mg tablet Take 1 tablet (4 mg total) by mouth every 8 (eight) hours as needed for muscle spasms 60 tablet 3    carvedilol (COREG) 6 25 mg tablet Take 1 tablet (6 25 mg total) by mouth 2 (two) times a day (Patient not taking: Reported on 8/19/2021) 180 tablet 1     No current facility-administered medications for this visit  No Known Allergies   Immunizations:     Immunization History   Administered Date(s) Administered    INFLUENZA 09/19/2014, 10/04/2017, 10/08/2018, 09/09/2020    Influenza, injectable, quadrivalent, preservative free 0 5 mL 10/07/2019    Influenza, seasonal, injectable 01/01/2014, 09/20/2016    Pneumococcal Conjugate 13-Valent 08/09/2019    Pneumococcal Polysaccharide PPV23 10/02/2014    SARS-CoV-2 / COVID-19 mRNA IM (Merlyn Saldaña) 02/09/2021, 03/09/2021    Tdap 1945, 03/21/2016    Zoster 1945      Health Maintenance:         Topic Date Due    Hepatitis C Screening  Never done         Topic Date Due    Influenza Vaccine (1) 09/01/2021      Medicare Health Risk Assessment:     Ht 6' (1 829 m)   Wt 106 kg (233 lb 9 6 oz)   BMI 31 68 kg/m²      Don Nguyễn is here for his Subsequent Wellness visit   Last Medicare Wellness visit information reviewed, patient interviewed and updates made to the record today  Health Risk Assessment:   Patient rates overall health as good  Patient feels that their physical health rating is slightly worse  Patient is dissatisfied with their life  Eyesight was rated as slightly worse  Hearing was rated as slightly worse  Patient feels that their emotional and mental health rating is same  Patients states they are never, rarely angry  Patient states they are often unusually tired/fatigued  Pain experienced in the last 7 days has been none  Patient states that he has experienced no weight loss or gain in last 6 months  Depression Screening:   PHQ-2 Score: 0      Fall Risk Screening: In the past year, patient has experienced: history of falling in past year    Number of falls: 1  Injured during fall?: No    Feels unsteady when standing or walking?: Yes    Worried about falling?: Yes      Home Safety:  Patient does not have trouble with stairs inside or outside of their home  Patient has working smoke alarms and has no working carbon monoxide detector  Home safety hazards include: none  Nutrition:   Current diet is Unhealthy  Medications:   Patient is currently taking over-the-counter supplements  OTC medications include: see medication list  Patient is able to manage medications  Activities of Daily Living (ADLs)/Instrumental Activities of Daily Living (IADLs):   Walk and transfer into and out of bed and chair?: Yes  Dress and groom yourself?: Yes    Bathe or shower yourself?: Yes    Feed yourself?  Yes  Do your laundry/housekeeping?: Yes  Manage your money, pay your bills and track your expenses?: Yes  Make your own meals?: Yes    Do your own shopping?: Yes    Durable Medical Equipment Suppliers  none    Previous Hospitalizations:   Any hospitalizations or ED visits within the last 12 months?: No      Advance Care Planning:   Living will: No    Durable POA for healthcare: No    Advanced directive: No    Five wishes given: Yes      Cognitive Screening: Provider or family/friend/caregiver concerned regarding cognition?: No    PREVENTIVE SCREENINGS      Cardiovascular Screening:    General: Screening Not Indicated and History Lipid Disorder      Diabetes Screening:     General: Screening Current      Colorectal Cancer Screening:     General: Screening Current      Prostate Cancer Screening:    General: Screening Not Indicated      Osteoporosis Screening:    General: Screening Not Indicated      Abdominal Aortic Aneurysm (AAA) Screening:    Risk factors include: tobacco use        General: Screening Not Indicated      Lung Cancer Screening:     General: Screening Not Indicated      Hepatitis C Screening:    General: Screening Not Indicated    Screening, Brief Intervention, and Referral to Treatment (SBIRT)    Screening  Typical number of drinks in a day: 0  Typical number of drinks in a week: 0  Interpretation: Low risk drinking behavior  AUDIT-C Screenin) How often did you have a drink containing alcohol in the past year? never  2) How many drinks did you have on a typical day when you were drinking in the past year? 0  3) How often did you have 6 or more drinks on one occasion in the past year? never    AUDIT-C Score: 0  Interpretation: Score 0-3 (male): Negative screen for alcohol misuse    Single Item Drug Screening:  How often have you used an illegal drug (including marijuana) or a prescription medication for non-medical reasons in the past year? never    Single Item Drug Screen Score: 0  Interpretation: Negative screen for possible drug use disorder    Brief Intervention  Alcohol & drug use screenings were reviewed  No concerns regarding substance use disorder identified  Other Counseling Topics:   Car/seat belt/driving safety, skin self-exam, sunscreen and regular weightbearing exercise         Barrington Nayak,

## 2021-08-19 NOTE — PROGRESS NOTES
[facility] [address] [place] [address]    NAME: Woody Winston  AGE: 66 y o  SEX: male  : 1945     DATE: 2021     Assessment and Plan:     1  Benign essential hypertension  Assessment & Plan:  BP is controlled  No changes in medications today  Discussed heart healthy diet and increasing physical activity  Orders:  -     olmesartan-hydrochlorothiazide (BENICAR HCT) 40-25 MG per tablet; Take 1 tablet by mouth daily  -     carvedilol (COREG) 6 25 mg tablet; Take 1 tablet (6 25 mg total) by mouth 2 (two) times a day  -     amLODIPine (NORVASC) 5 mg tablet; Take 1 tablet (5 mg total) by mouth daily  -     Lipid panel; Future; Expected date: 2021  -     Comprehensive metabolic panel; Future; Expected date: 2021  -     CBC; Future; Expected date: 2021    2  Adjustment insomnia  Assessment & Plan:  Doing well on remeron 15mg  Continue  Orders:  -     mirtazapine (REMERON) 15 mg tablet; Take 1 tablet (15 mg total) by mouth daily    3  BPH with obstruction/lower urinary tract symptoms  Assessment & Plan:  Stable urinary symptoms on finasteride  Orders:  -     finasteride (PROSCAR) 5 mg tablet; Take 1 tablet (5 mg total) by mouth daily    4  Mixed hyperlipidemia  Assessment & Plan:  Cholesterol is controlled  Continue statin  Orders:  -     atorvastatin (LIPITOR) 40 mg tablet; Take 1 tablet (40 mg total) by mouth daily  -     Lipid panel; Future; Expected date: 2021  -     Comprehensive metabolic panel; Future; Expected date: 2021  -     CBC; Future; Expected date: 2021        Return in about 5 months (around 2022) for Follow-up  Chief Complaint:     Chief Complaint   Patient presents with    Medicare Wellness Visit    Follow-up     6 months  History of Present Illness:     Nima Lilly presents for f/u  No changes with his health  Needs med refills   He is always tired and has gait problems since he had stroke in the past  Review of Systems:     Review of Systems   Constitutional: Positive for fatigue  Negative for activity change and appetite change  Respiratory: Negative for apnea, cough, chest tightness, shortness of breath and wheezing  Cardiovascular: Negative for chest pain, palpitations and leg swelling  Gastrointestinal: Negative for abdominal distention, abdominal pain, blood in stool, constipation, diarrhea, nausea and vomiting  Musculoskeletal: Positive for gait problem  Neurological: Positive for dizziness (intermittent)  Negative for weakness, numbness and headaches  Psychiatric/Behavioral: Negative for behavioral problems, confusion, hallucinations, sleep disturbance and suicidal ideas  The patient is not nervous/anxious  Objective:     /80 (BP Location: Left arm, Patient Position: Sitting, Cuff Size: Standard)   Pulse 61   Ht 6' (1 829 m)   Wt 106 kg (233 lb 9 6 oz)   SpO2 99%   BMI 31 68 kg/m²     Physical Exam  Constitutional:       General: He is not in acute distress  Appearance: He is obese  He is not ill-appearing  Cardiovascular:      Rate and Rhythm: Normal rate and regular rhythm  Heart sounds: No murmur heard  Pulmonary:      Effort: Pulmonary effort is normal  No respiratory distress  Breath sounds: No wheezing  Abdominal:      General: Bowel sounds are normal  There is no distension  Tenderness: There is no abdominal tenderness  Musculoskeletal:      Right lower leg: No edema  Left lower leg: No edema  Neurological:      Mental Status: He is alert  Mental status is at baseline        Gait: Gait abnormal        Emilie Navarro DO  MEDICAL [id number]

## 2021-08-19 NOTE — PATIENT INSTRUCTIONS
Medicare Preventive Visit Patient Instructions  Thank you for completing your Welcome to Medicare Visit or Medicare Annual Wellness Visit today  Your next wellness visit will be due in one year (8/20/2022)  The screening/preventive services that you may require over the next 5-10 years are detailed below  Some tests may not apply to you based off risk factors and/or age  Screening tests ordered at today's visit but not completed yet may show as past due  Also, please note that scanned in results may not display below  Preventive Screenings:  Service Recommendations Previous Testing/Comments   Colorectal Cancer Screening  · Colonoscopy    · Fecal Occult Blood Test (FOBT)/Fecal Immunochemical Test (FIT)  · Fecal DNA/Cologuard Test  · Flexible Sigmoidoscopy Age: 54-65 years old   Colonoscopy: every 10 years (May be performed more frequently if at higher risk)  OR  FOBT/FIT: every 1 year  OR  Cologuard: every 3 years  OR  Sigmoidoscopy: every 5 years  Screening may be recommended earlier than age 48 if at higher risk for colorectal cancer  Also, an individualized decision between you and your healthcare provider will decide whether screening between the ages of 74-80 would be appropriate   Colonoscopy: Not on file  FOBT/FIT: Not on file  Cologuard: 07/15/2019  Sigmoidoscopy: Not on file    Screening Current     Prostate Cancer Screening Individualized decision between patient and health care provider in men between ages of 53-78   Medicare will cover every 12 months beginning on the day after your 50th birthday PSA: 0 2 ng/mL     Screening Not Indicated     Hepatitis C Screening Once for adults born between 1945 and 1965  More frequently in patients at high risk for Hepatitis C Hep C Antibody: Not on file    Screening Not Indicated   Diabetes Screening 1-2 times per year if you're at risk for diabetes or have pre-diabetes Fasting glucose: No results in last 5 years   A1C: 5 1 %    Screening Current   Cholesterol Screening Once every 5 years if you don't have a lipid disorder  May order more often based on risk factors  Lipid panel: 04/06/2021    Screening Not Indicated  History Lipid Disorder      Other Preventive Screenings Covered by Medicare:  1  Abdominal Aortic Aneurysm (AAA) Screening: covered once if your at risk  You're considered to be at risk if you have a family history of AAA or a male between the age of 73-68 who smoking at least 100 cigarettes in your lifetime  2  Lung Cancer Screening: covers low dose CT scan once per year if you meet all of the following conditions: (1) Age 50-69; (2) No signs or symptoms of lung cancer; (3) Current smoker or have quit smoking within the last 15 years; (4) You have a tobacco smoking history of at least 30 pack years (packs per day x number of years you smoked); (5) You get a written order from a healthcare provider  3  Glaucoma Screening: covered annually if you're considered high risk: (1) You have diabetes OR (2) Family history of glaucoma OR (3)  aged 48 and older OR (3)  American aged 72 and older  3  Osteoporosis Screening: covered every 2 years if you meet one of the following conditions: (1) Have a vertebral abnormality; (2) On glucocorticoid therapy for more than 3 months; (3) Have primary hyperparathyroidism; (4) On osteoporosis medications and need to assess response to drug therapy  5  HIV Screening: covered annually if you're between the age of 12-76  Also covered annually if you are younger than 13 and older than 72 with risk factors for HIV infection  For pregnant patients, it is covered up to 3 times per pregnancy      Immunizations:  Immunization Recommendations   Influenza Vaccine Annual influenza vaccination during flu season is recommended for all persons aged >= 6 months who do not have contraindications   Pneumococcal Vaccine (Prevnar and Pneumovax)  * Prevnar = PCV13  * Pneumovax = PPSV23 Adults 25-60 years old: 1-3 doses may be recommended based on certain risk factors  Adults 72 years old: Prevnar (PCV13) vaccine recommended followed by Pneumovax (PPSV23) vaccine  If already received PPSV23 since turning 65, then PCV13 recommended at least one year after PPSV23 dose  Hepatitis B Vaccine 3 dose series if at intermediate or high risk (ex: diabetes, end stage renal disease, liver disease)   Tetanus (Td) Vaccine - COST NOT COVERED BY MEDICARE PART B Following completion of primary series, a booster dose should be given every 10 years to maintain immunity against tetanus  Td may also be given as tetanus wound prophylaxis  Tdap Vaccine - COST NOT COVERED BY MEDICARE PART B Recommended at least once for all adults  For pregnant patients, recommended with each pregnancy  Shingles Vaccine (Shingrix) - COST NOT COVERED BY MEDICARE PART B  2 shot series recommended in those aged 48 and above     Health Maintenance Due:      Topic Date Due    Hepatitis C Screening  Never done     Immunizations Due:      Topic Date Due    Influenza Vaccine (1) 09/01/2021     Advance Directives   What are advance directives? Advance directives are legal documents that state your wishes and plans for medical care  These plans are made ahead of time in case you lose your ability to make decisions for yourself  Advance directives can apply to any medical decision, such as the treatments you want, and if you want to donate organs  What are the types of advance directives? There are many types of advance directives, and each state has rules about how to use them  You may choose a combination of any of the following:  · Living will: This is a written record of the treatment you want  You can also choose which treatments you do not want, which to limit, and which to stop at a certain time  This includes surgery, medicine, IV fluid, and tube feedings  · Durable power of  for healthcare [facility]):   This is a written record that states who you want to make healthcare choices for you when you are unable to make them for yourself  This person, called a proxy, is usually a family member or a friend  You may choose more than 1 proxy  · Do not resuscitate (DNR) order:  A DNR order is used in case your heart stops beating or you stop breathing  It is a request not to have certain forms of treatment, such as CPR  A DNR order may be included in other types of advance directives  · Medical directive: This covers the care that you want if you are in a coma, near death, or unable to make decisions for yourself  You can list the treatments you want for each condition  Treatment may include pain medicine, surgery, blood transfusions, dialysis, IV or tube feedings, and a ventilator (breathing machine)  · Values history: This document has questions about your views, beliefs, and how you feel and think about life  This information can help others choose the care that you would choose  Why are advance directives important? An advance directive helps you control your care  Although spoken wishes may be used, it is better to have your wishes written down  Spoken wishes can be misunderstood, or not followed  Treatments may be given even if you do not want them  An advance directive may make it easier for your family to make difficult choices about your care  Fall Prevention    Fall prevention  includes ways to make your home and other areas safer  It also includes ways you can move more carefully to prevent a fall  Health conditions that cause changes in your blood pressure, vision, or muscle strength and coordination may increase your risk for falls  Medicines may also increase your risk for falls if they make you dizzy, weak, or sleepy  Fall prevention tips:   · Stand or sit up slowly  · Use assistive devices as directed  · Wear shoes that fit well and have soles that   · Wear a personal alarm  · Stay active  · Manage your medical conditions      Home Safety Tips:  · Add items to prevent falls in the bathroom  · Keep paths clear  · Install bright lights in your home  · Keep items you use often on shelves within reach  · Paint or place reflective tape on the edges of your stairs  Weight Management   Why it is important to manage your weight:  Being overweight increases your risk of health conditions such as heart disease, high blood pressure, type 2 diabetes, and certain types of cancer  It can also increase your risk for osteoarthritis, sleep apnea, and other respiratory problems  Aim for a slow, steady weight loss  Even a small amount of weight loss can lower your risk of health problems  How to lose weight safely:  A safe and healthy way to lose weight is to eat fewer calories and get regular exercise  You can lose up about 1 pound a week by decreasing the number of calories you eat by 500 calories each day  Healthy meal plan for weight management:  A healthy meal plan includes a variety of foods, contains fewer calories, and helps you stay healthy  A healthy meal plan includes the following:  · Eat whole-grain foods more often  A healthy meal plan should contain fiber  Fiber is the part of grains, fruits, and vegetables that is not broken down by your body  Whole-grain foods are healthy and provide extra fiber in your diet  Some examples of whole-grain foods are whole-wheat breads and pastas, oatmeal, brown rice, and bulgur  · Eat a variety of vegetables every day  Include dark, leafy greens such as spinach, kale, matt greens, and mustard greens  Eat yellow and orange vegetables such as carrots, sweet potatoes, and winter squash  · Eat a variety of fruits every day  Choose fresh or canned fruit (canned in its own juice or light syrup) instead of juice  Fruit juice has very little or no fiber  · Eat low-fat dairy foods  Drink fat-free (skim) milk or 1% milk  Eat fat-free yogurt and low-fat cottage cheese   Try low-fat cheeses such as mozzarella and other reduced-fat cheeses  · Choose meat and other protein foods that are low in fat  Choose beans or other legumes such as split peas or lentils  Choose fish, skinless poultry (chicken or turkey), or lean cuts of red meat (beef or pork)  Before you cook meat or poultry, cut off any visible fat  · Use less fat and oil  Try baking foods instead of frying them  Add less fat, such as margarine, sour cream, regular salad dressing and mayonnaise to foods  Eat fewer high-fat foods  Some examples of high-fat foods include french fries, doughnuts, ice cream, and cakes  · Eat fewer sweets  Limit foods and drinks that are high in sugar  This includes candy, cookies, regular soda, and sweetened drinks  Exercise:  Exercise at least 30 minutes per day on most days of the week  Some examples of exercise include walking, biking, dancing, and swimming  You can also fit in more physical activity by taking the stairs instead of the elevator or parking farther away from stores  Ask your healthcare provider about the best exercise plan for you  © Copyright [employer] 2018 Information is for End User's use only and may not be sold, redistributed or otherwise used for commercial purposes   All illustrations and images included in CareNotes® are the copyrighted property of A D A M , Inc  or [address] [place] [address] [employer]

## 2021-11-17 ENCOUNTER — IMMUNIZATIONS (OUTPATIENT)
Dept: FAMILY MEDICINE CLINIC | Facility: HOSPITAL | Age: 76
End: 2021-11-17

## 2021-11-17 DIAGNOSIS — Z23 ENCOUNTER FOR IMMUNIZATION: Primary | ICD-10-CM

## 2021-11-17 PROCEDURE — 0064A COVID-19 MODERNA VACC 0.25 ML BOOSTER: CPT

## 2021-11-17 PROCEDURE — 91306 COVID-19 MODERNA VACC 0.25 ML BOOSTER: CPT

## 2021-11-30 DIAGNOSIS — I25.10 CORONARY ARTERY DISEASE: ICD-10-CM

## 2021-11-30 RX ORDER — CLOPIDOGREL BISULFATE 75 MG/1
75 TABLET ORAL DAILY
Qty: 90 TABLET | Refills: 3 | Status: SHIPPED | OUTPATIENT
Start: 2021-11-30 | End: 2022-05-27

## 2021-12-04 LAB
ALBUMIN SERPL-MCNC: 4.4 G/DL (ref 3.7–4.7)
ALBUMIN/GLOB SERPL: 1.7 {RATIO} (ref 1.2–2.2)
ALP SERPL-CCNC: 100 IU/L (ref 44–121)
ALT SERPL-CCNC: 38 IU/L (ref 0–44)
AST SERPL-CCNC: 32 IU/L (ref 0–40)
BASOPHILS # BLD AUTO: 0 X10E3/UL (ref 0–0.2)
BASOPHILS NFR BLD AUTO: 0 %
BILIRUB SERPL-MCNC: 1.5 MG/DL (ref 0–1.2)
BUN SERPL-MCNC: 22 MG/DL (ref 8–27)
BUN/CREAT SERPL: 24 (ref 10–24)
CALCIUM SERPL-MCNC: 9.4 MG/DL (ref 8.6–10.2)
CHLORIDE SERPL-SCNC: 101 MMOL/L (ref 96–106)
CHOLEST SERPL-MCNC: 128 MG/DL (ref 100–199)
CO2 SERPL-SCNC: 27 MMOL/L (ref 20–29)
CREAT SERPL-MCNC: 0.91 MG/DL (ref 0.76–1.27)
EOSINOPHIL # BLD AUTO: 0.1 X10E3/UL (ref 0–0.4)
EOSINOPHIL NFR BLD AUTO: 1 %
ERYTHROCYTE [DISTWIDTH] IN BLOOD BY AUTOMATED COUNT: 12.1 % (ref 11.6–15.4)
GLOBULIN SER-MCNC: 2.6 G/DL (ref 1.5–4.5)
GLUCOSE SERPL-MCNC: 108 MG/DL (ref 65–99)
HCT VFR BLD AUTO: 43.2 % (ref 37.5–51)
HDLC SERPL-MCNC: 34 MG/DL
HGB BLD-MCNC: 15.3 G/DL (ref 13–17.7)
IMM GRANULOCYTES # BLD: 0 X10E3/UL (ref 0–0.1)
IMM GRANULOCYTES NFR BLD: 0 %
LDLC SERPL CALC-MCNC: 69 MG/DL (ref 0–99)
LYMPHOCYTES # BLD AUTO: 1.6 X10E3/UL (ref 0.7–3.1)
LYMPHOCYTES NFR BLD AUTO: 20 %
MCH RBC QN AUTO: 32.7 PG (ref 26.6–33)
MCHC RBC AUTO-ENTMCNC: 35.4 G/DL (ref 31.5–35.7)
MCV RBC AUTO: 92 FL (ref 79–97)
MONOCYTES # BLD AUTO: 0.7 X10E3/UL (ref 0.1–0.9)
MONOCYTES NFR BLD AUTO: 9 %
NEUTROPHILS # BLD AUTO: 5.4 X10E3/UL (ref 1.4–7)
NEUTROPHILS NFR BLD AUTO: 70 %
PLATELET # BLD AUTO: 226 X10E3/UL (ref 150–450)
POTASSIUM SERPL-SCNC: 3.7 MMOL/L (ref 3.5–5.2)
PROT SERPL-MCNC: 7 G/DL (ref 6–8.5)
RBC # BLD AUTO: 4.68 X10E6/UL (ref 4.14–5.8)
SL AMB EGFR AFRICAN AMERICAN: 93 ML/MIN/1.73
SL AMB EGFR NON AFRICAN AMERICAN: 80 ML/MIN/1.73
SL AMB VLDL CHOLESTEROL CALC: 25 MG/DL (ref 5–40)
SODIUM SERPL-SCNC: 140 MMOL/L (ref 134–144)
TRIGL SERPL-MCNC: 145 MG/DL (ref 0–149)
WBC # BLD AUTO: 7.8 X10E3/UL (ref 3.4–10.8)

## 2022-01-12 ENCOUNTER — OFFICE VISIT (OUTPATIENT)
Dept: CARDIOLOGY CLINIC | Facility: CLINIC | Age: 77
End: 2022-01-12
Payer: COMMERCIAL

## 2022-01-12 VITALS
HEIGHT: 72 IN | SYSTOLIC BLOOD PRESSURE: 134 MMHG | DIASTOLIC BLOOD PRESSURE: 82 MMHG | WEIGHT: 230.4 LBS | BODY MASS INDEX: 31.21 KG/M2 | OXYGEN SATURATION: 97 % | HEART RATE: 63 BPM

## 2022-01-12 DIAGNOSIS — I20.8 ANGINAL EQUIVALENT (HCC): ICD-10-CM

## 2022-01-12 DIAGNOSIS — I25.10 ATHEROSCLEROSIS OF NATIVE CORONARY ARTERY OF NATIVE HEART WITHOUT ANGINA PECTORIS: ICD-10-CM

## 2022-01-12 DIAGNOSIS — R06.02 SHORTNESS OF BREATH: ICD-10-CM

## 2022-01-12 DIAGNOSIS — F51.02 ADJUSTMENT INSOMNIA: ICD-10-CM

## 2022-01-12 DIAGNOSIS — I35.9 AORTIC VALVE DISORDER: Primary | ICD-10-CM

## 2022-01-12 PROCEDURE — 99214 OFFICE O/P EST MOD 30 MIN: CPT | Performed by: INTERNAL MEDICINE

## 2022-01-12 PROCEDURE — 3075F SYST BP GE 130 - 139MM HG: CPT | Performed by: INTERNAL MEDICINE

## 2022-01-12 PROCEDURE — 3079F DIAST BP 80-89 MM HG: CPT | Performed by: INTERNAL MEDICINE

## 2022-01-12 RX ORDER — MIRTAZAPINE 7.5 MG/1
7.5 TABLET, FILM COATED ORAL DAILY
Start: 2022-01-12 | End: 2022-01-20 | Stop reason: CLARIF

## 2022-01-12 NOTE — PROGRESS NOTES
[facility]  [address] [place] [id number]  Cardiology Follow Up    Samira Zepeda [facility]  1945  [id number]      1  Aortic valve disorder  Echo complete w/ contrast if indicated   2  Adjustment insomnia  mirtazapine (REMERON) 7 5 MG tablet   3  Atherosclerosis of native coronary artery of native heart without angina pectoris  NM myocardial perfusion spect (rx stress and/or rest)   4  Shortness of breath  NM myocardial perfusion spect (rx stress and/or rest)   5  Anginal equivalent ([address] )  NM myocardial perfusion spect (rx stress and/or rest)       Chief Complaint   Patient presents with    Follow-up     Routine follow up       Interval History:  Patient presents for follow-up visit  Patient does have history of moderate aortic stenosis as well as coronary artery disease in the past   Patient does have fatigue and shortness of breath with exertion  Patient was in [place] last winter  Patient has not had any further cardiac evaluation  Patient's last echocardiogram was end of 2020  Patient some days has no stamina  Patient denies any history of palpitations or dizziness  No history of presyncope syncope  He states that he has been compliant with all his present medications        Patient Active Problem List   Diagnosis    Allergic rhinitis    Moderate aortic stenosis    Atherosclerotic heart disease of native coronary artery without angina pectoris    Benign essential hypertension    BPH with obstruction/lower urinary tract symptoms    Gait instability    Hyperlipidemia    Insomnia    Male erectile dysfunction    Pulmonary nodule seen on imaging study    History of stroke     Past Medical History:   Diagnosis Date    Arthritis     Cardiac disorder     Hypertension     Stroke (cerebrum) ([address] )      Social History     Socioeconomic History    Marital status: Single     Spouse name: Not on file    Number of children: Not on file    Years of education: Not on file    Highest education level: Not on file   Occupational History    Not on file   Tobacco Use    Smoking status: Former Smoker     Packs/day: 1 50     Years: 4 00     Pack years: 6 00     Types: Cigarettes     Quit date: 56     Years since quittin 0    Smokeless tobacco: Never Used    Tobacco comment: former light tobacco smoker   Vaping Use    Vaping Use: Never used   Substance and Sexual Activity    Alcohol use: No    Drug use: No    Sexual activity: Never   Other Topics Concern    Not on file   Social History Narrative    Caffeine use    No advance directives     Social Determinants of Health     Financial Resource Strain: Not on file   Food Insecurity: Not on file   Transportation Needs: Not on file   Physical Activity: Insufficiently Active    Days of Exercise per Week: 7 days    Minutes of Exercise per Session: 20 min   Stress: Stress Concern Present    Feeling of Stress :  To some extent   Social Connections: Not on file   Intimate Partner Violence: Not on file   Housing Stability: Not on file      Family History   Problem Relation Age of Onset    Coronary artery disease Mother     Arthritis Mother     Hypertension Mother     Stroke Mother     Other Father         aortic valve replacement; CABG    Arthritis Father     Hypertension Brother     Stroke Brother      Past Surgical History:   Procedure Laterality Date    CORONARY ARTERY BYPASS GRAFT  2004    x2 LIMA-LAD, SVG-RCA    TONSILLECTOMY         Current Outpatient Medications:     amLODIPine (NORVASC) 5 mg tablet, Take 1 tablet (5 mg total) by mouth daily, Disp: 90 tablet, Rfl: 2    aspirin (ASPIRIN LOW DOSE) 81 mg EC tablet, Take 1 tablet by mouth daily, Disp: , Rfl:     atorvastatin (LIPITOR) 40 mg tablet, Take 1 tablet (40 mg total) by mouth daily, Disp: 90 tablet, Rfl: 2    carvedilol (COREG) 6 25 mg tablet, Take 1 tablet (6 25 mg total) by mouth 2 (two) times a day, Disp: 180 tablet, Rfl: 3    Cholecalciferol (VITAMIN D3) 5000 units CAPS, Take by mouth daily , Disp: , Rfl:     clopidogrel (PLAVIX) 75 mg tablet, Take 1 tablet (75 mg total) by mouth daily, Disp: 90 tablet, Rfl: 3    finasteride (PROSCAR) 5 mg tablet, Take 1 tablet (5 mg total) by mouth daily, Disp: 90 tablet, Rfl: 2    mirtazapine (REMERON) 7 5 MG tablet, Take 1 tablet (7 5 mg total) by mouth daily, Disp: , Rfl:     olmesartan-hydrochlorothiazide (BENICAR HCT) 40-25 MG per tablet, Take 1 tablet by mouth daily, Disp: 90 tablet, Rfl: 2  No Known Allergies    Labs:  Orders Only on 12/03/2021   Component Date Value    White Blood Cell Count 12/03/2021 7 8     Red Blood Cell Count 12/03/2021 4 68     Hemoglobin 12/03/2021 15 3     HCT 12/03/2021 43 2     MCV 12/03/2021 92     MCH 12/03/2021 32 7     MCHC 12/03/2021 35 4     RDW 12/03/2021 12 1     Platelet Count 64/30/4835 226     Neutrophils 12/03/2021 70     Lymphocytes 12/03/2021 20     Monocytes 12/03/2021 9     Eosinophils 12/03/2021 1     Basophils PCT 12/03/2021 0     Neutrophils (Absolute) 12/03/2021 5 4     Lymphocytes (Absolute) 12/03/2021 1 6     Monocytes (Absolute) 12/03/2021 0 7     Eosinophils (Absolute) 12/03/2021 0 1     Basophils ABS 12/03/2021 0 0     Immature Granulocytes 12/03/2021 0     Immature Granulocytes (A* 12/03/2021 0 0     Glucose, Random 12/03/2021 108*    BUN 12/03/2021 22     Creatinine 12/03/2021 0 91     eGFR Non  12/03/2021 80     eGFR  12/03/2021 93     SL AMB BUN/CREATININE RA* 12/03/2021 24     Sodium 12/03/2021 140     Potassium 12/03/2021 3 7     Chloride 12/03/2021 101     CO2 12/03/2021 27     CALCIUM 12/03/2021 9 4     Protein, Total 12/03/2021 7 0     Albumin 12/03/2021 4 4     Globulin, Total 12/03/2021 2 6     Albumin/Globulin Ratio 12/03/2021 1 7     TOTAL BILIRUBIN 12/03/2021 1 5*    Alk Phos Isoenzymes 12/03/2021 100     AST 12/03/2021 32     ALT 12/03/2021 38     Cholesterol, Total 12/03/2021 128     Triglycerides 12/03/2021 145     HDL 12/03/2021 34*    VLDL Cholesterol Calcula* 12/03/2021 25     LDL Calculated 12/03/2021 69      Imaging: No results found  Review of Systems:  Review of Systems   REVIEW OF SYSTEMS:  Constitutional:  Denies fever or chills   Lack of stamina and strength   Eyes:  Denies change in visual acuity   HENT:  Denies nasal congestion or sore throat   Respiratory: shortness of breath   Cardiovascular:  Denies chest pain or edema   GI:  Denies abdominal pain, nausea, vomiting, bloody stools or diarrhea   :  Denies dysuria, frequency, difficulty in micturition and nocturia  Musculoskeletal:  Denies back pain or joint pain   Neurologic:  Denies headache, focal weakness or sensory changes   Endocrine:  Denies polyuria or polydipsia   Lymphatic:  Denies swollen glands   Psychiatric:  Denies depression or anxiety     Physical Exam:    /82 (BP Location: Left arm, Patient Position: Sitting, Cuff Size: Standard)   Pulse 63   Ht 6' (1 829 m)   Wt 105 kg (230 lb 6 4 oz)   SpO2 97%   BMI 31 25 kg/m²     Physical Exam   PHYSICAL EXAM:  General:  Patient is not in acute distress   Head: Normocephalic, Atraumatic  HEENT:  Both pupils normal-size atraumatic, normocephalic, nonicteric  Neck:  JVP not raised  Trachea central  No carotid bruit  Respiratory:  normal breath sounds no crackles  no rhonchi  Cardiovascular:  Regular rate and rhythm no S3 3/6 systolic ejection murmur in the right sternal border  GI:  Abdomen soft nontender  No organomegaly  Lymphatic:  No cervical or inguinal lymphadenopathy  Neurologic:  Patient is awake alert, oriented   Grossly nonfocal  Extremities no edema    Discussion/Summary: This patient has known history of coronary artery disease and moderate aortic stenosis and has symptoms of fatigue and shortness of breath with exertion    Patient will need to be scheduled for an echocardiogram to reassess and look for any progression of aortic stenosis  Since patient has multiple risk factors for coronary artery disease and known history, patient will be scheduled for pharmacological nuclear stress test to assess for ischemia  Patient is unable to exercise on the treadmill because of DJD  Symptoms to watch out from cardiac standpoint which would indicate the need for further cardiac evaluation discussed  Patient will continue to follow-up with primary care physician  Blood work through primary care physician  Patient had a few questions which were answered  If there is progression of aortic stenosis to severe degree or he is symptomatic, will need consideration for TAVR evaluation  He understands the same

## 2022-01-20 ENCOUNTER — OFFICE VISIT (OUTPATIENT)
Dept: INTERNAL MEDICINE CLINIC | Facility: CLINIC | Age: 77
End: 2022-01-20
Payer: COMMERCIAL

## 2022-01-20 VITALS
HEIGHT: 72 IN | HEART RATE: 64 BPM | DIASTOLIC BLOOD PRESSURE: 74 MMHG | BODY MASS INDEX: 31.56 KG/M2 | SYSTOLIC BLOOD PRESSURE: 132 MMHG | WEIGHT: 233 LBS

## 2022-01-20 DIAGNOSIS — E78.2 MIXED HYPERLIPIDEMIA: Chronic | ICD-10-CM

## 2022-01-20 DIAGNOSIS — I25.10 ATHEROSCLEROSIS OF NATIVE CORONARY ARTERY OF NATIVE HEART WITHOUT ANGINA PECTORIS: Chronic | ICD-10-CM

## 2022-01-20 DIAGNOSIS — I10 BENIGN ESSENTIAL HYPERTENSION: Primary | Chronic | ICD-10-CM

## 2022-01-20 PROCEDURE — 99214 OFFICE O/P EST MOD 30 MIN: CPT | Performed by: INTERNAL MEDICINE

## 2022-01-20 PROCEDURE — 3725F SCREEN DEPRESSION PERFORMED: CPT | Performed by: INTERNAL MEDICINE

## 2022-01-20 PROCEDURE — 1036F TOBACCO NON-USER: CPT | Performed by: INTERNAL MEDICINE

## 2022-01-20 PROCEDURE — 1160F RVW MEDS BY RX/DR IN RCRD: CPT | Performed by: INTERNAL MEDICINE

## 2022-01-20 RX ORDER — MIRTAZAPINE 15 MG/1
15 TABLET, FILM COATED ORAL
COMMUNITY
End: 2022-03-29

## 2022-01-20 NOTE — PROGRESS NOTES
[facility] [address] [place] [address]    NAME: Rolo Winston  AGE: 64 y o  SEX: male  : 1945     DATE: 2022     Assessment and Plan:     1  Benign essential hypertension    BP is stable  Continue current anti-HTN medications  - Basic metabolic panel; Future    2  Mixed hyperlipidemia    Cholesterol well controlled  Continue statin  - Lipid panel; Future    3  Atherosclerosis of native coronary artery of native heart without angina pectoris    He is having some increased SOB on exertion  Saw his cardiologist who ordered ECHO and stress test  Also known history of moderate AS  Continue current medications and follow-up results of studies with cardiology  Return in about 7 months (around 2022) for Subsequent AWV  History of Present Illness:       Geremias Vasquez presents for f/u  He saw his cardiologist recently and noted to have some increased SOB on exertion  Has an ECHO/stress test scheduled  Labs are controlled  Has known history of moderate AS  Review of Systems:     Review of Systems   Constitutional: Positive for fatigue  Negative for activity change and appetite change  Respiratory: Positive for shortness of breath  Negative for apnea, cough, chest tightness and wheezing  Cardiovascular: Negative for chest pain, palpitations and leg swelling  Gastrointestinal: Negative for abdominal distention, abdominal pain, blood in stool, constipation, diarrhea, nausea and vomiting  Neurological: Negative for dizziness, weakness, light-headedness, numbness and headaches  Psychiatric/Behavioral: Negative for behavioral problems, confusion, hallucinations, sleep disturbance and suicidal ideas  The patient is not nervous/anxious  Objective:     /74   Pulse 64   Ht 6' (1 829 m)   Wt 106 kg (233 lb)   BMI 31 60 kg/m²     Physical Exam  Constitutional:       General: He is not in acute distress  Appearance: He is obese   He is not ill-appearing  Cardiovascular:      Rate and Rhythm: Normal rate and regular rhythm  Heart sounds: No murmur heard  Pulmonary:      Effort: Pulmonary effort is normal  No respiratory distress  Breath sounds: No wheezing  Abdominal:      General: Bowel sounds are normal  There is no distension  Tenderness: There is no abdominal tenderness  Musculoskeletal:      Right lower leg: No edema  Left lower leg: No edema  Neurological:      Mental Status: He is alert           Carla Hightower DO  [facility]

## 2022-01-20 NOTE — PATIENT INSTRUCTIONS
DASH Eating Plan   AMBULATORY CARE:   The DASH (Dietary Approaches to Stop Hypertension) Eating Plan  is designed to help prevent or lower high blood pressure  It can also help to lower LDL (bad) cholesterol and decrease your risk for heart disease  The plan is low in sodium, sugar, unhealthy fats, and total fat  It is high in potassium, calcium, magnesium, and fiber  These nutrients are added when you eat more fruits, vegetables, and whole grains  With the DASH eating plan, you need to eat a certain number of servings from each food group  This will help you get enough of certain nutrients and limit others  The amount of servings you should eat depends on how many calories you need  Your dietitian can help you create meal plans with the right number of servings for each food group  What you need to know about sodium:  Your dietitian will tell you how much sodium is safe for you to have each day  People with high blood pressure should have no more than 1,500 to 2,300 mg of sodium in a day  A teaspoon (tsp) of salt has 2,300 mg of sodium  This may seem like a difficult goal, but small changes to the foods you eat can make a big difference  Your healthcare provider or dietitian can help you create a meal plan that follows your sodium limit  · Read food labels  Food labels can help you choose foods that are low in sodium  The amount of sodium is listed in milligrams (mg)  The % Daily Value (DV) column tells you how much of your daily needs are met by 1 serving of the food for each nutrient listed  Choose foods that have less than 5% of the DV of sodium  These foods are considered low in sodium  Foods that have 20% or more of the DV of sodium are considered high in sodium  Avoid foods that have more than 300 mg of sodium in each serving  Choose foods that say low-sodium, reduced-sodium, or no salt added on the food label  · Limit added salt  Do not salt food at the table if you add salt when you cook  Use herbs and spices, such as onions, garlic, and salt-free seasonings to add flavor  Try lemon or lime juice or vinegar to add a tart flavor  Use hot peppers or a small amount of hot pepper sauce to add a spicy flavor  Limit foods high in added salt, such as the following:    ? Seasonings made with salt, such as garlic salt, celery salt, onion salt, seasoned salt, meat tenderizers, and monosodium glutamate (MSG)    ? Miso soup and canned or dried soup mixes    ? Regular soy sauce, barbecue sauce, teriyaki sauce, steak sauce, Worcestershire sauce, and most flavored vinegars    ? Snack foods, such as salted chips, popcorn, pretzels, pork rinds, salted crackers, and salted nuts    ? Frozen foods, such as dinners, entrees, vegetables with sauces, and breaded meats    · Ask about salt substitutes  Ask your healthcare provider if you may use salt substitutes  Some salt substitutes have ingredients that can be harmful if you have certain health conditions  · Choose foods carefully at restaurants  Meals from restaurants, especially fast food restaurants, are often high in sodium  Some restaurants have nutrition information that tells you the amount of sodium in their foods  Ask to have your food prepared with less, or no salt  What you need to know about fats:  Healthy fats include unsaturated fats and omega-3 fatty acids  Unhealthy fats include saturated fats and trans fats  · Include healthy fats, such as the following:      ? Cooking oils, such as soybean, canola, olive, or sunflower    ? Fatty fish, such as salmon, tuna, mackerel, or sardines    ? Flaxseed oil or ground flaxseed    ? ½ cup of cooked beans, such as black beans, kidney beans, or berman beans    ? 1½ ounces of low-sodium nuts, such as almonds or walnuts    ? Low-sugar, low-sodium peanut butter    ? Seeds such as violet seeds or sunflower seeds       · Limit or do not have unhealthy fats, such as the following:      ?  Foods that contain fat from animals, such as fatty meats, whole milk, butter, and cream    ? Shortening, stick margarine, palm oil, and coconut oil    ? Full-fat or creamy salad dressing    ? Creamy soup    ? Crackers, chips, and baked goods made with margarine or shortening    ? Foods that are fried in unhealthy fats    ? Gravy and sauces, such as Noe or cheese sauces    What you need to know about carbohydrates (carbs): All carbs break down into sugar  Complex carbs contain more fiber than simple carbs  This means complex carbs go into the bloodstream more slowly and cause less of a blood sugar spike  Try to include more complex carbs and fewer simple carbs  · Include complex carbs, such as the following:      ? 1 slice of whole-grain bread    ? 1 ounce of dry cereal that does not contain added sugar    ? ½ cup of cooked oatmeal    ? 2 ounces of cooked whole-grain pasta    ? ½ cup of cooked brown rice    · Limit or do not have simple carbs, such as the following:      ? Baked goods, such as doughnuts, pastries, and cookies    ? Mixes for cornbread and biscuits    ? White rice and pasta mixes, such as boxed macaroni and cheese    ? Instant and cold cereals that contain sugar    ? Jelly, jam, and ice cream that contain sugar    ? Condiments such as ketchup    ? Drinks high in sugar, such as soft drinks, lemonade, and fruit juice    What you need to know about vegetables and fruits:  Vegetables and fruits can be fresh, frozen, or canned  If possible, try to choose low-sodium canned options  · Include a variety of vegetables and fruits, such as the following:      ? 1 medium apple, pear, or peach (about ½ cup chopped)    ? ½ small banana    ? ½ cup berries, such as blueberries, strawberries, or blackberries    ? 1 cup of raw leafy greens, such as lettuce, spinach, kale, or matt greens    ? ½ cup of frozen or canned (no added salt) vegetables, such as green beans    ?  ½ cup of fresh, frozen, or canned fruit (canned in light syrup or fruit juice)    ? ½ cup of vegetable or fruit juice    · Limit or do not have vegetables and fruits made in the following ways:      ? Frozen fruit such as cherries that have added sugar    ? Fruit in cream or butter sauce    ? Canned vegetables that are high in sodium    ? Sauerkraut, pickled vegetables, and other foods prepared in brine    ? Fried vegetables or vegetables in butter or high-fat sauces    What you need to know about protein foods:   · Include lean or low-fat protein foods, such as the following:      ? Poultry (chicken, turkey) with no skin    ? Fish (especially fatty fish, such as salmon, fresh tuna, or mackerel)    ? Lean beef and pork (loin, round, extra lean hamburger)    ? Egg whites and egg substitutes    ? 1 cup of nonfat (skim) or 1% milk    ? 1½ ounces of fat-free or low-fat cheese    ? 6 ounces of nonfat or low-fat yogurt    · Limit or do not have high-fat protein foods, such as the following:      ? Smoked or cured meat, such as corned beef, thomas, ham, hot dogs, and sausage    ? Canned beans and canned meats or spreads, such as potted meats, sardines, anchovies, and imitation seafood    ? Deli or lunch meats, such as bologna, ham, turkey, and roast beef    ? High-fat meat (T-bone steak, regular hamburger, and ribs)    ? Whole eggs and egg yolks    ? Whole milk, 2% milk, and cream    ? Regular cheese and processed cheese    Other guidelines to follow:   · Maintain a healthy weight  Your risk for heart disease is higher if you are overweight  Your healthcare provider may suggest that you lose weight if you are overweight  You can lose weight by eating fewer calories and foods that have added sugars and fat  The DASH meal plan can help you do this  Decrease calories by eating smaller portions at each meal and fewer snacks  Ask your healthcare provider for more information about how to lose weight  · Exercise regularly  Regular exercise can help you reach or maintain a healthy weight  Regular exercise can also help decrease your blood pressure and improve your cholesterol levels  Get 30 minutes or more of moderate exercise each day of the week  To lose weight, get at least 60 minutes of exercise  Talk to your healthcare provider about the best exercise program for you  · Limit alcohol  Women should limit alcohol to 1 drink a day  Men should limit alcohol to 2 drinks a day  A drink of alcohol is 12 ounces of beer, 5 ounces of wine, or 1½ ounces of liquor  For more information:   · [facility] and [address]  [address] [address]  [place] [address]  Phone: [phone] [phone]  Web Address: [facility] no    © 7[facility] 2021 Information is for End User's use only and may not be sold, redistributed or otherwise used for commercial purposes  All illustrations and images included in CareNotes® are the copyrighted property of [employer]  or [address] [place] [address]  The above information is an  only  It is not intended as medical advice for individual conditions or treatments  Talk to your doctor, nurse or pharmacist before following any medical regimen to see if it is safe and effective for you

## 2022-01-24 ENCOUNTER — TELEPHONE (OUTPATIENT)
Dept: CARDIOLOGY CLINIC | Facility: CLINIC | Age: 77
End: 2022-01-24

## 2022-01-24 NOTE — TELEPHONE ENCOUNTER
Name of Caller: Jalen Zhagn from [facility]  Problem/Symptoms: Jalen Zhang contacting office regarding patient's NUC  She needs last 2 o/v notes to include EKG, cardiac testing and any labs      Please fax to : [phone]    Use reference #: [id number]    Call back number:[phone]    Last or Next appt:

## 2022-01-25 ENCOUNTER — TELEPHONE (OUTPATIENT)
Dept: CARDIOLOGY CLINIC | Facility: CLINIC | Age: 77
End: 2022-01-25

## 2022-01-25 NOTE — TELEPHONE ENCOUNTER
Called for peer to peer, patient information provided, told that they will call with authorization number in 2 hours.

## 2022-01-25 NOTE — TELEPHONE ENCOUNTER
Mi, from Dr. Linda office called & stated that a physician call in 1 hour for peer to peer for patient for nuclear stress test..        # [phone]- Dr. Linda #

## 2022-02-08 ENCOUNTER — HOSPITAL ENCOUNTER (OUTPATIENT)
Dept: NON INVASIVE DIAGNOSTICS | Facility: CLINIC | Age: 77
Discharge: HOME | End: 2022-02-08
Payer: COMMERCIAL

## 2022-02-08 ENCOUNTER — HOSPITAL ENCOUNTER (OUTPATIENT)
Dept: NON INVASIVE DIAGNOSTICS | Facility: CLINIC | Age: 77
End: 2022-02-08
Payer: COMMERCIAL

## 2022-02-08 VITALS
SYSTOLIC BLOOD PRESSURE: 132 MMHG | BODY MASS INDEX: 31.56 KG/M2 | HEIGHT: 72 IN | DIASTOLIC BLOOD PRESSURE: 74 MMHG | WEIGHT: 233 LBS | HEART RATE: 64 BPM

## 2022-02-08 DIAGNOSIS — I20.8 ANGINAL EQUIVALENT (HCC): ICD-10-CM

## 2022-02-08 DIAGNOSIS — R06.02 SHORTNESS OF BREATH: ICD-10-CM

## 2022-02-08 DIAGNOSIS — I35.9 AORTIC VALVE DISORDER: ICD-10-CM

## 2022-02-08 DIAGNOSIS — I25.10 ATHEROSCLEROSIS OF NATIVE CORONARY ARTERY OF NATIVE HEART WITHOUT ANGINA PECTORIS: ICD-10-CM

## 2022-02-08 LAB
AORTIC ROOT: 3.1 CM
AORTIC VALVE MEAN VELOCITY: 28.8 M/S
APICAL FOUR CHAMBER EJECTION FRACTION: 54 %
AV AREA BY CONTINUOUS VTI: 0.7 CM2
AV AREA PEAK VELOCITY: 0.7 CM2
AV LVOT MEAN GRADIENT: 2 MMHG
AV LVOT PEAK GRADIENT: 3 MMHG
AV MEAN GRADIENT: 44 MMHG
AV PEAK GRADIENT: 64 MMHG
AV VALVE AREA: 0.69 CM2
AV VELOCITY RATIO: 0.21
DOP CALC AO PEAK VEL: 3.7 M/S
DOP CALC AO VTI: 105.31 CM
DOP CALC LVOT AREA: 3.46 CM2
DOP CALC LVOT DIAMETER: 2.1 CM
DOP CALC LVOT PEAK VEL VTI: 21.13 CM
DOP CALC LVOT PEAK VEL: 0.79 M/S
DOP CALC LVOT STROKE INDEX: 33 ML/M2
DOP CALC LVOT STROKE VOLUME: 73.15 CM3
E WAVE DECELERATION TIME: 317 MS
FRACTIONAL SHORTENING: 30 % (ref 28–44)
INTERVENTRICULAR SEPTUM IN DIASTOLE (PARASTERNAL SHORT AXIS VIEW): 1.4 CM (ref 0.57–1.06)
LEFT ATRIUM AREA SYSTOLE SINGLE PLANE A4C: 21.4 CM2
LEFT ATRIUM SIZE: 4.9 CM
LEFT INTERNAL DIMENSION IN SYSTOLE: 2.8 CM (ref 2.1–4)
LEFT VENTRICULAR INTERNAL DIMENSION IN DIASTOLE: 4 CM (ref 8.05–12)
LEFT VENTRICULAR POSTERIOR WALL IN END DIASTOLE: 1 CM (ref 0.55–1.05)
LEFT VENTRICULAR STROKE VOLUME: 40 ML
MV E'TISSUE VEL-SEP: 6 CM/S
MV PEAK A VEL: 0.88 M/S
MV PEAK E VEL: 54 CM/S
MV STENOSIS PRESSURE HALF TIME: 0 MS
RIGHT ATRIUM AREA SYSTOLE A4C: 22.3 CM2
RIGHT VENTRICLE ID DIMENSION: 4.7 CM
SL CV PED ECHO LEFT VENTRICLE DIASTOLIC VOLUME (MOD BIPLANE) 2D: 70 ML
SL CV PED ECHO LEFT VENTRICLE SYSTOLIC VOLUME (MOD BIPLANE) 2D: 31 ML
Z-SCORE OF INTERVENTRICULAR SEPTUM IN END DIASTOLE: 4.61
Z-SCORE OF LEFT VENTRICULAR DIMENSION IN END SYSTOLE: -8.99
Z-SCORE OF LEFT VENTRICULAR POSTERIOR WALL IN END DIASTOLE: 1.56

## 2022-02-08 PROCEDURE — 93306 TTE W/DOPPLER COMPLETE: CPT

## 2022-02-08 PROCEDURE — 93306 TTE W/DOPPLER COMPLETE: CPT | Performed by: INTERNAL MEDICINE

## 2022-02-09 ENCOUNTER — HOSPITAL ENCOUNTER (OUTPATIENT)
Dept: NON INVASIVE DIAGNOSTICS | Facility: CLINIC | Age: 77
Discharge: HOME | End: 2022-02-09
Payer: COMMERCIAL

## 2022-02-09 ENCOUNTER — TELEPHONE (OUTPATIENT)
Dept: CARDIOLOGY CLINIC | Facility: CLINIC | Age: 77
End: 2022-02-09

## 2022-02-09 VITALS
HEIGHT: 72 IN | BODY MASS INDEX: 31.56 KG/M2 | OXYGEN SATURATION: 98 % | WEIGHT: 233 LBS | SYSTOLIC BLOOD PRESSURE: 168 MMHG | DIASTOLIC BLOOD PRESSURE: 86 MMHG | HEART RATE: 59 BPM

## 2022-02-09 LAB
MAX DIASTOLIC BP: 86 MMHG
MAX HEART RATE: 87 BPM
MAX PREDICTED HEART RATE: 142 BPM
MAX. SYSTOLIC BP: 168 MMHG
NUC STRESS DIASTOLIC VOLUME INDEX: 64 ML/M2
NUC STRESS EJECTION FRACTION: 58 %
NUC STRESS SYSTOLIC VOLUME INDEX: 27 ML/M2
PROTOCOL NAME: NORMAL
RATE PRESSURE PRODUCT: NORMAL
REASON FOR TERMINATION: NORMAL
SL CV REST NUCLEAR ISOTOPE DOSE: 10.42 MCI
SL CV STRESS NUCLEAR ISOTOPE DOSE: 32.9 MCI
SL CV STRESS RECOVERY BP: NORMAL MMHG
SL CV STRESS RECOVERY HR: 67 BPM
STRESS ANGINA INDEX: 0
STRESS BASELINE BP: NORMAL MMHG
STRESS BASELINE HR: 59 BPM
STRESS O2 SAT REST: 98 %
STRESS PEAK HR: 87 BPM
STRESS POST O2 SAT PEAK: 100 %
STRESS POST PEAK BP: 168 MMHG
STRESS/REST PERFUSION RATIO: 1
TARGET HR FORMULA: NORMAL
TIME IN EXERCISE PHASE: NORMAL

## 2022-02-09 PROCEDURE — 93017 CV STRESS TEST TRACING ONLY: CPT

## 2022-02-09 PROCEDURE — 78452 HT MUSCLE IMAGE SPECT MULT: CPT

## 2022-02-09 PROCEDURE — 93016 CV STRESS TEST SUPVJ ONLY: CPT | Performed by: INTERNAL MEDICINE

## 2022-02-09 PROCEDURE — G1004 CDSM NDSC: HCPCS

## 2022-02-09 PROCEDURE — A9502 TC99M TETROFOSMIN: HCPCS

## 2022-02-09 PROCEDURE — 78452 HT MUSCLE IMAGE SPECT MULT: CPT | Performed by: INTERNAL MEDICINE

## 2022-02-09 PROCEDURE — 93018 CV STRESS TEST I&R ONLY: CPT | Performed by: INTERNAL MEDICINE

## 2022-02-09 RX ADMIN — REGADENOSON 0.4 MG: 0.08 INJECTION, SOLUTION INTRAVENOUS at 12:50

## 2022-02-09 NOTE — TELEPHONE ENCOUNTER
----- Message from Trent Marcos MD sent at 2/9/2022  3:42 PM EST -----  Please get this patient an appointment in the next 7 to 10 days  Patient has severe aortic stenosis by echocardiogram and we need to discuss options of TAVR  Thank you

## 2022-02-15 ENCOUNTER — OFFICE VISIT (OUTPATIENT)
Dept: CARDIOLOGY CLINIC | Facility: CLINIC | Age: 77
End: 2022-02-15
Payer: COMMERCIAL

## 2022-02-15 VITALS
HEIGHT: 72 IN | WEIGHT: 234.7 LBS | HEART RATE: 68 BPM | BODY MASS INDEX: 31.79 KG/M2 | DIASTOLIC BLOOD PRESSURE: 80 MMHG | OXYGEN SATURATION: 98 % | SYSTOLIC BLOOD PRESSURE: 150 MMHG

## 2022-02-15 DIAGNOSIS — R06.02 SHORTNESS OF BREATH: ICD-10-CM

## 2022-02-15 DIAGNOSIS — I25.10 ATHEROSCLEROSIS OF NATIVE CORONARY ARTERY OF NATIVE HEART WITHOUT ANGINA PECTORIS: Primary | ICD-10-CM

## 2022-02-15 DIAGNOSIS — I10 HYPERTENSION, ESSENTIAL: ICD-10-CM

## 2022-02-15 DIAGNOSIS — I35.9 AORTIC VALVE DISORDER: ICD-10-CM

## 2022-02-15 PROCEDURE — 99214 OFFICE O/P EST MOD 30 MIN: CPT | Performed by: INTERNAL MEDICINE

## 2022-02-15 NOTE — PROGRESS NOTES
[facility] [address] [address]  Cardiology Follow Up    Bryn Killian [facility]  1945  [id number]      1  Atherosclerosis of native coronary artery of native heart without angina pectoris     2  Aortic valve disorder     3  Hypertension, essential     4  Shortness of breath         Chief Complaint   Patient presents with    Follow-up     Discuss TAVR options       Interval History:  Patient presents for follow-up visit  Patient has history of CAD status post CABG approximately 50 to 17 years ago  Patient also has history of hypertension hyperlipidemia as well as history of stroke in the past   Patient does have history of aortic stenosis and recently had a repeat echocardiogram which showed progression of aortic stenosis to severe degree  Patient does have shortness of breath with exertion  Patient lives by himself in the area  Patient's family is in [place]  Patient denies any chest pain  No palpitations  No history of leg edema orthopnea PND  No history of bleeding issues  He states that he has been compliant with all his present medications      Patient Active Problem List   Diagnosis    Allergic rhinitis    Moderate aortic stenosis    Atherosclerotic heart disease of native coronary artery without angina pectoris    Benign essential hypertension    BPH with obstruction/lower urinary tract symptoms    Gait instability    Hyperlipidemia    Insomnia    Male erectile dysfunction    Pulmonary nodule seen on imaging study    History of stroke     Past Medical History:   Diagnosis Date    Arthritis     Cardiac disorder     Hypertension     Stroke (cerebrum) ([address] )      Social History     Socioeconomic History    Marital status: Single     Spouse name: Not on file    Number of children: Not on file    Years of education: Not on file    Highest education level: Not on file   Occupational History    Not on file   Tobacco Use    Smoking status: Former Smoker     Packs/day: 1 50     Years: 4 00     Pack years: 6 00     Types: Cigarettes     Quit date:      Years since quittin 1    Smokeless tobacco: Never Used    Tobacco comment: former light tobacco smoker   Vaping Use    Vaping Use: Never used   Substance and Sexual Activity    Alcohol use: No    Drug use: No    Sexual activity: Never   Other Topics Concern    Not on file   Social History Narrative    Caffeine use    No advance directives     Social Determinants of Health     Financial Resource Strain: Not on file   Food Insecurity: Not on file   Transportation Needs: Not on file   Physical Activity: Insufficiently Active    Days of Exercise per Week: 7 days    Minutes of Exercise per Session: 20 min   Stress: Stress Concern Present    Feeling of Stress :  To some extent   Social Connections: Not on file   Intimate Partner Violence: Not on file   Housing Stability: Not on file      Family History   Problem Relation Age of Onset    Coronary artery disease Mother     Arthritis Mother     Hypertension Mother     Stroke Mother     Other Father         aortic valve replacement; CABG    Arthritis Father     Hypertension Brother     Stroke Brother      Past Surgical History:   Procedure Laterality Date    CORONARY ARTERY BYPASS GRAFT  2004    x2 LIMA-LAD, SVG-RCA    TONSILLECTOMY         Current Outpatient Medications:     amLODIPine (NORVASC) 5 mg tablet, Take 1 tablet (5 mg total) by mouth daily, Disp: 90 tablet, Rfl: 2    aspirin (ASPIRIN LOW DOSE) 81 mg EC tablet, Take 1 tablet by mouth daily, Disp: , Rfl:     atorvastatin (LIPITOR) 40 mg tablet, Take 1 tablet (40 mg total) by mouth daily, Disp: 90 tablet, Rfl: 2    carvedilol (COREG) 6 25 mg tablet, Take 1 tablet (6 25 mg total) by mouth 2 (two) times a day, Disp: 180 tablet, Rfl: 3    Cholecalciferol (VITAMIN D3) 5000 units CAPS, Take by mouth daily , Disp: , Rfl:     clopidogrel (PLAVIX) 75 mg tablet, Take 1 tablet (75 mg total) by mouth daily, Disp: 90 tablet, Rfl: 3    finasteride (PROSCAR) 5 mg tablet, Take 1 tablet (5 mg total) by mouth daily, Disp: 90 tablet, Rfl: 2    mirtazapine (REMERON) 15 mg tablet, Take 15 mg by mouth daily at bedtime, Disp: , Rfl:     olmesartan-hydrochlorothiazide (BENICAR HCT) 40-25 MG per tablet, Take 1 tablet by mouth daily, Disp: 90 tablet, Rfl: 2  No Known Allergies    Labs:  Hospital Outpatient Visit on 02/08/2022   Component Date Value    Baseline HR 02/09/2022 59     Baseline BP 02/09/2022 168/86     O2 sat rest 02/09/2022 98     Rest Nuclear Isotope Dose 02/09/2022 10 42     Stress Nuclear Isotope D* 02/09/2022 32 90     Stress peak HR 02/09/2022 87     Post peak BP 02/09/2022 168     Rate Pressure Product 02/09/2022 14,616 0     O2 sat peak 02/09/2022 100     Recovery HR 02/09/2022 67     Recovery BP 02/09/2022 142/80     Angina Index 02/09/2022 0     Stress/rest perfusion ra* 02/09/2022 6 76     End diastolic index (mL/* 94/27/3461 64 0     EF (%) 02/09/2022 58     End systolic index (mL/m* 14/15/7986 27 0     Protocol Name 02/09/2022 LEXISCAN-SIT     Time In Exercise Phase 02/09/2022 00:03:00     MAX   SYSTOLIC BP 25/76/7917 970     Max Diastolic Bp 40/96/2654 86     Max Heart Rate 02/09/2022 87     Max Predicted Heart Rate 02/09/2022 142     Reason for Termination 02/09/2022 Protocol Complete     Test Indication 02/09/2022                      Value:CAD  SOB  ANGINAL EQUIVALENT      Target Hr Formular 02/09/2022 (220 - Age)*85%    Hospital Outpatient Visit on 02/08/2022   Component Date Value    AV area peak lena 02/08/2022 0 7     LA size 02/08/2022 4 9     Aortic valve mean veloci* 02/08/2022 28 80     LVPWd 02/08/2022 1 00     MV E' Tissue Velocity Se* 02/08/2022 6     IVSd 02/08/2022 6 15     LV DIASTOLIC VOLUME (MOD* 61/49/9465 70     LEFT VENTRICLE SYSTOLIC * 20/96/2449 31     Left ventricular stroke * 02/08/2022 40 00     A4C EF 02/08/2022 54     LVIDd 02/08/2022 4 00     LVIDS 02/08/2022 2 80     FS 02/08/2022 30     Ao root 02/08/2022 3 10     RVID d 02/08/2022 4 7     LVOT mn grad 02/08/2022 2 0     AV area by cont VTI 02/08/2022 0 7     AV mean gradient 02/08/2022 44 0     AV LVOT peak gradient 02/08/2022 3     E wave deceleration time 02/08/2022 317     LVOT diameter 02/08/2022 2 1     LVOT peak ryder 02/08/2022 0 79     LVOT peak VTI 02/08/2022 21 13     Ao peak ryder retrograde 02/08/2022 3 7     Ao VTI 02/08/2022 105 31     LVOT stroke volume 02/08/2022 73 15     AV peak gradient 02/08/2022 64 0     MV Peak E Ryder 02/08/2022 54     MV Peak A Ryder 02/08/2022 0 88     RYANNE A4C 02/08/2022 21 4     RAA A4C 02/08/2022 22 3     MV stenosis pressure 1/2* 02/08/2022 0     LVOT SI 02/08/2022 33 00     LVOT area 02/08/2022 3 46     AV Velocity Ratio 02/08/2022 0 21     AV valve area 02/08/2022 0 69     ZLVPWD 02/08/2022 1 56     ZLVIDS 02/08/2022 -8 99     ZIVSD 02/08/2022 4 61      Imaging: Stress strip    Result Date: 2/9/2022  Narrative: Confirmed by Torito Baker ([id number]), editor Tyrel Wray ([id number]) on 2/9/2022 1:09:07 PM    Echo complete w/ contrast if indicated    Result Date: 2/8/2022  Narrative: Inés Torres  Left Ventricle: Left ventricular cavity size is normal  Systolic function is normal (55%)  Wall motion cannot be accurately assessed  Diastolic function is mildly abnormal, consistent with grade I (abnormal) relaxation  There is concentric LV hypertrophy    Right Ventricle: Right ventricular cavity size is dilated  Systolic function is normal    Left Atrium: The atrium is mildly dilated    Right Atrium: The atrium is mildly dilated    Aortic Valve: The aortic valve is trileaflet  The leaflets are mildly thickened  The leaflets are mildly calcified  There is reduced mobility  There is severe stenosis  Peak and mean AV gradients were 64 and 44 mm Hg respectively  MICHELLE by the continuity equation method was 0 7 sq cm    Mitral Valve: There is mild annular calcification  There is trace regurgitation    Tricuspid Valve: There is trace regurgitation  NM myocardial perfusion spect (rx stress and/or rest)    Result Date: 2/9/2022  Narrative: No chest discomfort or EKG changes with Lexiacan There is a moderate intensity small fixed inferobasal defect most consistent with inferobasal scar but could not r/o tissue attenuation Inferobasal hypokinesis  Ejection fraction =58%  No evidence of ischemia       Review of Systems:  Review of Systems   REVIEW OF SYSTEMS:  Constitutional:  Denies fever or chills   Eyes:  Denies change in visual acuity   HENT:  Denies nasal congestion or sore throat   Respiratory:shortness of breath   Cardiovascular:  Denies chest pain or edema   GI:  Denies abdominal pain, nausea, vomiting, bloody stools or diarrhea   :  Denies dysuria, frequency, difficulty in micturition and nocturia  Musculoskeletal:  Denies back pain or joint pain   Neurologic:  Denies headache, focal weakness or sensory changes   Endocrine:  Denies polyuria or polydipsia   Lymphatic:  Denies swollen glands   Psychiatric:  Denies depression or anxiety     Physical Exam:    /88 (BP Location: Left arm, Patient Position: Sitting, Cuff Size: Large)   Pulse 68   Ht 6' (1 829 m)   Wt 106 kg (234 lb 11 2 oz)   SpO2 98%   BMI 31 83 kg/m²     Physical Exam   PHYSICAL EXAM:  General:  Patient is not in acute distress   Head: Normocephalic, Atraumatic  HEENT:  Both pupils normal-size atraumatic, normocephalic, nonicteric  Neck:  JVP not raised  Trachea central  No carotid bruit  Respiratory:  normal breath sounds no crackles  no rhonchi  Cardiovascular:  Regular rate and rhythm no S3 3/6 systolic ejection murmur in the right sternal border  GI:  Abdomen soft nontender  No organomegaly  Lymphatic:  No cervical or inguinal lymphadenopathy  Neurologic:  Patient is awake alert, oriented    Grossly nonfocal  Extremities no edema    Discussion/Summary:  Patient with known history of CAD status post CABG , hypertension hyperlipidemia and history of stroke with severe aortic stenosis  Recent echocardiogram done showed        Left Ventricle: Left ventricular cavity size is normal  Systolic function is normal (55%)  Wall motion cannot be accurately assessed  Diastolic function is mildly abnormal, consistent with grade I (abnormal) relaxation  There is concentric LV hypertrophy    Right Ventricle: Right ventricular cavity size is dilated  Systolic function is normal     Left Atrium: The atrium is mildly dilated    Right Atrium: The atrium is mildly dilated    Aortic Valve: The aortic valve is trileaflet  The leaflets are mildly thickened  The leaflets are mildly calcified  There is reduced mobility  There is severe stenosis  Peak and mean AV gradients were 64 and 44 mm Hg respectively  MICHELLE by the continuity equation method was 0 7 sq cm    Mitral Valve: There is mild annular calcification  There is trace regurgitation    Tricuspid Valve: There is trace regurgitation  Patient also had a pharmacological nuclear stress test which showed a fixed defect  No ischemia  Ejection fraction was 58%  Lengthy discussion with patient regarding severe aortic stenosis  Options of TAVR were discussed  Patient will be referred to Cardiac surgery for an evaluation  Symptoms to watch out from cardiac standpoint which would indicate the need for further cardiac evaluation also discussed  Medications reviewed  Patient is agreeable with the plan of care  Referral placed to cardiac surgery

## 2022-02-23 ENCOUNTER — OFFICE VISIT (OUTPATIENT)
Dept: CARDIAC SURGERY | Facility: CLINIC | Age: 77
End: 2022-02-23
Payer: COMMERCIAL

## 2022-02-23 VITALS
HEART RATE: 66 BPM | SYSTOLIC BLOOD PRESSURE: 156 MMHG | WEIGHT: 236 LBS | OXYGEN SATURATION: 97 % | DIASTOLIC BLOOD PRESSURE: 96 MMHG | BODY MASS INDEX: 31.97 KG/M2 | RESPIRATION RATE: 18 BRPM | TEMPERATURE: 96.5 F | HEIGHT: 72 IN

## 2022-02-23 DIAGNOSIS — I35.9 AORTIC VALVE DISORDER: ICD-10-CM

## 2022-02-23 DIAGNOSIS — I35.0 NONRHEUMATIC AORTIC VALVE STENOSIS: Primary | ICD-10-CM

## 2022-02-23 DIAGNOSIS — Z13.6 ENCOUNTER FOR SCREENING FOR STENOSIS OF CAROTID ARTERY: ICD-10-CM

## 2022-02-23 DIAGNOSIS — Z01.818 PRE-OP TESTING: ICD-10-CM

## 2022-02-23 PROCEDURE — 1036F TOBACCO NON-USER: CPT | Performed by: NURSE PRACTITIONER

## 2022-02-23 PROCEDURE — 99205 OFFICE O/P NEW HI 60 MIN: CPT | Performed by: NURSE PRACTITIONER

## 2022-02-23 PROCEDURE — 1160F RVW MEDS BY RX/DR IN RCRD: CPT | Performed by: NURSE PRACTITIONER

## 2022-02-23 PROCEDURE — 3080F DIAST BP >= 90 MM HG: CPT | Performed by: NURSE PRACTITIONER

## 2022-02-23 PROCEDURE — 3077F SYST BP >= 140 MM HG: CPT | Performed by: NURSE PRACTITIONER

## 2022-02-23 NOTE — LETTER
February 23, 2022     Brenda Wakefield MD  [address] [place] [address]/[facility] [address]    Patient: Ne Richardson   YOB: 1945   Date of Visit: 2/23/2022       Dear Dr Ping Delgado: Thank you for referring Sera Mcconnell to me for evaluation  Below are my notes for this consultation  If you have questions, please do not hesitate to call me  I look forward to following your patient along with you  Sincerely,        Aj Hansen DO        CC: DO Cami Sahu CRNP  2/23/2022 10:38 AM  Attested  Consultation - Cardiothoracic Surgery   Charissa Winston 64 y o  male MRN: [id number]    Physician Requesting Consult: Dr Ping Delgado    Reason for Consult / Principal Problem: Aortic stenosis, Non-Rheumatic    History of Present Illness: Ne Richardson is a 64y o  year old male who presents for initial outpatient surgical consultation for symptomatic severe aortic stenosis  Patient's PMHx is notable for CAD s/p CABG x 2 (LIMA-LAD, SVG-RCA; 2004), HTN, HLD, 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, arthritis, BPH and stable lung nodule  Patient followed by his cardiologist and PCP  Most recent Echo with progression of his AS to severe range  Upon interview patient admits to TAPIA, "slowing down" and  lightheadedness  He denies chest pain, palpitations, presyncope/syncope, weight gain, PND or orthopnea  He lives alone in a single story home and states he is having trouble keeping up with the cleaning  He states all his family live in [place] but states he has a friend who lives across the street from him  Patient is a [employer]  He quit smoking at age 19  He denies alcohol or drug use  He is edentulous with full dentures,  Covid immunizations: 2/9/21 & 3/9/21; booster: 11/17/21        Past Medical History:  Past Medical History:   Diagnosis Date    Arthritis     Cardiac disorder     Hypertension     Stroke (cerebrum) [facility])          Past Surgical History: Past Surgical History:   Procedure Laterality Date    CORONARY ARTERY BYPASS GRAFT  2004    x2 LIMA-LAD, SVG-RCA    TONSILLECTOMY           Family History:  Family History   Problem Relation Age of Onset    Coronary artery disease Mother     Arthritis Mother     Hypertension Mother     Stroke Mother     Other Father         aortic valve replacement; CABG    Arthritis Father     Hypertension Brother     Stroke Brother          Social History:    Social History     Substance and Sexual Activity   Alcohol Use No     Social History     Substance and Sexual Activity   Drug Use No     Social History     Tobacco Use   Smoking Status Former Smoker    Packs/day: 1 50    Years: 4 00    Pack years: 6 00    Types: Cigarettes    Quit date: Silvana Callahan Years since quittin 1   Smokeless Tobacco Never Used   Tobacco Comment    former light tobacco smoker         Home Medications:   Prior to Admission medications    Medication Sig Start Date End Date Taking?  Authorizing Provider   amLODIPine (NORVASC) 5 mg tablet Take 1 tablet (5 mg total) by mouth daily 21  Yes Jakob Doss,    aspirin (ASPIRIN LOW DOSE) 81 mg EC tablet Take 1 tablet by mouth daily 17  Yes Historical Provider, MD   atorvastatin (LIPITOR) 40 mg tablet Take 1 tablet (40 mg total) by mouth daily 21  Yes Jakob Doss DO   carvedilol (COREG) 6 25 mg tablet Take 1 tablet (6 25 mg total) by mouth 2 (two) times a day 21  Yes Jakob Doss, DO   Cholecalciferol (VITAMIN D3) 5000 units CAPS Take by mouth daily  14  Yes Historical Provider, MD   clopidogrel (PLAVIX) 75 mg tablet Take 1 tablet (75 mg total) by mouth daily 21 Yes Jakob Doss,    finasteride (PROSCAR) 5 mg tablet Take 1 tablet (5 mg total) by mouth daily 21  Yes Jakob Doss DO   mirtazapine (REMERON) 15 mg tablet Take 15 mg by mouth daily at bedtime   Yes Historical Provider, MD   olmesartan-hydrochlorothiazide (BENICAR HCT) 40-25 MG per tablet Take 1 tablet by mouth daily 8/19/21  Yes [facility],        Allergies:  No Known Allergies    Review of Systems:  Review of Systems - History obtained from chart review and the patient  General ROS: positive for  - fatigue and change in activity  negative for - chills, fever, sleep disturbance or weight gain  Psychological ROS: negative  Ophthalmic ROS: positive for - decreased vision  ENT ROS: edentulous; full dentures  Allergy and Immunology ROS: negative  Hematological and Lymphatic ROS: negative for - bleeding problems, blood clots, bruising or jaundice  Endocrine ROS: negative  Breast ROS: negative  Respiratory ROS: no cough, shortness of breath, or wheezing  Cardiovascular ROS: positive for - dyspnea on exertion and murmur  negative for - chest pain, irregular heartbeat, loss of consciousness, orthopnea, palpitations or paroxysmal nocturnal dyspnea  Gastrointestinal ROS: no abdominal pain, change in bowel habits, or black or bloody stools  Genito-Urinary ROS: positive for - urinary frequency/urgency  negative for - dysuria or hematuria  Musculoskeletal ROS: positive for - gait disturbance and muscular weakness  Neurological ROS: positive for - lightheadedness, gait disturbance, impaired coordination/balance and weakness  negative for - confusion, memory loss, speech problems or tremors  Dermatological ROS: negative    Vital Signs:     Vitals:    02/23/22 0936 02/23/22 0941   BP: 160/90 156/96   BP Location: Left arm Right arm   Patient Position: Sitting Sitting   Cuff Size: Standard    Pulse: 66    Resp: 18    Temp: (!) 96 5 °F (35 8 °C)    TempSrc: Tympanic    SpO2: 97%    Weight: 107 kg (236 lb)    Height: 6' (1 829 m)        Physical Exam:    General: Alert, oriented, well developed, no acute distress  HEENT/NECK:  PERRLA  No jugular venous distention      Cardiac:Regular rate and rhythm, II/VI harsh systolic murmur RUSB   Carotid arteries: 1+ pulses, delayed upstrokes, no bruit  Pulmonary:  Breath sounds clear bilaterally  Abdomen:  Non-tender, Non-distended  Positive bowel sounds  Upper extremities: 2+ radial pulses; brisk capillary refill; left hand dominant  Lower extremities: Extremities warm/dry  PT/DP pulses 1+ bilaterally  1+ edema B/L  Neuro: Alert and oriented X 3  Sensation is grossly intact  No focal deficits  Musculoskeletal: MAEE, stable gait  Skin: Warm/Dry, without rashes or lesions  Lab Results:   Lab Results   Component Value Date     12/21/2016    SODIUM 140 12/03/2021    K 3 7 12/03/2021     12/03/2021    CO2 27 12/03/2021    BUN 22 12/03/2021    CREATININE 0 91 12/03/2021    GLUC 108 (H) 12/03/2021    CALCIUM 9 4 12/21/2016    AST 32 12/03/2021    ALT 38 12/03/2021    ALKPHOS 108 12/21/2016    PROT 6 9 12/21/2016    TP 7 0 12/03/2021    BILITOT 1 2 12/21/2016    TBILI 1 5 (H) 12/03/2021     Lab Results   Component Value Date    HGBA1C 5 1 08/20/2020     Lab Results   Component Value Date    CKTOTAL 92 04/02/2019       Imaging Studies:     Echocardiogram: 2/8/22      Left Ventricle: Left ventricular cavity size is normal  Systolic function is normal (55%)  Wall motion cannot be accurately assessed  Diastolic function is mildly abnormal, consistent with grade I (abnormal) relaxation  There is concentric LV hypertrophy    Right Ventricle: Right ventricular cavity size is dilated  Systolic function is normal     Left Atrium: The atrium is mildly dilated    Right Atrium: The atrium is mildly dilated    Aortic Valve: The aortic valve is trileaflet  The leaflets are mildly thickened  The leaflets are mildly calcified  There is reduced mobility  There is severe stenosis  Peak and mean AV gradients were 64 and 44 mm Hg respectively  MICHELLE by the continuity equation method was 0 7 sq cm    Mitral Valve: There is mild annular calcification  There is trace regurgitation    Tricuspid Valve:  There is trace regurgitation      NM Myocardial perfusion: 2/9/22    1  No chest discomfort or EKG changes with Lexiacan  2  There is a moderate intensity small fixed inferobasal defect most consistent with inferobasal scar but could not r/o tissue attenuation  3  Inferobasal hypokinesis  Ejection fraction =58%  4  No evidence of ischemia     I have personally reviewed pertinent films in PACS     PCP and Cardiology notes reviewed       TAVR evaluation Assessment:     Zach Vera: III    5 Meter Walk: 6 sec, 6 sec, 7 sec    STS risk score (preliminary): 2 0%    KCCQ-12 completed    Assessment:  Patient Active Problem List    Diagnosis Date Noted    Gait instability 08/10/2016    Allergic rhinitis 03/21/2016    History of stroke 02/17/2016    Nonrheumatic aortic valve stenosis 08/28/2015    Hyperlipidemia 08/28/2015    Male erectile dysfunction 02/19/2015    BPH with obstruction/lower urinary tract symptoms 02/20/2014    Benign essential hypertension 01/20/2014    Insomnia 01/20/2014    Pulmonary nodule seen on imaging study 01/20/2014    Atherosclerotic heart disease of native coronary artery without angina pectoris 11/21/2013     Severe aortic stenosis; Ongoing TAVR workup    Plan:    Charles Chávez has symptomatic severe aortic stenosis  He will undergo the following testing for transcatheter aortic valve replacement: Gated CTA of the chest/abdomen/pelvis and carotid artery ultrasound  Cardiac cath not indicated as patient denies chest pain and recent NM Myocardial perfusion was negative for ischemia  Once these studies have been completed, Charles Chávez will follow up in our office to review the results and to be evaluated to confirm the suitability of proceeding with transcatheter aortic valve replacment  Charles Chávez was comfortable with our recommendations, and his questions were answered to his satisfaction          Routine referral to gastroenterology for colonoscopy screening was not indicated, as the patient is over 73 years old    SIGNATURE: MEGA Zamora  DATE: February 23, 2022  TIME: 10:23 AM  Attestation signed by Elba Degroot DO at 2/23/2022 10:57 AM:  The patient was seen and examined, and I agree with the midlevel's history, physical exam, assessment and plan with the following additions:    Eleanor Wu was seen in the office today for evaluation of his aortic stenosis  An echocardiogram was reviewed by myself personally and findings discussed with him  This demonstrates severe aortic stenosis  He has had a perfusion study recently that has not demonstrated any significant findings  He denies any angina  He has previously undergone CABG in the past     We discussed treatment options and I recommended TAVR  I feel this point there is not a strong indication for repeat cardiac catheterization  We discussed in detail the risks benefits and alternatives to TAVR    He consents to proceed with TAVR

## 2022-02-23 NOTE — PROGRESS NOTES
Consultation - Cardiothoracic Surgery   Fuentes Winston 64 y o  male MRN: [id number]    Physician Requesting Consult: Dr Kat Farrell    Reason for Consult / Principal Problem: Aortic stenosis, Non-Rheumatic    History of Present Illness: Trino Wallis is a 64y o  year old male who presents for initial outpatient surgical consultation for symptomatic severe aortic stenosis  Patient's PMHx is notable for CAD s/p CABG x 2 (LIMA-LAD, SVG-RCA; 2004), HTN, HLD, 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, arthritis, BPH and stable lung nodule  Patient followed by his cardiologist and PCP  Most recent Echo with progression of his AS to severe range  Upon interview patient admits to TAPIA, "slowing down" and  lightheadedness  He denies chest pain, palpitations, presyncope/syncope, weight gain, PND or orthopnea  He lives alone in a single story home and states he is having trouble keeping up with the cleaning  He states all his family live in [place] but states he has a friend who lives across the street from him  Patient is a [employer]  He quit smoking at age 19  He denies alcohol or drug use  He is edentulous with full dentures,  Covid immunizations: 2/9/21 & 3/9/21; booster: 11/17/21        Past Medical History:  Past Medical History:   Diagnosis Date    Arthritis     Cardiac disorder     Hypertension     Stroke [facility]cerebrum[facility] ([facility] [address] )          Past Surgical History:   Past Surgical History:   Procedure Laterality Date    CORONARY ARTERY BYPASS GRAFT  2004    x2 LIMA-LAD, SVG-RCA    TONSILLECTOMY           Family History:  Family History   Problem Relation Age of Onset    Coronary artery disease Mother     Arthritis Mother     Hypertension Mother     Stroke Mother     Other Father         aortic valve replacement; CABG    Arthritis Father     Hypertension Brother     Stroke Brother          Social History:    Social History     Substance and Sexual Activity   Alcohol Use No     Social History     Substance and Sexual Activity   Drug Use No     Social History     Tobacco Use   Smoking Status Former Smoker    Packs/day: 1 50    Years: 4 00    Pack years: 6 00    Types: Cigarettes    Quit date: [place] Years since quittin 1   Smokeless Tobacco Never Used   Tobacco Comment    former light tobacco smoker         Home Medications:   Prior to Admission medications    Medication Sig Start Date End Date Taking?  Authorizing Provider   amLODIPine (NORVASC) 5 mg tablet Take 1 tablet (5 mg total) by mouth daily 21  Yes Jakob Doss DO   aspirin (ASPIRIN LOW DOSE) 81 mg EC tablet Take 1 tablet by mouth daily 17  Yes Historical Provider, MD   atorvastatin (LIPITOR) 40 mg tablet Take 1 tablet (40 mg total) by mouth daily 21  Yes Jakob Doss DO   carvedilol (COREG) 6 25 mg tablet Take 1 tablet (6 25 mg total) by mouth 2 (two) times a day 21  Yes Jakob Doss DO   Cholecalciferol (VITAMIN D3) 5000 units CAPS Take by mouth daily  14  Yes Historical Provider, MD   clopidogrel (PLAVIX) 75 mg tablet Take 1 tablet (75 mg total) by mouth daily 21 Yes Jakob Doss DO   finasteride (PROSCAR) 5 mg tablet Take 1 tablet (5 mg total) by mouth daily 21  Yes Jakob Doss DO   mirtazapine (REMERON) 15 mg tablet Take 15 mg by mouth daily at bedtime   Yes Historical Provider, MD   olmesartan-hydrochlorothiazide (BENICAR HCT) 40-25 MG per tablet Take 1 tablet by mouth daily 21  Yes Johny Doss DO       Allergies:  No Known Allergies    Review of Systems:  Review of Systems - History obtained from chart review and the patient  General ROS: positive for  - fatigue and change in activity  negative for - chills, fever, sleep disturbance or weight gain  Psychological ROS: negative  Ophthalmic ROS: positive for - decreased vision  ENT ROS: edentulous; full dentures  Allergy and Immunology ROS: negative  Hematological and Lymphatic ROS: negative for - bleeding problems, blood clots, bruising or jaundice  Endocrine ROS: negative  Breast ROS: negative  Respiratory ROS: no cough, shortness of breath, or wheezing  Cardiovascular ROS: positive for - dyspnea on exertion and murmur  negative for - chest pain, irregular heartbeat, loss of consciousness, orthopnea, palpitations or paroxysmal nocturnal dyspnea  Gastrointestinal ROS: no abdominal pain, change in bowel habits, or black or bloody stools  Genito-Urinary ROS: positive for - urinary frequency/urgency  negative for - dysuria or hematuria  Musculoskeletal ROS: positive for - gait disturbance and muscular weakness  Neurological ROS: positive for - lightheadedness, gait disturbance, impaired coordination/balance and weakness  negative for - confusion, memory loss, speech problems or tremors  Dermatological ROS: negative    Vital Signs:     Vitals:    02/23/22 0936 02/23/22 0941   BP: 160/90 156/96   BP Location: Left arm Right arm   Patient Position: Sitting Sitting   Cuff Size: Standard    Pulse: 66    Resp: 18    Temp: (!) 96 5 °F (35 8 °C)    TempSrc: Tympanic    SpO2: 97%    Weight: 107 kg (236 lb)    Height: 6' (1 829 m)        Physical Exam:    General: Alert, oriented, well developed, no acute distress  HEENT/NECK:  PERRLA  No jugular venous distention  Cardiac:Regular rate and rhythm, II/VI harsh systolic murmur RUSB   Carotid arteries: 1+ pulses, delayed upstrokes, no bruit  Pulmonary:  Breath sounds clear bilaterally  Abdomen:  Non-tender, Non-distended  Positive bowel sounds  Upper extremities: 2+ radial pulses; brisk capillary refill; left hand dominant  Lower extremities: Extremities warm/dry  PT/DP pulses 1+ bilaterally  1+ edema B/L  Neuro: Alert and oriented X 3  Sensation is grossly intact  No focal deficits  Musculoskeletal: MAEE, stable gait  Skin: Warm/Dry, without rashes or lesions        Lab Results:   Lab Results   Component Value Date     12/21/2016    SODIUM 140 12/03/2021    K 3 7 12/03/2021    CL 101 12/03/2021    CO2 27 12/03/2021    BUN 22 12/03/2021    CREATININE 0 91 12/03/2021    GLUC 108 (H) 12/03/2021    CALCIUM 9 4 12/21/2016    AST 32 12/03/2021    ALT 38 12/03/2021    ALKPHOS 108 12/21/2016    PROT 6 9 12/21/2016    TP 7 0 12/03/2021    BILITOT 1 2 12/21/2016    TBILI 1 5 (H) 12/03/2021     Lab Results   Component Value Date    HGBA1C 5 1 08/20/2020     Lab Results   Component Value Date    CKTOTAL 92 04/02/2019       Imaging Studies:     Echocardiogram: 2/8/22      Left Ventricle: Left ventricular cavity size is normal  Systolic function is normal (55%)  Wall motion cannot be accurately assessed  Diastolic function is mildly abnormal, consistent with grade I (abnormal) relaxation  There is concentric LV hypertrophy    Right Ventricle: Right ventricular cavity size is dilated  Systolic function is normal     Left Atrium: The atrium is mildly dilated    Right Atrium: The atrium is mildly dilated    Aortic Valve: The aortic valve is trileaflet  The leaflets are mildly thickened  The leaflets are mildly calcified  There is reduced mobility  There is severe stenosis  Peak and mean AV gradients were 64 and 44 mm Hg respectively  MICHELLE by the continuity equation method was 0 7 sq cm    Mitral Valve: There is mild annular calcification  There is trace regurgitation    Tricuspid Valve: There is trace regurgitation      NM Myocardial perfusion: 2/9/22    1  No chest discomfort or EKG changes with Lexiacan  2  There is a moderate intensity small fixed inferobasal defect most consistent with inferobasal scar but could not r/o tissue attenuation  3  Inferobasal hypokinesis  Ejection fraction =58%    4  No evidence of ischemia     I have personally reviewed pertinent films in PACS     PCP and Cardiology notes reviewed       TAVR evaluation Assessment:     Samia Daft: III    5 Meter Walk: 6 sec, 6 sec, 7 sec    STS risk score (preliminary): 2 0%    KCCQ-12 completed    Assessment:  Patient Active Problem List    Diagnosis Date Noted    Gait instability 08/10/2016    Allergic rhinitis 03/21/2016    History of stroke 02/17/2016    Nonrheumatic aortic valve stenosis 08/28/2015    Hyperlipidemia 08/28/2015    Male erectile dysfunction 02/19/2015    BPH with obstruction/lower urinary tract symptoms 02/20/2014    Benign essential hypertension 01/20/2014    Insomnia 01/20/2014    Pulmonary nodule seen on imaging study 01/20/2014    Atherosclerotic heart disease of native coronary artery without angina pectoris 11/21/2013     Severe aortic stenosis; Ongoing TAVR workup    Plan:    Noa Rodgers has symptomatic severe aortic stenosis  He will undergo the following testing for transcatheter aortic valve replacement: Gated CTA of the chest/abdomen/pelvis and carotid artery ultrasound  Cardiac cath not indicated as patient denies chest pain and recent NM Myocardial perfusion was negative for ischemia  Once these studies have been completed, Noa Rodgers will follow up in our office to review the results and to be evaluated to confirm the suitability of proceeding with transcatheter aortic valve replacment  Noa Rodgers was comfortable with our recommendations, and his questions were answered to his satisfaction          Routine referral to gastroenterology for colonoscopy screening was not indicated, as the patient is over 74years old    SIGNATURE: MEGA Galan  DATE: February 23, 2022  TIME: 10:23 AM

## 2022-03-03 LAB
ALBUMIN SERPL-MCNC: 4.3 G/DL (ref 3.7–4.7)
ALBUMIN/GLOB SERPL: 1.5 {RATIO} (ref 1.2–2.2)
ALP SERPL-CCNC: 115 IU/L (ref 44–121)
ALT SERPL-CCNC: 35 IU/L (ref 0–44)
AST SERPL-CCNC: 38 IU/L (ref 0–40)
BASOPHILS # BLD AUTO: 0.1 X10E3/UL (ref 0–0.2)
BASOPHILS NFR BLD AUTO: 1 %
BILIRUB SERPL-MCNC: 1.3 MG/DL (ref 0–1.2)
BUN SERPL-MCNC: 16 MG/DL (ref 8–27)
BUN/CREAT SERPL: 17 (ref 10–24)
CALCIUM SERPL-MCNC: 9.5 MG/DL (ref 8.6–10.2)
CHLORIDE SERPL-SCNC: 99 MMOL/L (ref 96–106)
CO2 SERPL-SCNC: 24 MMOL/L (ref 20–29)
CREAT SERPL-MCNC: 0.96 MG/DL (ref 0.76–1.27)
EGFR: 81 ML/MIN/1.73
EOSINOPHIL # BLD AUTO: 0.1 X10E3/UL (ref 0–0.4)
EOSINOPHIL NFR BLD AUTO: 1 %
ERYTHROCYTE [DISTWIDTH] IN BLOOD BY AUTOMATED COUNT: 12.2 % (ref 11.6–15.4)
GLOBULIN SER-MCNC: 2.8 G/DL (ref 1.5–4.5)
GLUCOSE SERPL-MCNC: 113 MG/DL (ref 65–99)
HCT VFR BLD AUTO: 45.5 % (ref 37.5–51)
HGB BLD-MCNC: 16 G/DL (ref 13–17.7)
IMM GRANULOCYTES # BLD: 0 X10E3/UL (ref 0–0.1)
IMM GRANULOCYTES NFR BLD: 0 %
LYMPHOCYTES # BLD AUTO: 1.5 X10E3/UL (ref 0.7–3.1)
LYMPHOCYTES NFR BLD AUTO: 21 %
MCH RBC QN AUTO: 32.6 PG (ref 26.6–33)
MCHC RBC AUTO-ENTMCNC: 35.2 G/DL (ref 31.5–35.7)
MCV RBC AUTO: 93 FL (ref 79–97)
MONOCYTES # BLD AUTO: 0.7 X10E3/UL (ref 0.1–0.9)
MONOCYTES NFR BLD AUTO: 10 %
NEUTROPHILS # BLD AUTO: 4.9 X10E3/UL (ref 1.4–7)
NEUTROPHILS NFR BLD AUTO: 67 %
PLATELET # BLD AUTO: 224 X10E3/UL (ref 150–450)
POTASSIUM SERPL-SCNC: 3.9 MMOL/L (ref 3.5–5.2)
PROT SERPL-MCNC: 7.1 G/DL (ref 6–8.5)
RBC # BLD AUTO: 4.91 X10E6/UL (ref 4.14–5.8)
SODIUM SERPL-SCNC: 139 MMOL/L (ref 134–144)
WBC # BLD AUTO: 7.3 X10E3/UL (ref 3.4–10.8)

## 2022-03-10 ENCOUNTER — HOSPITAL ENCOUNTER (OUTPATIENT)
Dept: VASCULAR ULTRASOUND | Facility: HOSPITAL | Age: 77
Discharge: HOME | End: 2022-03-10
Payer: COMMERCIAL

## 2022-03-10 ENCOUNTER — HOSPITAL ENCOUNTER (OUTPATIENT)
Dept: CT IMAGING | Facility: HOSPITAL | Age: 77
Discharge: HOME | End: 2022-03-10
Payer: COMMERCIAL

## 2022-03-10 DIAGNOSIS — I35.0 NONRHEUMATIC AORTIC VALVE STENOSIS: ICD-10-CM

## 2022-03-10 DIAGNOSIS — Z13.6 ENCOUNTER FOR SCREENING FOR STENOSIS OF CAROTID ARTERY: ICD-10-CM

## 2022-03-10 DIAGNOSIS — Z01.818 PRE-OP TESTING: ICD-10-CM

## 2022-03-10 PROCEDURE — 93880 EXTRACRANIAL BILAT STUDY: CPT | Performed by: SURGERY

## 2022-03-10 PROCEDURE — 74174 CTA ABD&PLVS W/CONTRAST: CPT

## 2022-03-10 PROCEDURE — G1004 CDSM NDSC: HCPCS

## 2022-03-10 PROCEDURE — 75572 CT HRT W/3D IMAGE: CPT

## 2022-03-10 PROCEDURE — 93880 EXTRACRANIAL BILAT STUDY: CPT

## 2022-03-10 RX ADMIN — IOHEXOL 120 ML: 350 INJECTION, SOLUTION INTRAVENOUS at 09:21

## 2022-03-28 ENCOUNTER — OFFICE VISIT (OUTPATIENT)
Dept: CARDIAC SURGERY | Facility: CLINIC | Age: 77
End: 2022-03-28
Payer: COMMERCIAL

## 2022-03-28 VITALS
HEIGHT: 72 IN | OXYGEN SATURATION: 97 % | SYSTOLIC BLOOD PRESSURE: 178 MMHG | WEIGHT: 238.8 LBS | BODY MASS INDEX: 32.34 KG/M2 | HEART RATE: 67 BPM | RESPIRATION RATE: 18 BRPM | DIASTOLIC BLOOD PRESSURE: 94 MMHG

## 2022-03-28 DIAGNOSIS — Z01.812 ENCOUNTER FOR PRE-OPERATIVE LABORATORY TESTING: ICD-10-CM

## 2022-03-28 DIAGNOSIS — I35.0 NONRHEUMATIC AORTIC VALVE STENOSIS: Primary | ICD-10-CM

## 2022-03-28 PROCEDURE — 1036F TOBACCO NON-USER: CPT | Performed by: NURSE PRACTITIONER

## 2022-03-28 PROCEDURE — 99215 OFFICE O/P EST HI 40 MIN: CPT | Performed by: NURSE PRACTITIONER

## 2022-03-28 PROCEDURE — 1160F RVW MEDS BY RX/DR IN RCRD: CPT | Performed by: NURSE PRACTITIONER

## 2022-03-28 PROCEDURE — 3077F SYST BP >= 140 MM HG: CPT | Performed by: NURSE PRACTITIONER

## 2022-03-28 RX ORDER — CEFAZOLIN SODIUM 2 G/50ML
2000 SOLUTION INTRAVENOUS ONCE
Status: CANCELLED | OUTPATIENT
Start: 2022-03-28 | End: 2022-03-28

## 2022-03-28 RX ORDER — CHLORHEXIDINE GLUCONATE 0.12 MG/ML
15 RINSE ORAL ONCE
Status: CANCELLED | OUTPATIENT
Start: 2022-03-28 | End: 2022-03-28

## 2022-03-28 NOTE — PROGRESS NOTES
Pre-Op History & Physical - Cardiothoracic Surgery   Galina Valerio 64 y o  male MRN: [id number]    Physician Requesting Consult: Dr Desmond Pedroza    Reason for Consult / Principal Problem: Aortic stenosis, Non-Rheumatic    History of Present Illness: Galina Valerio is a 64y o  year old male who was previously evaluated in our office by THOMAS Hernandez  for transcatheter aortic valve replacement  During this initial consultation, arrangements were made for the following studies to be completed: Gated CTA of the chest/abdomen/pelvis,  dental clearance and carotid artery ultrasound  Galina Valerio now presents to review the results of these tests and obtain a second surgeon to confirm the suitability of proceeding with transcatheter aortic valve replacment  In review, Galina Valerio is a 64y o  year old male with a PMHx  notable for CAD s/p CABG x 2 (LIMA-LAD, SVG-RCA; 2004), HTN, HLD, 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, arthritis, BPH and stable lung nodule  Patient followed by his cardiologist and PCP  Most recent Echo with progression of his AS to severe range       Upon interview patient admits to TAPIA, "slowing down" and  lightheadedness  More recently he has noted transient heart fluttering that lasts ~ 10-15 seconds and is not associated with any other symptoms  He denies chest pain, rapid heart beat, presyncope/syncope, weight gain, PND or orthopnea  He lives alone in a single story home and states he is having trouble keeping up with the cleaning  He states all of his family live in [place] but states he has a friend who lives across the street from him that can lend him some help  Patient states his son is planning to travel her for the week of his surgery  Patient is a [employer]  He quit smoking at age 19   He denies alcohol or drug use  He is edentulous with full dentures,  Covid immunizations: 2/9/21 & 3/9/21; booster: 11/17/21    Past Medical History:  Past Medical History:   Diagnosis Date    Arthritis     Cardiac disorder     Hypertension     Stroke (cerebrum) ([address] )          Past Surgical History:   Past Surgical History:   Procedure Laterality Date    CORONARY ARTERY BYPASS GRAFT  2004    x2 LIMA-LAD, SVG-RCA    TONSILLECTOMY           Family History:  Family History   Problem Relation Age of Onset    Coronary artery disease Mother     Arthritis Mother     Hypertension Mother     Stroke Mother     Other Father         aortic valve replacement; CABG    Arthritis Father     Hypertension Brother     Stroke Brother          Social History:    Social History     Substance and Sexual Activity   Alcohol Use No     Social History     Substance and Sexual Activity   Drug Use No     Social History     Tobacco Use   Smoking Status Former Smoker    Packs/day: 1 50    Years: 4 00    Pack years: 6 00    Types: Cigarettes    Quit date: aRni Almendarez Years since quittin 2   Smokeless Tobacco Never Used   Tobacco Comment    former light tobacco smoker       Home Medications:   Prior to Admission medications    Medication Sig Start Date End Date Taking?  Authorizing Provider   amLODIPine (NORVASC) 5 mg tablet Take 1 tablet (5 mg total) by mouth daily 21  Yes Jakob Doss DO   aspirin (ASPIRIN LOW DOSE) 81 mg EC tablet Take 1 tablet by mouth daily 17  Yes Historical Provider, MD   atorvastatin (LIPITOR) 40 mg tablet Take 1 tablet (40 mg total) by mouth daily 21  Yes Jakob Doss DO   carvedilol (COREG) 6 25 mg tablet Take 1 tablet (6 25 mg total) by mouth 2 (two) times a day 21  Yes Jakob Doss DO   Cholecalciferol (VITAMIN D3) 5000 units CAPS Take by mouth daily  14  Yes Historical Provider, MD   clopidogrel (PLAVIX) 75 mg tablet Take 1 tablet (75 mg total) by mouth daily 11/30/21 3/28/22 Yes Jakob Doss DO   finasteride (PROSCAR) 5 mg tablet Take 1 tablet (5 mg total) by mouth daily 21  Yes Jakob Doss,    mirtazapine (REMERON) 15 mg tablet Take 15 mg by mouth daily at bedtime   Yes Historical Provider, MD   olmesartan-hydrochlorothiazide (BENICAR HCT) 40-25 MG per tablet Take 1 tablet by mouth daily 8/19/21  Yes Jakob Doss DO   mupirocin (BACTROBAN) 2 % ointment Apply 1 application topically 2 (two) times a day for 5 days Apply to each nostril twice daily for five days before your operation   3/28/22 4/2/22  MEGA Pugh       Allergies:  No Known Allergies    Review of Systems:  Review of Systems - History obtained from chart review and the patient  General ROS: positive for  - fatigue and change in activity tolerance   negative for - chills or fever  Psychological ROS: negative  Ophthalmic ROS: positive for - decreased vision  ENT ROS: edentulous, full dentures  Allergy and Immunology ROS: negative  Hematological and Lymphatic ROS: negative for - bleeding problems, blood clots, bruising or jaundice  Endocrine ROS: negative  Breast ROS: negative  Respiratory ROS: no cough, shortness of breath, or wheezing  Cardiovascular ROS: positive for - dyspnea on exertion, murmur and palpitations  negative for - chest pain, edema, irregular heartbeat, loss of consciousness, orthopnea, paroxysmal nocturnal dyspnea or rapid heart rate  Gastrointestinal ROS: no abdominal pain, change in bowel habits, or black or bloody stools  Genito-Urinary ROS: positive for - urinary frequency/urgency  negative for - dysuria or hematuria  Musculoskeletal ROS: positive for - gait disturbance and muscular weakness  Neurological ROS: positive for - dizziness, gait disturbance, impaired coordination/balance and weakness  Dermatological ROS: negative    Vital Signs:     Vitals:    03/28/22 1257 03/28/22 1259   BP: (!) 189/91 (!) 178/94   BP Location: Left arm Right arm   Patient Position: Sitting Sitting   Cuff Size: Standard    Pulse: 67    Resp: 18    SpO2: 97%    Weight: 108 kg (238 lb 12 8 oz)    Height: 6' (1 829 m)        Physical Exam:    General: Alert, oriented, well developed, no acute distress  HEENT/NECK:  PERRLA  No jugular venous distention  Cardiac:Regular rate and rhythm, II-III/VI harsh systolic murmur RUSB  Carotids: 1+ pulses, delayed upstrokes, no bruits   Pulmonary:  Breath sounds clear bilaterally  Abdomen:  Non-tender, Non-distended  Positive bowel sounds  Upper extremities: 1+ radial pulses; brisk capillary refill; hands cool  Lower extremities: Extremities warm/dry  PT/DP pulses 1+ bilaterally  1+ edema B/L  Neuro: Alert and oriented X 3  Sensation is grossly intact  No focal deficits  Musculoskeletal: MAEE, stable gait  Skin: Warm/Dry, without rashes or lesions  Lab Results:   Lab Results   Component Value Date     12/21/2016    SODIUM 139 03/02/2022    K 3 9 03/02/2022    CL 99 03/02/2022    CO2 24 03/02/2022    BUN 16 03/02/2022    CREATININE 0 96 03/02/2022    GLUC 113 (H) 03/02/2022    CALCIUM 9 4 12/21/2016    AST 38 03/02/2022    ALT 35 03/02/2022    ALKPHOS 108 12/21/2016    PROT 6 9 12/21/2016    TP 7 1 03/02/2022    BILITOT 1 2 12/21/2016    TBILI 1 3 (H) 03/02/2022    EGFR 81 03/02/2022     Lab Results   Component Value Date    WBC 7 3 03/02/2022    HGB 16 0 03/02/2022    HCT 45 5 03/02/2022    MCV 93 03/02/2022     03/02/2022     Lab Results   Component Value Date    HGBA1C 5 1 08/20/2020     Lab Results   Component Value Date    CKTOTAL 92 04/02/2019       Imaging Studies:     Echocardiogram: 2/8/22      Left Ventricle: Left ventricular cavity size is normal  Systolic function is normal (55%)  Wall motion cannot be accurately assessed  Diastolic function is mildly abnormal, consistent with grade I (abnormal) relaxation  There is concentric LV hypertrophy    Right Ventricle: Right ventricular cavity size is dilated  Systolic function is normal     Left Atrium: The atrium is mildly dilated    Right Atrium: The atrium is mildly dilated    Aortic Valve:  The aortic valve is trileaflet  The leaflets are mildly thickened  The leaflets are mildly calcified  There is reduced mobility  There is severe stenosis  Peak and mean AV gradients were 64 and 44 mm Hg respectively  MICHELLE by the continuity equation method was 0 7 sq cm    Mitral Valve: There is mild annular calcification  There is trace regurgitation    Tricuspid Valve:  There is trace regurgitation      Left Ventricle Measurements    Function/Volumes   A4C EF 54 %      LVOT stroke volume 73 15 cm3      LVOT SI 33 ml/m2      Dimensions   LVIDd 4 cm      LVIDS 2 8 cm      IVSd 1 4 cm      LVPWd 1 cm      LVOT area 3 46 cm2      FS 30 %      Diastolic Filling   MV E' Tissue Velocity Septal 6 cm/s      E wave deceleration time 317 ms      MV Peak E Ryder 54 cm/s      MV Peak A Ryder 0 88 m/s       Report Measurements   AV LVOT peak gradient 3 mmHg           Interventricular Septum Measurements    Shunt Ratio   LVOT peak VTI 21 13 cm      LVOT peak ryder 0 79 m/s           Right Ventricle Measurements    Dimensions   RVID d 4 7 cm            Left Atrium Measurements    Dimensions   LA size 4 9 cm      RYANNE A4C 21 4 cm2            Right Atrium Measurements    Dimensions   RAA A4C 22 3 cm2            Atrial Septum Measurements    Shunt Ratio   LVOT peak VTI 21 13 cm      LVOT peak ryder 0 79 m/s            Aortic Valve Measurements    Stenosis   Ao peak ryder retrograde 3 7 m/s      LVOT peak ryder 0 79 m/s      Ao  31 cm      LVOT peak VTI 21 13 cm      AV mean gradient 44 mmHg      LVOT mn grad 2 mmHg      AV peak gradient 64 mmHg      AV LVOT peak gradient 3 mmHg      AV Velocity Ratio 0 21       Area/Dimensions   AV valve area 0 69 cm2      AV area by cont VTI 0 7 cm2      AV area peak ryder 0 7 cm2      LVOT diameter 2 1 cm      LVOT area 3 46 cm2            Mitral Valve Measurements    Stenosis   MV stenosis pressure 1/2 time 0 ms            Aorta Measurements    Aortic Dimensions   Ao root 3 1 cm             Gated CTA of the chest/abdomen/pelvis: 3/10/22  TAVR measurements:    Annulus: diameter 30 2 x 21 8 mm      area: 497 5 sq mm    Annulus to LCA: 17 5 mm    Annulus to RCA: 18 6 mm    Minimal diameter right iliofemoral segment: 8 4 mm    Minimal diameter left iliofemoral segment: 9 0 mm  2  No significant aortoiliac occlusive disease  3  Stable 2 cm left lower lobe lung mass unchanged since at least 2014 most likely to be a hamartoma  4  Cholelithiasis       NM Myocardial Perfusion scan: 2/9/22    1  No chest discomfort or EKG changes with Lexiacan  2  There is a moderate intensity small fixed inferobasal defect most consistent with inferobasal scar but could not r/o tissue attenuation  3  Inferobasal hypokinesis  Ejection fraction =58%  4  No evidence of ischemia       Carotid artery ultrasound: 3/10/22  RIGHT:  There is <50% stenosis noted in the internal carotid artery  Plaque is  homogenous and smooth  Vertebral artery flow is antegrade  There is no significant subclavian artery  disease  LEFT:  There is <50% stenosis noted in the internal carotid artery  Plaque is  homogenous and smooth  Vertebral artery flow is antegrade  There is no significant subclavian artery  Disease      I have personally reviewed pertinent films in PACS     PCP and Cardiology notes reviewed       TAVR evaluation Assessment:     5 Meter Walk Test:      Attempt 1: 6 sec   Attempt 2: 6 sec   Attempt 3: 7 sec    STS Risk Score: 2 0%    [address]: III    KCCQ-12 completed    Assessment:  Patient Active Problem List    Diagnosis Date Noted    Gait instability 08/10/2016    Allergic rhinitis 03/21/2016    History of stroke 02/17/2016    Nonrheumatic aortic valve stenosis 08/28/2015    Hyperlipidemia 08/28/2015    Male erectile dysfunction 02/19/2015    BPH with obstruction/lower urinary tract symptoms 02/20/2014    Benign essential hypertension 01/20/2014    Insomnia 01/20/2014    Pulmonary nodule seen on imaging study 01/20/2014    Atherosclerotic heart disease of native coronary artery without angina pectoris 11/21/2013     Severe aortic stenosis; TAVR workup completed    Plan:    Yasemin Hernandez has symptomatic severe aortic stenosis  They have undergone testing for transcatheter aortic valve replacement  The results of these studies have been interpreted by the multidisciplinary TAVR team who have determined the patient to be Intermediate risk for open aortic valve replacement based on other pre-existing comobidities not reflected in the STS risk score  The risks, benefits, and alternatives to TAVR were discussed in detail with the patient today  They understand and wish to proceed with transfemoral transcatheter aortic valve replacement  Informed consent was obtained and a date for the intervention has been set  Yasemin Hernandez was comfortable with our recommendations, and their questions were answered to their satisfaction  The following preoperative instructions were provided at the conclusion of their consultation:     1  You will receive a phone call from the hospital between 2:00 PM and 8:00 PM the day prior to surgery to confirm arrival time and location  For surgery on Mondays, you will receive a call on Friday  2  Do not drink or eat anything after midnight the night before surgery  That includes no water, candy, gum, lozenges, Lifesavers, etc  We recommend you not eat any salty or fatty snack food, consume alcohol or smoke the night before surgery  3  Continue taking aspirin but only 81 mg daily  4  If you take Coumadin and/or Plavix, discontinue it 5 days before surgery  5  If you are diabetic, do not take any of your diabetic pills the morning of surgery  If you take Lantus insulin, you may take it at your regularly scheduled time the day before surgery  Do not take any other insulins the morning of surgery    6  The 2 nights before surgery, take a shower each night using the special antiseptic soap or soap sponges you received from the office or hospital  Sherren Dianelys your hair with regular shampoo and rinse completely before using the antiseptic sponges  Use the sponge to wash from your neck down, with special attention to the armpits and groin area  Do not use any other soap or cleanser on your skin  Do not use lotions, powder, deodorant or perfume of any kind on your skin after you shower  Use clean bed linens and wear clean pajamas after your shower  7  You will be prescribed Mupirocin nasal ointment  Apply to both nostrils twice a day for 5 days prior to surgery  8  Do not take a shower the morning of her surgery; you'll be given a special" bath" at the hospital   9  Notify the CT surgery office if you develop a cold, sore throat, cough, fever or other health issues before your surgery    10  Other medication changes included the following:   CONTINUE PLAVIX  [address]  HTN     SIGNATURE: MEGA Shahid  DATE: March 28, 2022  TIME: 1:18 PM

## 2022-03-28 NOTE — H&P (VIEW-ONLY)
Pre-Op History & Physical - Cardiothoracic Surgery   Alexis Francois 64 y o  male MRN: [id number]    Physician Requesting Consult: Dr José Garland    Reason for Consult / Principal Problem: Aortic stenosis, Non-Rheumatic    History of Present Illness: Alexis Francois is a 64y o  year old male who was previously evaluated in our office by THOMAS Black  for transcatheter aortic valve replacement  During this initial consultation, arrangements were made for the following studies to be completed: Gated CTA of the chest/abdomen/pelvis,  dental clearance and carotid artery ultrasound  Alexis Francois now presents to review the results of these tests and obtain a second surgeon to confirm the suitability of proceeding with transcatheter aortic valve replacment  In review, Alexis Francois is a 64y o  year old male with a PMHx  notable for CAD s/p CABG x 2 (LIMA-LAD, SVG-RCA; 2004), HTN, HLD, 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, arthritis, BPH and stable lung nodule  Patient followed by his cardiologist and PCP  Most recent Echo with progression of his AS to severe range       Upon interview patient admits to TAPIA, "slowing down" and  lightheadedness  More recently he has noted transient heart fluttering that lasts ~ 10-15 seconds and is not associated with any other symptoms  He denies chest pain, rapid heart beat, presyncope/syncope, weight gain, PND or orthopnea  He lives alone in a single story home and states he is having trouble keeping up with the cleaning  He states all of his family live in [place] but states he has a friend who lives across the street from him that can lend him some help  Patient states his son is planning to travel her for the week of his surgery  Patient is a [employer]  He quit smoking at age 19   He denies alcohol or drug use  He is edentulous with full dentures,  Covid immunizations: 2/9/21 & 3/9/21; booster: 11/17/21    Past Medical History:  Past Medical History:   Diagnosis Date    Arthritis     Cardiac disorder     Hypertension     Stroke (cerebrum) ([address] )          Past Surgical History:   Past Surgical History:   Procedure Laterality Date    CORONARY ARTERY BYPASS GRAFT  2004    x2 LIMA-LAD, SVG-RCA    TONSILLECTOMY           Family History:  Family History   Problem Relation Age of Onset    Coronary artery disease Mother     Arthritis Mother     Hypertension Mother     Stroke Mother     Other Father         aortic valve replacement; CABG    Arthritis Father     Hypertension Brother     Stroke Brother          Social History:    Social History     Substance and Sexual Activity   Alcohol Use No     Social History     Substance and Sexual Activity   Drug Use No     Social History     Tobacco Use   Smoking Status Former Smoker    Packs/day: 1 50    Years: 4 00    Pack years: 6 00    Types: Cigarettes    Quit date: Yaya Jodie Years since quittin 2   Smokeless Tobacco Never Used   Tobacco Comment    former light tobacco smoker       Home Medications:   Prior to Admission medications    Medication Sig Start Date End Date Taking?  Authorizing Provider   amLODIPine (NORVASC) 5 mg tablet Take 1 tablet (5 mg total) by mouth daily 21  Yes Jakob Doss DO   aspirin (ASPIRIN LOW DOSE) 81 mg EC tablet Take 1 tablet by mouth daily 17  Yes Historical Provider, MD   atorvastatin (LIPITOR) 40 mg tablet Take 1 tablet (40 mg total) by mouth daily 21  Yes Jakob Doss DO   carvedilol (COREG) 6 25 mg tablet Take 1 tablet (6 25 mg total) by mouth 2 (two) times a day 21  Yes Jakob Doss DO   Cholecalciferol (VITAMIN D3) 5000 units CAPS Take by mouth daily  14  Yes Historical Provider, MD   clopidogrel (PLAVIX) 75 mg tablet Take 1 tablet (75 mg total) by mouth daily 11/30/21 3/28/22 Yes Jakob Doss DO   finasteride (PROSCAR) 5 mg tablet Take 1 tablet (5 mg total) by mouth daily 21  Yes Jakob Doss,    mirtazapine (REMERON) 15 mg tablet Take 15 mg by mouth daily at bedtime   Yes Historical Provider, MD   olmesartan-hydrochlorothiazide (BENICAR HCT) 40-25 MG per tablet Take 1 tablet by mouth daily 8/19/21  Yes Jakob Doss DO   mupirocin (BACTROBAN) 2 % ointment Apply 1 application topically 2 (two) times a day for 5 days Apply to each nostril twice daily for five days before your operation   3/28/22 4/2/22  MEGA Arango       Allergies:  No Known Allergies    Review of Systems:  Review of Systems - History obtained from chart review and the patient  General ROS: positive for  - fatigue and change in activity tolerance   negative for - chills or fever  Psychological ROS: negative  Ophthalmic ROS: positive for - decreased vision  ENT ROS: edentulous, full dentures  Allergy and Immunology ROS: negative  Hematological and Lymphatic ROS: negative for - bleeding problems, blood clots, bruising or jaundice  Endocrine ROS: negative  Breast ROS: negative  Respiratory ROS: no cough, shortness of breath, or wheezing  Cardiovascular ROS: positive for - dyspnea on exertion, murmur and palpitations  negative for - chest pain, edema, irregular heartbeat, loss of consciousness, orthopnea, paroxysmal nocturnal dyspnea or rapid heart rate  Gastrointestinal ROS: no abdominal pain, change in bowel habits, or black or bloody stools  Genito-Urinary ROS: positive for - urinary frequency/urgency  negative for - dysuria or hematuria  Musculoskeletal ROS: positive for - gait disturbance and muscular weakness  Neurological ROS: positive for - dizziness, gait disturbance, impaired coordination/balance and weakness  Dermatological ROS: negative    Vital Signs:     Vitals:    03/28/22 1257 03/28/22 1259   BP: (!) 189/91 (!) 178/94   BP Location: Left arm Right arm   Patient Position: Sitting Sitting   Cuff Size: Standard    Pulse: 67    Resp: 18    SpO2: 97%    Weight: 108 kg (238 lb 12 8 oz)    Height: 6' (1 829 m)        Physical Exam:    General: Alert, oriented, well developed, no acute distress  HEENT/NECK:  PERRLA  No jugular venous distention  Cardiac:Regular rate and rhythm, II-III/VI harsh systolic murmur RUSB  Carotids: 1+ pulses, delayed upstrokes, no bruits   Pulmonary:  Breath sounds clear bilaterally  Abdomen:  Non-tender, Non-distended  Positive bowel sounds  Upper extremities: 1+ radial pulses; brisk capillary refill; hands cool  Lower extremities: Extremities warm/dry  PT/DP pulses 1+ bilaterally  1+ edema B/L  Neuro: Alert and oriented X 3  Sensation is grossly intact  No focal deficits  Musculoskeletal: MAEE, stable gait  Skin: Warm/Dry, without rashes or lesions  Lab Results:   Lab Results   Component Value Date     12/21/2016    SODIUM 139 03/02/2022    K 3 9 03/02/2022    CL 99 03/02/2022    CO2 24 03/02/2022    BUN 16 03/02/2022    CREATININE 0 96 03/02/2022    GLUC 113 (H) 03/02/2022    CALCIUM 9 4 12/21/2016    AST 38 03/02/2022    ALT 35 03/02/2022    ALKPHOS 108 12/21/2016    PROT 6 9 12/21/2016    TP 7 1 03/02/2022    BILITOT 1 2 12/21/2016    TBILI 1 3 (H) 03/02/2022    EGFR 81 03/02/2022     Lab Results   Component Value Date    WBC 7 3 03/02/2022    HGB 16 0 03/02/2022    HCT 45 5 03/02/2022    MCV 93 03/02/2022     03/02/2022     Lab Results   Component Value Date    HGBA1C 5 1 08/20/2020     Lab Results   Component Value Date    CKTOTAL 92 04/02/2019       Imaging Studies:     Echocardiogram: 2/8/22      Left Ventricle: Left ventricular cavity size is normal  Systolic function is normal (55%)  Wall motion cannot be accurately assessed  Diastolic function is mildly abnormal, consistent with grade I (abnormal) relaxation  There is concentric LV hypertrophy    Right Ventricle: Right ventricular cavity size is dilated  Systolic function is normal     Left Atrium: The atrium is mildly dilated    Right Atrium: The atrium is mildly dilated    Aortic Valve:  The aortic valve is trileaflet  The leaflets are mildly thickened  The leaflets are mildly calcified  There is reduced mobility  There is severe stenosis  Peak and mean AV gradients were 64 and 44 mm Hg respectively  MICHELLE by the continuity equation method was 0 7 sq cm    Mitral Valve: There is mild annular calcification  There is trace regurgitation    Tricuspid Valve:  There is trace regurgitation      Left Ventricle Measurements    Function/Volumes   A4C EF 54 %      LVOT stroke volume 73 15 cm3      LVOT SI 33 ml/m2      Dimensions   LVIDd 4 cm      LVIDS 2 8 cm      IVSd 1 4 cm      LVPWd 1 cm      LVOT area 3 46 cm2      FS 30 %      Diastolic Filling   MV E' Tissue Velocity Septal 6 cm/s      E wave deceleration time 317 ms      MV Peak E Ryder 54 cm/s      MV Peak A Ryder 0 88 m/s       Report Measurements   AV LVOT peak gradient 3 mmHg           Interventricular Septum Measurements    Shunt Ratio   LVOT peak VTI 21 13 cm      LVOT peak ryder 0 79 m/s           Right Ventricle Measurements    Dimensions   RVID d 4 7 cm            Left Atrium Measurements    Dimensions   LA size 4 9 cm      RYANNE A4C 21 4 cm2            Right Atrium Measurements    Dimensions   RAA A4C 22 3 cm2            Atrial Septum Measurements    Shunt Ratio   LVOT peak VTI 21 13 cm      LVOT peak ryder 0 79 m/s            Aortic Valve Measurements    Stenosis   Ao peak ryder retrograde 3 7 m/s      LVOT peak ryder 0 79 m/s      Ao  31 cm      LVOT peak VTI 21 13 cm      AV mean gradient 44 mmHg      LVOT mn grad 2 mmHg      AV peak gradient 64 mmHg      AV LVOT peak gradient 3 mmHg      AV Velocity Ratio 0 21       Area/Dimensions   AV valve area 0 69 cm2      AV area by cont VTI 0 7 cm2      AV area peak ryder 0 7 cm2      LVOT diameter 2 1 cm      LVOT area 3 46 cm2            Mitral Valve Measurements    Stenosis   MV stenosis pressure 1/2 time 0 ms            Aorta Measurements    Aortic Dimensions   Ao root 3 1 cm             Gated CTA of the chest/abdomen/pelvis: 3/10/22  TAVR measurements:    Annulus: diameter 30 2 x 21 8 mm      area: 497 5 sq mm    Annulus to LCA: 17 5 mm    Annulus to RCA: 18 6 mm    Minimal diameter right iliofemoral segment: 8 4 mm    Minimal diameter left iliofemoral segment: 9 0 mm  2  No significant aortoiliac occlusive disease  3  Stable 2 cm left lower lobe lung mass unchanged since at least 2014 most likely to be a hamartoma  4  Cholelithiasis       NM Myocardial Perfusion scan: 2/9/22    1  No chest discomfort or EKG changes with Lexiacan  2  There is a moderate intensity small fixed inferobasal defect most consistent with inferobasal scar but could not r/o tissue attenuation  3  Inferobasal hypokinesis  Ejection fraction =58%  4  No evidence of ischemia       Carotid artery ultrasound: 3/10/22  RIGHT:  There is <50% stenosis noted in the internal carotid artery  Plaque is  homogenous and smooth  Vertebral artery flow is antegrade  There is no significant subclavian artery  disease  LEFT:  There is <50% stenosis noted in the internal carotid artery  Plaque is  homogenous and smooth  Vertebral artery flow is antegrade  There is no significant subclavian artery  Disease      I have personally reviewed pertinent films in PACS     PCP and Cardiology notes reviewed       TAVR evaluation Assessment:     5 Meter Walk Test:      Attempt 1: 6 sec   Attempt 2: 6 sec   Attempt 3: 7 sec    STS Risk Score: 2 0%    [address]: III    KCCQ-12 completed    Assessment:  Patient Active Problem List    Diagnosis Date Noted    Gait instability 08/10/2016    Allergic rhinitis 03/21/2016    History of stroke 02/17/2016    Nonrheumatic aortic valve stenosis 08/28/2015    Hyperlipidemia 08/28/2015    Male erectile dysfunction 02/19/2015    BPH with obstruction/lower urinary tract symptoms 02/20/2014    Benign essential hypertension 01/20/2014    Insomnia 01/20/2014    Pulmonary nodule seen on imaging study 01/20/2014    Atherosclerotic heart disease of native coronary artery without angina pectoris 11/21/2013     Severe aortic stenosis; TAVR workup completed    Plan:    Yohana Arteaga has symptomatic severe aortic stenosis  They have undergone testing for transcatheter aortic valve replacement  The results of these studies have been interpreted by the multidisciplinary TAVR team who have determined the patient to be Intermediate risk for open aortic valve replacement based on other pre-existing comobidities not reflected in the STS risk score  The risks, benefits, and alternatives to TAVR were discussed in detail with the patient today  They understand and wish to proceed with transfemoral transcatheter aortic valve replacement  Informed consent was obtained and a date for the intervention has been set  Yohana Arteaga was comfortable with our recommendations, and their questions were answered to their satisfaction  The following preoperative instructions were provided at the conclusion of their consultation:     1  You will receive a phone call from the hospital between 2:00 PM and 8:00 PM the day prior to surgery to confirm arrival time and location  For surgery on Mondays, you will receive a call on Friday  2  Do not drink or eat anything after midnight the night before surgery  That includes no water, candy, gum, lozenges, Lifesavers, etc  We recommend you not eat any salty or fatty snack food, consume alcohol or smoke the night before surgery  3  Continue taking aspirin but only 81 mg daily  4  If you take Coumadin and/or Plavix, discontinue it 5 days before surgery  5  If you are diabetic, do not take any of your diabetic pills the morning of surgery  If you take Lantus insulin, you may take it at your regularly scheduled time the day before surgery  Do not take any other insulins the morning of surgery    6  The 2 nights before surgery, take a shower each night using the special antiseptic soap or soap sponges you received from the office or [facility] your hair with regular shampoo and rinse completely before using the antiseptic sponges  Use the sponge to wash from your neck down, with special attention to the armpits and groin area  Do not use any other soap or cleanser on your skin  Do not use lotions, powder, deodorant or perfume of any kind on your skin after you shower  Use clean bed linens and wear clean pajamas after your shower  7  You will be prescribed Mupirocin nasal ointment  Apply to both nostrils twice a day for 5 days prior to surgery  8  Do not take a shower the morning of her surgery; you'll be given a special" bath" at the hospital   9  Notify the CT surgery office if you develop a cold, sore throat, cough, fever or other health issues before your surgery    10  Other medication changes included the following:   CONTINUE PLAVIX  [address]  HTN     SIGNATURE: MEGA French  DATE: March 28, 2022  TIME: 1:18 PM

## 2022-03-28 NOTE — H&P
Pre-Op History & Physical - Cardiothoracic Surgery   Jessica Mo 64 y o  male MRN: [id number]    Physician Requesting Consult: Dr Miller Salguero    Reason for Consult / Principal Problem: Aortic stenosis, Non-Rheumatic    History of Present Illness: Jessica Mo is a 64y o  year old male who was previously evaluated in our office by THOMAS Harry  for transcatheter aortic valve replacement  During this initial consultation, arrangements were made for the following studies to be completed: Gated CTA of the chest/abdomen/pelvis,  dental clearance and carotid artery ultrasound  Jessica Mo now presents to review the results of these tests and obtain a second surgeon to confirm the suitability of proceeding with transcatheter aortic valve replacment  In review, Jessica Mo is a 64y o  year old male with a PMHx  notable for CAD s/p CABG x 2 (LIMA-LAD, SVG-RCA; 2004), HTN, HLD, 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, arthritis, BPH and stable lung nodule  Patient followed by his cardiologist and PCP  Most recent Echo with progression of his AS to severe range       Upon interview patient admits to TAPIA, "slowing down" and  lightheadedness  More recently he has noted transient heart fluttering that lasts ~ 10-15 seconds and is not associated with any other symptoms  He denies chest pain, rapid heart beat, presyncope/syncope, weight gain, PND or orthopnea  He lives alone in a single story home and states he is having trouble keeping up with the cleaning  He states all of his family live in [place] but states he has a friend who lives across the street from him that can lend him some help  Patient states his son is planning to travel her for the week of his surgery  Patient is a [employer]  He quit smoking at age 19   He denies alcohol or drug use  He is edentulous with full dentures,  Covid immunizations: 2/9/21 & 3/9/21; booster: 11/17/21    Past Medical History:  Past Medical History:   Diagnosis Date    Arthritis     Cardiac disorder     Hypertension     Stroke (cerebrum) ([address] )          Past Surgical History:   Past Surgical History:   Procedure Laterality Date    CORONARY ARTERY BYPASS GRAFT  2004    x2 LIMA-LAD, SVG-RCA    TONSILLECTOMY           Family History:  Family History   Problem Relation Age of Onset    Coronary artery disease Mother     Arthritis Mother     Hypertension Mother     Stroke Mother     Other Father         aortic valve replacement; CABG    Arthritis Father     Hypertension Brother     Stroke Brother          Social History:    Social History     Substance and Sexual Activity   Alcohol Use No     Social History     Substance and Sexual Activity   Drug Use No     Social History     Tobacco Use   Smoking Status Former Smoker    Packs/day: 1 50    Years: 4 00    Pack years: 6 00    Types: Cigarettes    Quit date: Jessica Hernandez Years since quittin 2   Smokeless Tobacco Never Used   Tobacco Comment    former light tobacco smoker       Home Medications:   Prior to Admission medications    Medication Sig Start Date End Date Taking?  Authorizing Provider   amLODIPine (NORVASC) 5 mg tablet Take 1 tablet (5 mg total) by mouth daily 21  Yes Jakob Doss DO   aspirin (ASPIRIN LOW DOSE) 81 mg EC tablet Take 1 tablet by mouth daily 17  Yes Historical Provider, MD   atorvastatin (LIPITOR) 40 mg tablet Take 1 tablet (40 mg total) by mouth daily 21  Yes Jakob Doss DO   carvedilol (COREG) 6 25 mg tablet Take 1 tablet (6 25 mg total) by mouth 2 (two) times a day 21  Yes Jakob Doss DO   Cholecalciferol (VITAMIN D3) 5000 units CAPS Take by mouth daily  14  Yes Historical Provider, MD   clopidogrel (PLAVIX) 75 mg tablet Take 1 tablet (75 mg total) by mouth daily 11/30/21 3/28/22 Yes Jakob Doss DO   finasteride (PROSCAR) 5 mg tablet Take 1 tablet (5 mg total) by mouth daily 21  Yes Jakob Doss,    mirtazapine (REMERON) 15 mg tablet Take 15 mg by mouth daily at bedtime   Yes Historical Provider, MD   olmesartan-hydrochlorothiazide (BENICAR HCT) 40-25 MG per tablet Take 1 tablet by mouth daily 8/19/21  Yes Jakob Doss DO   mupirocin (BACTROBAN) 2 % ointment Apply 1 application topically 2 (two) times a day for 5 days Apply to each nostril twice daily for five days before your operation   3/28/22 4/2/22  MEGA Newberry       Allergies:  No Known Allergies    Review of Systems:  Review of Systems - History obtained from chart review and the patient  General ROS: positive for  - fatigue and change in activity tolerance   negative for - chills or fever  Psychological ROS: negative  Ophthalmic ROS: positive for - decreased vision  ENT ROS: edentulous, full dentures  Allergy and Immunology ROS: negative  Hematological and Lymphatic ROS: negative for - bleeding problems, blood clots, bruising or jaundice  Endocrine ROS: negative  Breast ROS: negative  Respiratory ROS: no cough, shortness of breath, or wheezing  Cardiovascular ROS: positive for - dyspnea on exertion, murmur and palpitations  negative for - chest pain, edema, irregular heartbeat, loss of consciousness, orthopnea, paroxysmal nocturnal dyspnea or rapid heart rate  Gastrointestinal ROS: no abdominal pain, change in bowel habits, or black or bloody stools  Genito-Urinary ROS: positive for - urinary frequency/urgency  negative for - dysuria or hematuria  Musculoskeletal ROS: positive for - gait disturbance and muscular weakness  Neurological ROS: positive for - dizziness, gait disturbance, impaired coordination/balance and weakness  Dermatological ROS: negative    Vital Signs:     Vitals:    03/28/22 1257 03/28/22 1259   BP: (!) 189/91 (!) 178/94   BP Location: Left arm Right arm   Patient Position: Sitting Sitting   Cuff Size: Standard    Pulse: 67    Resp: 18    SpO2: 97%    Weight: 108 kg (238 lb 12 8 oz)    Height: 6' (1 829 m)        Physical Exam:    General: Alert, oriented, well developed, no acute distress  HEENT/NECK:  PERRLA  No jugular venous distention  Cardiac:Regular rate and rhythm, II-III/VI harsh systolic murmur RUSB  Carotids: 1+ pulses, delayed upstrokes, no bruits   Pulmonary:  Breath sounds clear bilaterally  Abdomen:  Non-tender, Non-distended  Positive bowel sounds  Upper extremities: 1+ radial pulses; brisk capillary refill; hands cool  Lower extremities: Extremities warm/dry  PT/DP pulses 1+ bilaterally  1+ edema B/L  Neuro: Alert and oriented X 3  Sensation is grossly intact  No focal deficits  Musculoskeletal: MAEE, stable gait  Skin: Warm/Dry, without rashes or lesions  Lab Results:   Lab Results   Component Value Date     12/21/2016    SODIUM 139 03/02/2022    K 3 9 03/02/2022    CL 99 03/02/2022    CO2 24 03/02/2022    BUN 16 03/02/2022    CREATININE 0 96 03/02/2022    GLUC 113 (H) 03/02/2022    CALCIUM 9 4 12/21/2016    AST 38 03/02/2022    ALT 35 03/02/2022    ALKPHOS 108 12/21/2016    PROT 6 9 12/21/2016    TP 7 1 03/02/2022    BILITOT 1 2 12/21/2016    TBILI 1 3 (H) 03/02/2022    EGFR 81 03/02/2022     Lab Results   Component Value Date    WBC 7 3 03/02/2022    HGB 16 0 03/02/2022    HCT 45 5 03/02/2022    MCV 93 03/02/2022     03/02/2022     Lab Results   Component Value Date    HGBA1C 5 1 08/20/2020     Lab Results   Component Value Date    CKTOTAL 92 04/02/2019       Imaging Studies:     Echocardiogram: 2/8/22      Left Ventricle: Left ventricular cavity size is normal  Systolic function is normal (55%)  Wall motion cannot be accurately assessed  Diastolic function is mildly abnormal, consistent with grade I (abnormal) relaxation  There is concentric LV hypertrophy    Right Ventricle: Right ventricular cavity size is dilated  Systolic function is normal     Left Atrium: The atrium is mildly dilated    Right Atrium: The atrium is mildly dilated    Aortic Valve:  The aortic valve is trileaflet  The leaflets are mildly thickened  The leaflets are mildly calcified  There is reduced mobility  There is severe stenosis  Peak and mean AV gradients were 64 and 44 mm Hg respectively  MICHELLE by the continuity equation method was 0 7 sq cm    Mitral Valve: There is mild annular calcification  There is trace regurgitation    Tricuspid Valve:  There is trace regurgitation      Left Ventricle Measurements    Function/Volumes   A4C EF 54 %      LVOT stroke volume 73 15 cm3      LVOT SI 33 ml/m2      Dimensions   LVIDd 4 cm      LVIDS 2 8 cm      IVSd 1 4 cm      LVPWd 1 cm      LVOT area 3 46 cm2      FS 30 %      Diastolic Filling   MV E' Tissue Velocity Septal 6 cm/s      E wave deceleration time 317 ms      MV Peak E Ryder 54 cm/s      MV Peak A Ryder 0 88 m/s       Report Measurements   AV LVOT peak gradient 3 mmHg           Interventricular Septum Measurements    Shunt Ratio   LVOT peak VTI 21 13 cm      LVOT peak ryder 0 79 m/s           Right Ventricle Measurements    Dimensions   RVID d 4 7 cm            Left Atrium Measurements    Dimensions   LA size 4 9 cm      RYANNE A4C 21 4 cm2            Right Atrium Measurements    Dimensions   RAA A4C 22 3 cm2            Atrial Septum Measurements    Shunt Ratio   LVOT peak VTI 21 13 cm      LVOT peak ryder 0 79 m/s            Aortic Valve Measurements    Stenosis   Ao peak ryder retrograde 3 7 m/s      LVOT peak ryder 0 79 m/s      Ao  31 cm      LVOT peak VTI 21 13 cm      AV mean gradient 44 mmHg      LVOT mn grad 2 mmHg      AV peak gradient 64 mmHg      AV LVOT peak gradient 3 mmHg      AV Velocity Ratio 0 21       Area/Dimensions   AV valve area 0 69 cm2      AV area by cont VTI 0 7 cm2      AV area peak ryder 0 7 cm2      LVOT diameter 2 1 cm      LVOT area 3 46 cm2            Mitral Valve Measurements    Stenosis   MV stenosis pressure 1/2 time 0 ms            Aorta Measurements    Aortic Dimensions   Ao root 3 1 cm             Gated CTA of the chest/abdomen/pelvis: 3/10/22  TAVR measurements:    Annulus: diameter 30 2 x 21 8 mm      area: 497 5 sq mm    Annulus to LCA: 17 5 mm    Annulus to RCA: 18 6 mm    Minimal diameter right iliofemoral segment: 8 4 mm    Minimal diameter left iliofemoral segment: 9 0 mm  2  No significant aortoiliac occlusive disease  3  Stable 2 cm left lower lobe lung mass unchanged since at least 2014 most likely to be a hamartoma  4  Cholelithiasis       NM Myocardial Perfusion scan: 2/9/22    1  No chest discomfort or EKG changes with Lexiacan  2  There is a moderate intensity small fixed inferobasal defect most consistent with inferobasal scar but could not r/o tissue attenuation  3  Inferobasal hypokinesis  Ejection fraction =58%  4  No evidence of ischemia       Carotid artery ultrasound: 3/10/22  RIGHT:  There is <50% stenosis noted in the internal carotid artery  Plaque is  homogenous and smooth  Vertebral artery flow is antegrade  There is no significant subclavian artery  disease  LEFT:  There is <50% stenosis noted in the internal carotid artery  Plaque is  homogenous and smooth  Vertebral artery flow is antegrade  There is no significant subclavian artery  Disease      I have personally reviewed pertinent films in PACS     PCP and Cardiology notes reviewed       TAVR evaluation Assessment:     5 Meter Walk Test:      Attempt 1: 6 sec   Attempt 2: 6 sec   Attempt 3: 7 sec    STS Risk Score: 2 0%    [address]: III    KCCQ-12 completed    Assessment:  Patient Active Problem List    Diagnosis Date Noted    Gait instability 08/10/2016    Allergic rhinitis 03/21/2016    History of stroke 02/17/2016    Nonrheumatic aortic valve stenosis 08/28/2015    Hyperlipidemia 08/28/2015    Male erectile dysfunction 02/19/2015    BPH with obstruction/lower urinary tract symptoms 02/20/2014    Benign essential hypertension 01/20/2014    Insomnia 01/20/2014    Pulmonary nodule seen on imaging study 01/20/2014    Atherosclerotic heart disease of native coronary artery without angina pectoris 11/21/2013     Severe aortic stenosis; TAVR workup completed    Plan:    Gemma Parnell has symptomatic severe aortic stenosis  They have undergone testing for transcatheter aortic valve replacement  The results of these studies have been interpreted by the multidisciplinary TAVR team who have determined the patient to be Intermediate risk for open aortic valve replacement based on other pre-existing comobidities not reflected in the STS risk score  The risks, benefits, and alternatives to TAVR were discussed in detail with the patient today  They understand and wish to proceed with transfemoral transcatheter aortic valve replacement  Informed consent was obtained and a date for the intervention has been set  Gemma Parnell was comfortable with our recommendations, and their questions were answered to their satisfaction  The following preoperative instructions were provided at the conclusion of their consultation:     1  You will receive a phone call from the hospital between 2:00 PM and 8:00 PM the day prior to surgery to confirm arrival time and location  For surgery on Mondays, you will receive a call on Friday  2  Do not drink or eat anything after midnight the night before surgery  That includes no water, candy, gum, lozenges, Lifesavers, etc  We recommend you not eat any salty or fatty snack food, consume alcohol or smoke the night before surgery  3  Continue taking aspirin but only 81 mg daily  4  If you take Coumadin and/or Plavix, discontinue it 5 days before surgery  5  If you are diabetic, do not take any of your diabetic pills the morning of surgery  If you take Lantus insulin, you may take it at your regularly scheduled time the day before surgery  Do not take any other insulins the morning of surgery    6  The 2 nights before surgery, take a shower each night using the special antiseptic soap or soap sponges you received from the office or hospital  Dawn Cloud your hair with regular shampoo and rinse completely before using the antiseptic sponges  Use the sponge to wash from your neck down, with special attention to the armpits and groin area  Do not use any other soap or cleanser on your skin  Do not use lotions, powder, deodorant or perfume of any kind on your skin after you shower  Use clean bed linens and wear clean pajamas after your shower  7  You will be prescribed Mupirocin nasal ointment  Apply to both nostrils twice a day for 5 days prior to surgery  8  Do not take a shower the morning of her surgery; you'll be given a special" bath" at the hospital   9  Notify the CT surgery office if you develop a cold, sore throat, cough, fever or other health issues before your surgery    10  Other medication changes included the following:   CONTINUE PLAVIX  [address]  HTN     SIGNATURE: MEGA San  DATE: March 28, 2022  TIME: 1:18 PM

## 2022-03-28 NOTE — LETTER
March 28, 2022     Edel Mata DO  [address] [facility] [place] [address]    Patient: Edvin Alvarez   YOB: 1945   Date of Visit: 3/28/2022       Dear Dr Valentín Hoffman:    Thank you for referring Rut Denis to me for evaluation  Below are my notes for this consultation  If you have questions, please do not hesitate to call me  I look forward to following your patient along with you  Sincerely,        Adolfo Keller DO        CC: MD Blayne Magaña CRNP  3/28/2022  1:41 PM  Attested  Pre-Op History & Physical - Cardiothoracic Surgery   Edvin Alvarez 64 y o  male MRN: [id number]    Physician Requesting Consult: Dr Marline Rodriguez    Reason for Consult / Principal Problem: Aortic stenosis, Non-Rheumatic    History of Present Illness: Edvin Alvarez is a 64y o  year old male who was previously evaluated in our office by THOMAS Jurado  for transcatheter aortic valve replacement  During this initial consultation, arrangements were made for the following studies to be completed: Gated CTA of the chest/abdomen/pelvis,  dental clearance and carotid artery ultrasound  Edvin Alvarez now presents to review the results of these tests and obtain a second surgeon to confirm the suitability of proceeding with transcatheter aortic valve replacment  In review, Edvin Alvarez is a 64y o  year old male with a PMHx  notable for CAD s/p CABG x 2 (LIMA-LAD, SVG-RCA; 2004), HTN, HLD, 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, arthritis, BPH and stable lung nodule  Patient followed by his cardiologist and PCP  Most recent Echo with progression of his AS to severe range       Upon interview patient admits to TAPIA, "slowing down" and  lightheadedness  More recently he has noted transient heart fluttering that lasts ~ 10-15 seconds and is not associated with any other symptoms   He denies chest pain, rapid heart beat, presyncope/syncope, weight gain, PND or orthopnea  He lives alone in a single story home and states he is having trouble keeping up with the cleaning  He states all of his family live in [place] but states he has a friend who lives across the street from him that can lend him some help  Patient states his son is planning to travel her for the week of his surgery  Patient is a [employer]  He quit smoking at age 19  He denies alcohol or drug use  He is edentulous with full dentures,  Covid immunizations: 21 & 3/9/21; booster: 21    Past Medical History:  Past Medical History:   Diagnosis Date    Arthritis     Cardiac disorder     Hypertension     Stroke (cerebrum) ([address] )          Past Surgical History:   Past Surgical History:   Procedure Laterality Date    CORONARY ARTERY BYPASS GRAFT  2004    x2 LIMA-LAD, SVG-RCA    TONSILLECTOMY           Family History:  Family History   Problem Relation Age of Onset    Coronary artery disease Mother     Arthritis Mother     Hypertension Mother     Stroke Mother     Other Father         aortic valve replacement; CABG    Arthritis Father     Hypertension Brother     Stroke Brother          Social History:    Social History     Substance and Sexual Activity   Alcohol Use No     Social History     Substance and Sexual Activity   Drug Use No     Social History     Tobacco Use   Smoking Status Former Smoker    Packs/day: 1 50    Years: 4 00    Pack years: 6 00    Types: Cigarettes    Quit date: Stacey Jones Years since quittin 2   Smokeless Tobacco Never Used   Tobacco Comment    former light tobacco smoker       Home Medications:   Prior to Admission medications    Medication Sig Start Date End Date Taking?  Authorizing Provider   amLODIPine (NORVASC) 5 mg tablet Take 1 tablet (5 mg total) by mouth daily 21  Yes Jakob Doss,    aspirin (ASPIRIN LOW DOSE) 81 mg EC tablet Take 1 tablet by mouth daily 17  Yes Historical Provider, MD   atorvastatin (LIPITOR) 40 mg tablet Take 1 tablet (40 mg total) by mouth daily 8/19/21  Yes Jakob Doss DO   carvedilol (COREG) 6 25 mg tablet Take 1 tablet (6 25 mg total) by mouth 2 (two) times a day 8/19/21  Yes Jakob Doss DO   Cholecalciferol (VITAMIN D3) 5000 units CAPS Take by mouth daily  1/13/14  Yes Historical Provider, MD   clopidogrel (PLAVIX) 75 mg tablet Take 1 tablet (75 mg total) by mouth daily 11/30/21 3/28/22 Yes Jakob Doss DO   finasteride (PROSCAR) 5 mg tablet Take 1 tablet (5 mg total) by mouth daily 8/19/21  Yes Jakob Doss DO   mirtazapine (REMERON) 15 mg tablet Take 15 mg by mouth daily at bedtime   Yes Historical Provider, MD   olmesartan-hydrochlorothiazide (BENICAR HCT) 40-25 MG per tablet Take 1 tablet by mouth daily 8/19/21  Yes Jakob Doss DO   mupirocin (BACTROBAN) 2 % ointment Apply 1 application topically 2 (two) times a day for 5 days Apply to each nostril twice daily for five days before your operation   3/28/22 4/2/22  EMGA Medina       Allergies:  No Known Allergies    Review of Systems:  Review of Systems - History obtained from chart review and the patient  General ROS: positive for  - fatigue and change in activity tolerance   negative for - chills or fever  Psychological ROS: negative  Ophthalmic ROS: positive for - decreased vision  ENT ROS: edentulous, full dentures  Allergy and Immunology ROS: negative  Hematological and Lymphatic ROS: negative for - bleeding problems, blood clots, bruising or jaundice  Endocrine ROS: negative  Breast ROS: negative  Respiratory ROS: no cough, shortness of breath, or wheezing  Cardiovascular ROS: positive for - dyspnea on exertion, murmur and palpitations  negative for - chest pain, edema, irregular heartbeat, loss of consciousness, orthopnea, paroxysmal nocturnal dyspnea or rapid heart rate  Gastrointestinal ROS: no abdominal pain, change in bowel habits, or black or bloody stools  Genito-Urinary ROS: positive for - urinary frequency/urgency  negative for - dysuria or hematuria  Musculoskeletal ROS: positive for - gait disturbance and muscular weakness  Neurological ROS: positive for - dizziness, gait disturbance, impaired coordination/balance and weakness  Dermatological ROS: negative    Vital Signs:     Vitals:    03/28/22 1257 03/28/22 1259   BP: (!) 189/91 (!) 178/94   BP Location: Left arm Right arm   Patient Position: Sitting Sitting   Cuff Size: Standard    Pulse: 67    Resp: 18    SpO2: 97%    Weight: 108 kg (238 lb 12 8 oz)    Height: 6' (1 829 m)        Physical Exam:    General: Alert, oriented, well developed, no acute distress  HEENT/NECK:  PERRLA  No jugular venous distention  Cardiac:Regular rate and rhythm, II-III/VI harsh systolic murmur RUSB  Carotids: 1+ pulses, delayed upstrokes, no bruits   Pulmonary:  Breath sounds clear bilaterally  Abdomen:  Non-tender, Non-distended  Positive bowel sounds  Upper extremities: 1+ radial pulses; brisk capillary refill; hands cool  Lower extremities: Extremities warm/dry  PT/DP pulses 1+ bilaterally  1+ edema B/L  Neuro: Alert and oriented X 3  Sensation is grossly intact  No focal deficits  Musculoskeletal: MAEE, stable gait  Skin: Warm/Dry, without rashes or lesions      Lab Results:   Lab Results   Component Value Date     12/21/2016    SODIUM 139 03/02/2022    K 3 9 03/02/2022    CL 99 03/02/2022    CO2 24 03/02/2022    BUN 16 03/02/2022    CREATININE 0 96 03/02/2022    GLUC 113 (H) 03/02/2022    CALCIUM 9 4 12/21/2016    AST 38 03/02/2022    ALT 35 03/02/2022    ALKPHOS 108 12/21/2016    PROT 6 9 12/21/2016    TP 7 1 03/02/2022    BILITOT 1 2 12/21/2016    TBILI 1 3 (H) 03/02/2022    EGFR 81 03/02/2022     Lab Results   Component Value Date    WBC 7 3 03/02/2022    HGB 16 0 03/02/2022    HCT 45 5 03/02/2022    MCV 93 03/02/2022     03/02/2022     Lab Results   Component Value Date    HGBA1C 5 1 08/20/2020     Lab Results   Component Value Date CKTOTAL 92 04/02/2019       Imaging Studies:     Echocardiogram: 2/8/22      Left Ventricle: Left ventricular cavity size is normal  Systolic function is normal (55%)  Wall motion cannot be accurately assessed  Diastolic function is mildly abnormal, consistent with grade I (abnormal) relaxation  There is concentric LV hypertrophy    Right Ventricle: Right ventricular cavity size is dilated  Systolic function is normal     Left Atrium: The atrium is mildly dilated    Right Atrium: The atrium is mildly dilated    Aortic Valve: The aortic valve is trileaflet  The leaflets are mildly thickened  The leaflets are mildly calcified  There is reduced mobility  There is severe stenosis  Peak and mean AV gradients were 64 and 44 mm Hg respectively  MICHELLE by the continuity equation method was 0 7 sq cm    Mitral Valve: There is mild annular calcification  There is trace regurgitation    Tricuspid Valve:  There is trace regurgitation      Left Ventricle Measurements    Function/Volumes   A4C EF 54 %      LVOT stroke volume 73 15 cm3      LVOT SI 33 ml/m2      Dimensions   LVIDd 4 cm      LVIDS 2 8 cm      IVSd 1 4 cm      LVPWd 1 cm      LVOT area 3 46 cm2      FS 30 %      Diastolic Filling   MV E' Tissue Velocity Septal 6 cm/s      E wave deceleration time 317 ms      MV Peak E Ryder 54 cm/s      MV Peak A Ryder 0 88 m/s       Report Measurements   AV LVOT peak gradient 3 mmHg           Interventricular Septum Measurements    Shunt Ratio   LVOT peak VTI 21 13 cm      LVOT peak ryder 0 79 m/s           Right Ventricle Measurements    Dimensions   RVID d 4 7 cm            Left Atrium Measurements    Dimensions   LA size 4 9 cm      RYANNE A4C 21 4 cm2            Right Atrium Measurements    Dimensions   RAA A4C 22 3 cm2            Atrial Septum Measurements    Shunt Ratio   LVOT peak VTI 21 13 cm      LVOT peak ryder 0 79 m/s            Aortic Valve Measurements    Stenosis   Ao peak ryder retrograde 3 7 m/s      LVOT peak ryder 0 79 m/s      Ao  31 cm      LVOT peak VTI 21 13 cm      AV mean gradient 44 mmHg      LVOT mn grad 2 mmHg      AV peak gradient 64 mmHg      AV LVOT peak gradient 3 mmHg      AV Velocity Ratio 0 21       Area/Dimensions   AV valve area 0 69 cm2      AV area by cont VTI 0 7 cm2      AV area peak lena 0 7 cm2      LVOT diameter 2 1 cm      LVOT area 3 46 cm2            Mitral Valve Measurements    Stenosis   MV stenosis pressure 1/2 time 0 ms            Aorta Measurements    Aortic Dimensions   Ao root 3 1 cm             Gated CTA of the chest/abdomen/pelvis: 3/10/22  TAVR measurements:    Annulus: diameter 30 2 x 21 8 mm      area: 497 5 sq mm    Annulus to LCA: 17 5 mm    Annulus to RCA: 18 6 mm    Minimal diameter right iliofemoral segment: 8 4 mm    Minimal diameter left iliofemoral segment: 9 0 mm  2  No significant aortoiliac occlusive disease  3  Stable 2 cm left lower lobe lung mass unchanged since at least 2014 most likely to be a hamartoma  4  Cholelithiasis       NM Myocardial Perfusion scan: 2/9/22    1  No chest discomfort or EKG changes with Lexiacan  2  There is a moderate intensity small fixed inferobasal defect most consistent with inferobasal scar but could not r/o tissue attenuation  3  Inferobasal hypokinesis  Ejection fraction =58%  4  No evidence of ischemia       Carotid artery ultrasound: 3/10/22  RIGHT:  There is <50% stenosis noted in the internal carotid artery  Plaque is  homogenous and smooth  Vertebral artery flow is antegrade  There is no significant subclavian artery  disease  LEFT:  There is <50% stenosis noted in the internal carotid artery  Plaque is  homogenous and smooth  Vertebral artery flow is antegrade  There is no significant subclavian artery  Disease      I have personally reviewed pertinent films in PACS     PCP and Cardiology notes reviewed       TAVR evaluation Assessment:     5 Meter Walk Test:      Attempt 1: 6 sec   Attempt 2: 6 sec   Attempt 3: 7 sec    STS Risk Score: 2 0%    Herlinda Ash 122: III    KCCQ-12 completed    Assessment:  Patient Active Problem List    Diagnosis Date Noted    Gait instability 08/10/2016    Allergic rhinitis 03/21/2016    History of stroke 02/17/2016    Nonrheumatic aortic valve stenosis 08/28/2015    Hyperlipidemia 08/28/2015    Male erectile dysfunction 02/19/2015    BPH with obstruction/lower urinary tract symptoms 02/20/2014    Benign essential hypertension 01/20/2014    Insomnia 01/20/2014    Pulmonary nodule seen on imaging study 01/20/2014    Atherosclerotic heart disease of native coronary artery without angina pectoris 11/21/2013     Severe aortic stenosis; TAVR workup completed    Plan:    Noa Rodgers has symptomatic severe aortic stenosis  They have undergone testing for transcatheter aortic valve replacement  The results of these studies have been interpreted by the multidisciplinary TAVR team who have determined the patient to be Intermediate risk for open aortic valve replacement based on other pre-existing comobidities not reflected in the STS risk score  The risks, benefits, and alternatives to TAVR were discussed in detail with the patient today  They understand and wish to proceed with transfemoral transcatheter aortic valve replacement  Informed consent was obtained and a date for the intervention has been set  Noa Rodgers was comfortable with our recommendations, and their questions were answered to their satisfaction  The following preoperative instructions were provided at the conclusion of their consultation:     1  You will receive a phone call from the hospital between 2:00 PM and 8:00 PM the day prior to surgery to confirm arrival time and location  For surgery on Mondays, you will receive a call on Friday  2  Do not drink or eat anything after midnight the night before surgery   That includes no water, candy, gum, lozenges, Lifesavers, etc  We recommend you not eat any salty or fatty snack food, consume alcohol or smoke the night before surgery  3  Continue taking aspirin but only 81 mg daily  4  If you take Coumadin and/or Plavix, discontinue it 5 days before surgery  5  If you are diabetic, do not take any of your diabetic pills the morning of surgery  If you take Lantus insulin, you may take it at your regularly scheduled time the day before surgery  Do not take any other insulins the morning of surgery  6  The 2 nights before surgery, take a shower each night using the special antiseptic soap or soap sponges you received from the office or hospital  [facility] your hair with regular shampoo and rinse completely before using the antiseptic sponges  Use the sponge to wash from your neck down, with special attention to the armpits and groin area  Do not use any other soap or cleanser on your skin  Do not use lotions, powder, deodorant or perfume of any kind on your skin after you shower  Use clean bed linens and wear clean pajamas after your shower  7  You will be prescribed Mupirocin nasal ointment  Apply to both nostrils twice a day for 5 days prior to surgery  8  Do not take a shower the morning of her surgery; you'll be given a special" bath" at the hospital   9  Notify the CT surgery office if you develop a cold, sore throat, cough, fever or other health issues before your surgery  10  Other medication changes included the following:   CONTINUE PLAVIX  [address]  HTN     SIGNATURE: MEGA Coates  DATE: March 28, 2022  TIME: 1:18 PM  Attestation signed by Kami Cruz DO at 3/28/2022  1:43 PM:  Mr Jerardo Uribe is here for his TAVR follow-up visit to review his pre-op testing and plan his procedure  He has had a previous CABG and a nuclear stress test showed no signs of ischemia  He has no significant carotid artery disease  The risks, benefits and alternatives were discussed with him in detail and he is willing to proceed    TF TAVR planned for 4/12/22

## 2022-03-29 DIAGNOSIS — F51.02 ADJUSTMENT INSOMNIA: Primary | ICD-10-CM

## 2022-03-29 RX ORDER — MIRTAZAPINE 15 MG/1
TABLET, FILM COATED ORAL
Qty: 90 TABLET | Refills: 2 | Status: SHIPPED | OUTPATIENT
Start: 2022-03-29

## 2022-04-04 ENCOUNTER — OFFICE VISIT (OUTPATIENT)
Dept: LAB | Facility: HOSPITAL | Age: 77
End: 2022-04-04
Payer: COMMERCIAL

## 2022-04-04 ENCOUNTER — APPOINTMENT (OUTPATIENT)
Dept: LAB | Facility: HOSPITAL | Age: 77
End: 2022-04-04
Payer: COMMERCIAL

## 2022-04-04 DIAGNOSIS — I35.0 NONRHEUMATIC AORTIC VALVE STENOSIS: ICD-10-CM

## 2022-04-04 DIAGNOSIS — Z01.812 ENCOUNTER FOR PRE-OPERATIVE LABORATORY TESTING: ICD-10-CM

## 2022-04-04 DIAGNOSIS — I35.9 AORTIC VALVE DISORDER: ICD-10-CM

## 2022-04-04 DIAGNOSIS — Z01.818 PREOP TESTING: ICD-10-CM

## 2022-04-04 DIAGNOSIS — Z01.818 PRE-OP TESTING: ICD-10-CM

## 2022-04-04 LAB
ALBUMIN SERPL BCP-MCNC: 3.9 G/DL (ref 3.5–5)
ALP SERPL-CCNC: 107 U/L (ref 46–116)
ALT SERPL W P-5'-P-CCNC: 47 U/L (ref 12–78)
ANION GAP SERPL CALCULATED.3IONS-SCNC: 6 MMOL/L (ref 4–13)
AST SERPL W P-5'-P-CCNC: 31 U/L (ref 5–45)
BILIRUB SERPL-MCNC: 1.53 MG/DL (ref 0.2–1)
BUN SERPL-MCNC: 20 MG/DL (ref 5–25)
CALCIUM SERPL-MCNC: 9.1 MG/DL (ref 8.3–10.1)
CHLORIDE SERPL-SCNC: 102 MMOL/L (ref 100–108)
CO2 SERPL-SCNC: 30 MMOL/L (ref 21–32)
CREAT SERPL-MCNC: 1.08 MG/DL (ref 0.6–1.3)
ERYTHROCYTE [DISTWIDTH] IN BLOOD BY AUTOMATED COUNT: 13 % (ref 11.6–15.1)
GFR SERPL CREATININE-BSD FRML MDRD: 65 ML/MIN/1.73SQ M
GLUCOSE SERPL-MCNC: 113 MG/DL (ref 65–140)
HCT VFR BLD AUTO: 44.3 % (ref 36.5–49.3)
HGB BLD-MCNC: 15.5 G/DL (ref 12–17)
INR PPP: 1.02 (ref 0.84–1.19)
MCH RBC QN AUTO: 32.9 PG (ref 26.8–34.3)
MCHC RBC AUTO-ENTMCNC: 35 G/DL (ref 31.4–37.4)
MCV RBC AUTO: 94 FL (ref 82–98)
PLATELET # BLD AUTO: 214 THOUSANDS/UL (ref 149–390)
PMV BLD AUTO: 9.5 FL (ref 8.9–12.7)
POTASSIUM SERPL-SCNC: 3.9 MMOL/L (ref 3.5–5.3)
PROT SERPL-MCNC: 7.6 G/DL (ref 6.4–8.2)
PROTHROMBIN TIME: 13 SECONDS (ref 11.6–14.5)
RBC # BLD AUTO: 4.71 MILLION/UL (ref 3.88–5.62)
SODIUM SERPL-SCNC: 138 MMOL/L (ref 136–145)
WBC # BLD AUTO: 7.58 THOUSAND/UL (ref 4.31–10.16)

## 2022-04-04 PROCEDURE — 85610 PROTHROMBIN TIME: CPT

## 2022-04-04 PROCEDURE — 80053 COMPREHEN METABOLIC PANEL: CPT

## 2022-04-04 PROCEDURE — 86901 BLOOD TYPING SEROLOGIC RH(D): CPT | Performed by: THORACIC SURGERY (CARDIOTHORACIC VASCULAR SURGERY)

## 2022-04-04 PROCEDURE — 86900 BLOOD TYPING SEROLOGIC ABO: CPT | Performed by: THORACIC SURGERY (CARDIOTHORACIC VASCULAR SURGERY)

## 2022-04-04 PROCEDURE — 85027 COMPLETE CBC AUTOMATED: CPT

## 2022-04-04 PROCEDURE — 36415 COLL VENOUS BLD VENIPUNCTURE: CPT

## 2022-04-04 PROCEDURE — 87081 CULTURE SCREEN ONLY: CPT

## 2022-04-04 PROCEDURE — 93005 ELECTROCARDIOGRAM TRACING: CPT

## 2022-04-04 PROCEDURE — 86850 RBC ANTIBODY SCREEN: CPT | Performed by: THORACIC SURGERY (CARDIOTHORACIC VASCULAR SURGERY)

## 2022-04-05 ENCOUNTER — LAB REQUISITION (OUTPATIENT)
Dept: LAB | Facility: HOSPITAL | Age: 77
End: 2022-04-05
Payer: COMMERCIAL

## 2022-04-05 DIAGNOSIS — I35.0 NONRHEUMATIC AORTIC (VALVE) STENOSIS: ICD-10-CM

## 2022-04-05 LAB
ABO GROUP BLD: NORMAL
BLD GP AB SCN SERPL QL: NEGATIVE
RH BLD: POSITIVE
SPECIMEN EXPIRATION DATE: NORMAL

## 2022-04-06 LAB — MRSA NOSE QL CULT: NORMAL

## 2022-04-07 LAB
ATRIAL RATE: 58 BPM
P AXIS: 22 DEGREES
PR INTERVAL: 228 MS
QRS AXIS: -9 DEGREES
QRSD INTERVAL: 96 MS
QT INTERVAL: 432 MS
QTC INTERVAL: 424 MS
T WAVE AXIS: 10 DEGREES
VENTRICULAR RATE: 58 BPM

## 2022-04-07 PROCEDURE — 93010 ELECTROCARDIOGRAM REPORT: CPT | Performed by: INTERNAL MEDICINE

## 2022-04-11 ENCOUNTER — ANESTHESIA EVENT (OUTPATIENT)
Dept: PERIOP | Facility: HOSPITAL | Age: 77
DRG: 267 | End: 2022-04-11
Payer: MEDICARE

## 2022-04-11 RX ORDER — HEPARIN SODIUM 1000 [USP'U]/ML
400 INJECTION, SOLUTION INTRAVENOUS; SUBCUTANEOUS ONCE
Status: CANCELLED | OUTPATIENT
Start: 2022-04-12

## 2022-04-12 ENCOUNTER — APPOINTMENT (INPATIENT)
Dept: RADIOLOGY | Facility: HOSPITAL | Age: 77
DRG: 267 | End: 2022-04-12
Payer: MEDICARE

## 2022-04-12 ENCOUNTER — ANESTHESIA (OUTPATIENT)
Dept: PERIOP | Facility: HOSPITAL | Age: 77
DRG: 267 | End: 2022-04-12
Payer: MEDICARE

## 2022-04-12 ENCOUNTER — APPOINTMENT (OUTPATIENT)
Dept: NON INVASIVE DIAGNOSTICS | Facility: HOSPITAL | Age: 77
DRG: 267 | End: 2022-04-12
Attending: THORACIC SURGERY (CARDIOTHORACIC VASCULAR SURGERY)
Payer: MEDICARE

## 2022-04-12 ENCOUNTER — HOSPITAL ENCOUNTER (INPATIENT)
Facility: HOSPITAL | Age: 77
LOS: 1 days | Discharge: HOME HEALTH | DRG: 267 | End: 2022-04-13
Attending: THORACIC SURGERY (CARDIOTHORACIC VASCULAR SURGERY) | Admitting: THORACIC SURGERY (CARDIOTHORACIC VASCULAR SURGERY)
Payer: MEDICARE

## 2022-04-12 DIAGNOSIS — Z95.2 S/P TAVR (TRANSCATHETER AORTIC VALVE REPLACEMENT): Primary | ICD-10-CM

## 2022-04-12 DIAGNOSIS — I35.0 NONRHEUMATIC AORTIC VALVE STENOSIS: ICD-10-CM

## 2022-04-12 DIAGNOSIS — I35.9 NONRHEUMATIC AORTIC VALVE DISORDER: ICD-10-CM

## 2022-04-12 PROBLEM — I50.9 CHF (CONGESTIVE HEART FAILURE) (HCC): Status: ACTIVE | Noted: 2022-04-12

## 2022-04-12 PROBLEM — Z87.891 FORMER TOBACCO USE: Status: ACTIVE | Noted: 2022-04-12

## 2022-04-12 PROBLEM — I44.30 AVB (ATRIOVENTRICULAR BLOCK): Status: ACTIVE | Noted: 2022-04-12

## 2022-04-12 PROBLEM — E87.8 HYPERCHLOREMIA: Status: ACTIVE | Noted: 2022-04-12

## 2022-04-12 LAB
ANION GAP SERPL CALCULATED.3IONS-SCNC: 4 MMOL/L (ref 4–13)
AORTIC VALVE MEAN VELOCITY: 8.1 M/S
ATRIAL RATE: 77 BPM
AV AREA BY CONTINUOUS VTI: 2.2 CM2
AV AREA PEAK VELOCITY: 1.9 CM2
AV LVOT MEAN GRADIENT: 2 MMHG
AV LVOT PEAK GRADIENT: 5 MMHG
AV MEAN GRADIENT: 4 MMHG
AV PEAK GRADIENT: 11 MMHG
BASE EXCESS BLDA CALC-SCNC: 2 MMOL/L (ref -2–3)
BASE EXCESS BLDA CALC-SCNC: 4 MMOL/L (ref -2–3)
BUN SERPL-MCNC: 26 MG/DL (ref 5–25)
CA-I BLD-SCNC: 1.2 MMOL/L (ref 1.12–1.32)
CA-I BLD-SCNC: 1.26 MMOL/L (ref 1.12–1.32)
CALCIUM SERPL-MCNC: 8.7 MG/DL (ref 8.3–10.1)
CHLORIDE SERPL-SCNC: 109 MMOL/L (ref 100–108)
CO2 SERPL-SCNC: 28 MMOL/L (ref 21–32)
CREAT SERPL-MCNC: 1.02 MG/DL (ref 0.6–1.3)
DOP CALC AO VTI: 30.8 CM
DOP CALC LVOT AREA: 2.8 CM2
DOP CALC LVOT DIAMETER: 1.9 CM
DOP CALC LVOT PEAK VEL VTI: 23.5 CM
DOP CALC LVOT PEAK VEL: 1.12 M/S
DOP CALC LVOT STROKE INDEX: 30.3 ML/M2
DOP CALC LVOT STROKE VOLUME: 67 CM3
GFR SERPL CREATININE-BSD FRML MDRD: 70 ML/MIN/1.73SQ M
GLUCOSE SERPL-MCNC: 111 MG/DL (ref 65–140)
GLUCOSE SERPL-MCNC: 116 MG/DL (ref 65–140)
GLUCOSE SERPL-MCNC: 120 MG/DL (ref 65–140)
GLUCOSE SERPL-MCNC: 122 MG/DL (ref 65–140)
GLUCOSE SERPL-MCNC: 158 MG/DL (ref 65–140)
GLUCOSE SERPL-MCNC: 167 MG/DL (ref 65–140)
HCO3 BLDA-SCNC: 27.7 MMOL/L (ref 22–28)
HCO3 BLDA-SCNC: 30.4 MMOL/L (ref 24–30)
HCT VFR BLD AUTO: 38.2 % (ref 36.5–49.3)
HCT VFR BLD CALC: 35 % (ref 36.5–49.3)
HCT VFR BLD CALC: 39 % (ref 36.5–49.3)
HGB BLD-MCNC: 13.4 G/DL (ref 12–17)
HGB BLDA-MCNC: 11.9 G/DL (ref 12–17)
HGB BLDA-MCNC: 13.3 G/DL (ref 12–17)
KCT BLD-ACNC: 120 SEC (ref 89–137)
KCT BLD-ACNC: 144 SEC (ref 89–137)
KCT BLD-ACNC: 385 SEC (ref 89–137)
P AXIS: 49 DEGREES
PCO2 BLD: 29 MMOL/L (ref 21–32)
PCO2 BLD: 32 MMOL/L (ref 21–32)
PCO2 BLD: 46 MM HG (ref 36–44)
PCO2 BLD: 54.5 MM HG (ref 42–50)
PH BLD: 7.35 [PH] (ref 7.3–7.4)
PH BLD: 7.39 [PH] (ref 7.35–7.45)
PLATELET # BLD AUTO: 175 THOUSANDS/UL (ref 149–390)
PMV BLD AUTO: 9.5 FL (ref 8.9–12.7)
PO2 BLD: 174 MM HG (ref 75–129)
PO2 BLD: 62 MM HG (ref 35–45)
POTASSIUM BLD-SCNC: 3.7 MMOL/L (ref 3.5–5.3)
POTASSIUM BLD-SCNC: 4 MMOL/L (ref 3.5–5.3)
POTASSIUM SERPL-SCNC: 3.7 MMOL/L (ref 3.5–5.3)
PR INTERVAL: 225 MS
QRS AXIS: -5 DEGREES
QRSD INTERVAL: 92 MS
QT INTERVAL: 396 MS
QTC INTERVAL: 449 MS
SAO2 % BLD FROM PO2: 100 % (ref 60–85)
SAO2 % BLD FROM PO2: 90 % (ref 60–85)
SODIUM BLD-SCNC: 139 MMOL/L (ref 136–145)
SODIUM BLD-SCNC: 141 MMOL/L (ref 136–145)
SODIUM SERPL-SCNC: 141 MMOL/L (ref 136–145)
SPECIMEN SOURCE: ABNORMAL
SPECIMEN SOURCE: NORMAL
T WAVE AXIS: 22 DEGREES
VENTRICULAR RATE: 77 BPM

## 2022-04-12 PROCEDURE — 80048 BASIC METABOLIC PNL TOTAL CA: CPT | Performed by: PHYSICIAN ASSISTANT

## 2022-04-12 PROCEDURE — 82803 BLOOD GASES ANY COMBINATION: CPT

## 2022-04-12 PROCEDURE — 93355 ECHO TRANSESOPHAGEAL (TEE): CPT

## 2022-04-12 PROCEDURE — NC001 PR NO CHARGE: Performed by: PHYSICIAN ASSISTANT

## 2022-04-12 PROCEDURE — C1769 GUIDE WIRE: HCPCS | Performed by: THORACIC SURGERY (CARDIOTHORACIC VASCULAR SURGERY)

## 2022-04-12 PROCEDURE — 02RF38Z REPLACEMENT OF AORTIC VALVE WITH ZOOPLASTIC TISSUE, PERCUTANEOUS APPROACH: ICD-10-PCS | Performed by: THORACIC SURGERY (CARDIOTHORACIC VASCULAR SURGERY)

## 2022-04-12 PROCEDURE — 85049 AUTOMATED PLATELET COUNT: CPT | Performed by: PHYSICIAN ASSISTANT

## 2022-04-12 PROCEDURE — 71045 X-RAY EXAM CHEST 1 VIEW: CPT

## 2022-04-12 PROCEDURE — 82947 ASSAY GLUCOSE BLOOD QUANT: CPT

## 2022-04-12 PROCEDURE — 85014 HEMATOCRIT: CPT

## 2022-04-12 PROCEDURE — 82330 ASSAY OF CALCIUM: CPT

## 2022-04-12 PROCEDURE — 93010 ELECTROCARDIOGRAM REPORT: CPT | Performed by: INTERNAL MEDICINE

## 2022-04-12 PROCEDURE — 85347 COAGULATION TIME ACTIVATED: CPT

## 2022-04-12 PROCEDURE — 85014 HEMATOCRIT: CPT | Performed by: PHYSICIAN ASSISTANT

## 2022-04-12 PROCEDURE — 33361 REPLACE AORTIC VALVE PERQ: CPT | Performed by: THORACIC SURGERY (CARDIOTHORACIC VASCULAR SURGERY)

## 2022-04-12 PROCEDURE — C1751 CATH, INF, PER/CENT/MIDLINE: HCPCS | Performed by: THORACIC SURGERY (CARDIOTHORACIC VASCULAR SURGERY)

## 2022-04-12 PROCEDURE — 84295 ASSAY OF SERUM SODIUM: CPT

## 2022-04-12 PROCEDURE — 85018 HEMOGLOBIN: CPT | Performed by: PHYSICIAN ASSISTANT

## 2022-04-12 PROCEDURE — 93005 ELECTROCARDIOGRAM TRACING: CPT

## 2022-04-12 PROCEDURE — C1894 INTRO/SHEATH, NON-LASER: HCPCS | Performed by: THORACIC SURGERY (CARDIOTHORACIC VASCULAR SURGERY)

## 2022-04-12 PROCEDURE — 82948 REAGENT STRIP/BLOOD GLUCOSE: CPT

## 2022-04-12 PROCEDURE — 86920 COMPATIBILITY TEST SPIN: CPT

## 2022-04-12 PROCEDURE — 33361 REPLACE AORTIC VALVE PERQ: CPT | Performed by: INTERNAL MEDICINE

## 2022-04-12 PROCEDURE — 84132 ASSAY OF SERUM POTASSIUM: CPT

## 2022-04-12 PROCEDURE — C1760 CLOSURE DEV, VASC: HCPCS | Performed by: THORACIC SURGERY (CARDIOTHORACIC VASCULAR SURGERY)

## 2022-04-12 DEVICE — PERCLOSE™ PROSTYLE™ SUTURE-MEDIATED CLOSURE AND REPAIR SYSTEM
Type: IMPLANTABLE DEVICE | Site: GROIN | Status: FUNCTIONAL
Brand: PERCLOSE™ PROSTYLE™

## 2022-04-12 DEVICE — VALVE TAVR SAPIEN 3 ULTRA W/ CMNDR DLV SYS 26MM: Type: IMPLANTABLE DEVICE | Status: FUNCTIONAL

## 2022-04-12 RX ORDER — BISACODYL 10 MG
10 SUPPOSITORY, RECTAL RECTAL DAILY PRN
Status: DISCONTINUED | OUTPATIENT
Start: 2022-04-12 | End: 2022-04-13 | Stop reason: HOSPADM

## 2022-04-12 RX ORDER — ATORVASTATIN CALCIUM 40 MG/1
40 TABLET, FILM COATED ORAL DAILY
Status: DISCONTINUED | OUTPATIENT
Start: 2022-04-12 | End: 2022-04-13 | Stop reason: HOSPADM

## 2022-04-12 RX ORDER — LIDOCAINE HYDROCHLORIDE 10 MG/ML
INJECTION, SOLUTION EPIDURAL; INFILTRATION; INTRACAUDAL; PERINEURAL AS NEEDED
Status: DISCONTINUED | OUTPATIENT
Start: 2022-04-12 | End: 2022-04-12

## 2022-04-12 RX ORDER — ATORVASTATIN CALCIUM 20 MG/1
20 TABLET, FILM COATED ORAL
Status: DISCONTINUED | OUTPATIENT
Start: 2022-04-12 | End: 2022-04-12

## 2022-04-12 RX ORDER — MELATONIN
5000 DAILY
Status: DISCONTINUED | OUTPATIENT
Start: 2022-04-12 | End: 2022-04-13 | Stop reason: HOSPADM

## 2022-04-12 RX ORDER — LOSARTAN POTASSIUM 50 MG/1
100 TABLET ORAL DAILY
Status: DISCONTINUED | OUTPATIENT
Start: 2022-04-12 | End: 2022-04-13 | Stop reason: HOSPADM

## 2022-04-12 RX ORDER — SODIUM CHLORIDE, SODIUM LACTATE, POTASSIUM CHLORIDE, CALCIUM CHLORIDE 600; 310; 30; 20 MG/100ML; MG/100ML; MG/100ML; MG/100ML
INJECTION, SOLUTION INTRAVENOUS CONTINUOUS PRN
Status: DISCONTINUED | OUTPATIENT
Start: 2022-04-12 | End: 2022-04-12

## 2022-04-12 RX ORDER — CEFAZOLIN SODIUM 2 G/50ML
2000 SOLUTION INTRAVENOUS EVERY 8 HOURS
Status: COMPLETED | OUTPATIENT
Start: 2022-04-12 | End: 2022-04-13

## 2022-04-12 RX ORDER — GLYCOPYRROLATE 0.2 MG/ML
INJECTION INTRAMUSCULAR; INTRAVENOUS AS NEEDED
Status: DISCONTINUED | OUTPATIENT
Start: 2022-04-12 | End: 2022-04-12

## 2022-04-12 RX ORDER — SODIUM CHLORIDE, SODIUM GLUCONATE, SODIUM ACETATE, POTASSIUM CHLORIDE, MAGNESIUM CHLORIDE, SODIUM PHOSPHATE, DIBASIC, AND POTASSIUM PHOSPHATE .53; .5; .37; .037; .03; .012; .00082 G/100ML; G/100ML; G/100ML; G/100ML; G/100ML; G/100ML; G/100ML
50 INJECTION, SOLUTION INTRAVENOUS CONTINUOUS
Status: DISPENSED | OUTPATIENT
Start: 2022-04-12 | End: 2022-04-12

## 2022-04-12 RX ORDER — SODIUM CHLORIDE 9 MG/ML
INJECTION, SOLUTION INTRAVENOUS CONTINUOUS PRN
Status: DISCONTINUED | OUTPATIENT
Start: 2022-04-12 | End: 2022-04-12

## 2022-04-12 RX ORDER — ACETAMINOPHEN 325 MG/1
650 TABLET ORAL EVERY 4 HOURS PRN
Status: DISCONTINUED | OUTPATIENT
Start: 2022-04-12 | End: 2022-04-13 | Stop reason: HOSPADM

## 2022-04-12 RX ORDER — CARVEDILOL 6.25 MG/1
6.25 TABLET ORAL 2 TIMES DAILY
Status: DISCONTINUED | OUTPATIENT
Start: 2022-04-12 | End: 2022-04-13

## 2022-04-12 RX ORDER — AMLODIPINE BESYLATE 5 MG/1
5 TABLET ORAL DAILY
Status: DISCONTINUED | OUTPATIENT
Start: 2022-04-12 | End: 2022-04-13 | Stop reason: HOSPADM

## 2022-04-12 RX ORDER — HYDROCHLOROTHIAZIDE 25 MG/1
25 TABLET ORAL DAILY
Status: DISCONTINUED | OUTPATIENT
Start: 2022-04-12 | End: 2022-04-13 | Stop reason: HOSPADM

## 2022-04-12 RX ORDER — HEPARIN SODIUM 1000 [USP'U]/ML
INJECTION, SOLUTION INTRAVENOUS; SUBCUTANEOUS AS NEEDED
Status: DISCONTINUED | OUTPATIENT
Start: 2022-04-12 | End: 2022-04-12

## 2022-04-12 RX ORDER — HEPARIN SODIUM 5000 [USP'U]/ML
5000 INJECTION, SOLUTION INTRAVENOUS; SUBCUTANEOUS EVERY 8 HOURS SCHEDULED
Status: DISCONTINUED | OUTPATIENT
Start: 2022-04-13 | End: 2022-04-13 | Stop reason: HOSPADM

## 2022-04-12 RX ORDER — ONDANSETRON 2 MG/ML
INJECTION INTRAMUSCULAR; INTRAVENOUS AS NEEDED
Status: DISCONTINUED | OUTPATIENT
Start: 2022-04-12 | End: 2022-04-12

## 2022-04-12 RX ORDER — ROCURONIUM BROMIDE 10 MG/ML
INJECTION, SOLUTION INTRAVENOUS AS NEEDED
Status: DISCONTINUED | OUTPATIENT
Start: 2022-04-12 | End: 2022-04-12

## 2022-04-12 RX ORDER — CEFAZOLIN SODIUM 1 G/3ML
INJECTION, POWDER, FOR SOLUTION INTRAMUSCULAR; INTRAVENOUS AS NEEDED
Status: DISCONTINUED | OUTPATIENT
Start: 2022-04-12 | End: 2022-04-12

## 2022-04-12 RX ORDER — CHLORHEXIDINE GLUCONATE 0.12 MG/ML
15 RINSE ORAL 2 TIMES DAILY
Status: DISCONTINUED | OUTPATIENT
Start: 2022-04-12 | End: 2022-04-13 | Stop reason: HOSPADM

## 2022-04-12 RX ORDER — SODIUM CHLORIDE, SODIUM LACTATE, POTASSIUM CHLORIDE, CALCIUM CHLORIDE 600; 310; 30; 20 MG/100ML; MG/100ML; MG/100ML; MG/100ML
75 INJECTION, SOLUTION INTRAVENOUS CONTINUOUS
Status: CANCELLED | OUTPATIENT
Start: 2022-04-12

## 2022-04-12 RX ORDER — ESMOLOL HYDROCHLORIDE 10 MG/ML
INJECTION INTRAVENOUS AS NEEDED
Status: DISCONTINUED | OUTPATIENT
Start: 2022-04-12 | End: 2022-04-12

## 2022-04-12 RX ORDER — ASPIRIN 81 MG/1
81 TABLET ORAL DAILY
Status: DISCONTINUED | OUTPATIENT
Start: 2022-04-12 | End: 2022-04-13 | Stop reason: HOSPADM

## 2022-04-12 RX ORDER — ONDANSETRON 2 MG/ML
4 INJECTION INTRAMUSCULAR; INTRAVENOUS EVERY 6 HOURS PRN
Status: DISCONTINUED | OUTPATIENT
Start: 2022-04-12 | End: 2022-04-13 | Stop reason: HOSPADM

## 2022-04-12 RX ORDER — MIRTAZAPINE 15 MG/1
15 TABLET, FILM COATED ORAL DAILY
Status: DISCONTINUED | OUTPATIENT
Start: 2022-04-12 | End: 2022-04-13 | Stop reason: HOSPADM

## 2022-04-12 RX ORDER — DEXAMETHASONE SODIUM PHOSPHATE 10 MG/ML
INJECTION, SOLUTION INTRAMUSCULAR; INTRAVENOUS AS NEEDED
Status: DISCONTINUED | OUTPATIENT
Start: 2022-04-12 | End: 2022-04-12

## 2022-04-12 RX ORDER — CEFAZOLIN SODIUM 2 G/50ML
2000 SOLUTION INTRAVENOUS ONCE
Status: DISCONTINUED | OUTPATIENT
Start: 2022-04-12 | End: 2022-04-12 | Stop reason: HOSPADM

## 2022-04-12 RX ORDER — NEOSTIGMINE METHYLSULFATE 1 MG/ML
INJECTION INTRAVENOUS AS NEEDED
Status: DISCONTINUED | OUTPATIENT
Start: 2022-04-12 | End: 2022-04-12

## 2022-04-12 RX ORDER — SUCCINYLCHOLINE/SOD CL,ISO/PF 100 MG/5ML
SYRINGE (ML) INTRAVENOUS AS NEEDED
Status: DISCONTINUED | OUTPATIENT
Start: 2022-04-12 | End: 2022-04-12

## 2022-04-12 RX ORDER — PANTOPRAZOLE SODIUM 40 MG/1
40 TABLET, DELAYED RELEASE ORAL
Status: DISCONTINUED | OUTPATIENT
Start: 2022-04-13 | End: 2022-04-13 | Stop reason: HOSPADM

## 2022-04-12 RX ORDER — PROPOFOL 10 MG/ML
INJECTION, EMULSION INTRAVENOUS AS NEEDED
Status: DISCONTINUED | OUTPATIENT
Start: 2022-04-12 | End: 2022-04-12

## 2022-04-12 RX ORDER — CHLORHEXIDINE GLUCONATE 0.12 MG/ML
15 RINSE ORAL ONCE
Status: COMPLETED | OUTPATIENT
Start: 2022-04-12 | End: 2022-04-12

## 2022-04-12 RX ORDER — FENTANYL CITRATE/PF 50 MCG/ML
50 SYRINGE (ML) INJECTION
Status: DISCONTINUED | OUTPATIENT
Start: 2022-04-12 | End: 2022-04-12 | Stop reason: HOSPADM

## 2022-04-12 RX ORDER — DOCUSATE SODIUM 100 MG/1
100 CAPSULE, LIQUID FILLED ORAL 2 TIMES DAILY
Status: DISCONTINUED | OUTPATIENT
Start: 2022-04-12 | End: 2022-04-13 | Stop reason: HOSPADM

## 2022-04-12 RX ORDER — FINASTERIDE 5 MG/1
5 TABLET, FILM COATED ORAL DAILY
Status: DISCONTINUED | OUTPATIENT
Start: 2022-04-12 | End: 2022-04-13 | Stop reason: HOSPADM

## 2022-04-12 RX ORDER — PROTAMINE SULFATE 10 MG/ML
INJECTION, SOLUTION INTRAVENOUS AS NEEDED
Status: DISCONTINUED | OUTPATIENT
Start: 2022-04-12 | End: 2022-04-12

## 2022-04-12 RX ORDER — POLYETHYLENE GLYCOL 3350 17 G/17G
17 POWDER, FOR SOLUTION ORAL DAILY
Status: DISCONTINUED | OUTPATIENT
Start: 2022-04-13 | End: 2022-04-13 | Stop reason: HOSPADM

## 2022-04-12 RX ORDER — ONDANSETRON 2 MG/ML
4 INJECTION INTRAMUSCULAR; INTRAVENOUS ONCE AS NEEDED
Status: DISCONTINUED | OUTPATIENT
Start: 2022-04-12 | End: 2022-04-12 | Stop reason: HOSPADM

## 2022-04-12 RX ORDER — CLOPIDOGREL BISULFATE 75 MG/1
75 TABLET ORAL DAILY
Status: DISCONTINUED | OUTPATIENT
Start: 2022-04-12 | End: 2022-04-13 | Stop reason: HOSPADM

## 2022-04-12 RX ADMIN — MIRTAZAPINE 15 MG: 15 TABLET, FILM COATED ORAL at 21:20

## 2022-04-12 RX ADMIN — CEFAZOLIN 2000 MG: 1 INJECTION, POWDER, FOR SOLUTION INTRAMUSCULAR; INTRAVENOUS at 10:40

## 2022-04-12 RX ADMIN — DEXAMETHASONE SODIUM PHOSPHATE 5 MG: 10 INJECTION, SOLUTION INTRAMUSCULAR; INTRAVENOUS at 10:40

## 2022-04-12 RX ADMIN — CHLORHEXIDINE GLUCONATE 15 ML: 1.2 SOLUTION ORAL at 08:04

## 2022-04-12 RX ADMIN — HEPARIN SODIUM 15000 UNITS: 1000 INJECTION INTRAVENOUS; SUBCUTANEOUS at 10:55

## 2022-04-12 RX ADMIN — LOSARTAN POTASSIUM 100 MG: 50 TABLET, FILM COATED ORAL at 15:29

## 2022-04-12 RX ADMIN — NICARDIPINE HYDROCHLORIDE 5 MG/HR: 2.5 INJECTION, SOLUTION INTRAVENOUS at 11:10

## 2022-04-12 RX ADMIN — PHENYLEPHRINE HYDROCHLORIDE 50 MCG/MIN: 10 INJECTION INTRAVENOUS at 10:24

## 2022-04-12 RX ADMIN — NICARDIPINE HYDROCHLORIDE 5 MG/HR: 2.5 INJECTION, SOLUTION INTRAVENOUS at 15:34

## 2022-04-12 RX ADMIN — NICARDIPINE HYDROCHLORIDE 5 MG/HR: 2.5 INJECTION, SOLUTION INTRAVENOUS at 19:38

## 2022-04-12 RX ADMIN — CARVEDILOL 6.25 MG: 6.25 TABLET, FILM COATED ORAL at 13:11

## 2022-04-12 RX ADMIN — HYDROCHLOROTHIAZIDE 25 MG: 25 TABLET ORAL at 15:29

## 2022-04-12 RX ADMIN — SODIUM CHLORIDE, SODIUM LACTATE, POTASSIUM CHLORIDE, AND CALCIUM CHLORIDE: .6; .31; .03; .02 INJECTION, SOLUTION INTRAVENOUS at 10:04

## 2022-04-12 RX ADMIN — DOCUSATE SODIUM 100 MG: 100 CAPSULE, LIQUID FILLED ORAL at 18:01

## 2022-04-12 RX ADMIN — SODIUM CHLORIDE: 0.9 INJECTION, SOLUTION INTRAVENOUS at 10:37

## 2022-04-12 RX ADMIN — FINASTERIDE 5 MG: 5 TABLET, FILM COATED ORAL at 15:29

## 2022-04-12 RX ADMIN — CARVEDILOL 6.25 MG: 6.25 TABLET, FILM COATED ORAL at 18:01

## 2022-04-12 RX ADMIN — INSULIN LISPRO 1 UNITS: 100 INJECTION, SOLUTION INTRAVENOUS; SUBCUTANEOUS at 18:02

## 2022-04-12 RX ADMIN — LIDOCAINE HYDROCHLORIDE 50 MG: 10 INJECTION, SOLUTION EPIDURAL; INFILTRATION; INTRACAUDAL; PERINEURAL at 10:22

## 2022-04-12 RX ADMIN — INSULIN LISPRO 1 UNITS: 100 INJECTION, SOLUTION INTRAVENOUS; SUBCUTANEOUS at 21:21

## 2022-04-12 RX ADMIN — SODIUM CHLORIDE, SODIUM GLUCONATE, SODIUM ACETATE, POTASSIUM CHLORIDE, MAGNESIUM CHLORIDE, SODIUM PHOSPHATE, DIBASIC, AND POTASSIUM PHOSPHATE 50 ML/HR: .53; .5; .37; .037; .03; .012; .00082 INJECTION, SOLUTION INTRAVENOUS at 11:30

## 2022-04-12 RX ADMIN — PROPOFOL 150 MG: 10 INJECTION, EMULSION INTRAVENOUS at 10:22

## 2022-04-12 RX ADMIN — ASPIRIN 81 MG: 81 TABLET, COATED ORAL at 15:29

## 2022-04-12 RX ADMIN — ROCURONIUM BROMIDE 40 MG: 50 INJECTION INTRAVENOUS at 10:26

## 2022-04-12 RX ADMIN — MUPIROCIN 1 APPLICATION: 20 OINTMENT TOPICAL at 21:20

## 2022-04-12 RX ADMIN — CEFAZOLIN SODIUM 2000 MG: 2 SOLUTION INTRAVENOUS at 18:01

## 2022-04-12 RX ADMIN — CHLORHEXIDINE GLUCONATE 15 ML: 1.2 SOLUTION ORAL at 18:01

## 2022-04-12 RX ADMIN — ONDANSETRON 4 MG: 2 INJECTION INTRAMUSCULAR; INTRAVENOUS at 10:40

## 2022-04-12 RX ADMIN — CLOPIDOGREL BISULFATE 75 MG: 75 TABLET ORAL at 15:29

## 2022-04-12 RX ADMIN — ESMOLOL HYDROCHLORIDE 50 MG: 100 INJECTION, SOLUTION INTRAVENOUS at 10:22

## 2022-04-12 RX ADMIN — PROTAMINE SULFATE 120 MG: 10 INJECTION, SOLUTION INTRAVENOUS at 11:04

## 2022-04-12 RX ADMIN — NEOSTIGMINE METHYLSULFATE 5 MG: 1 INJECTION INTRAVENOUS at 11:10

## 2022-04-12 RX ADMIN — Medication 5000 UNITS: at 15:29

## 2022-04-12 RX ADMIN — MUPIROCIN 1 APPLICATION: 20 OINTMENT TOPICAL at 08:04

## 2022-04-12 RX ADMIN — Medication 100 MG: at 10:23

## 2022-04-12 RX ADMIN — ATORVASTATIN CALCIUM 40 MG: 40 TABLET, FILM COATED ORAL at 15:29

## 2022-04-12 RX ADMIN — AMLODIPINE BESYLATE 5 MG: 5 TABLET ORAL at 15:29

## 2022-04-12 RX ADMIN — GLYCOPYRROLATE 1 MG: 0.2 INJECTION, SOLUTION INTRAMUSCULAR; INTRAVENOUS at 11:10

## 2022-04-12 NOTE — ANESTHESIA PROCEDURE NOTES
Arterial Line Insertion  Performed by: Hema Barclay MD  Authorized by: Hema Barclay MD   Consent: Verbal consent obtained  Written consent obtained  Risks and benefits: risks, benefits and alternatives were discussed  Consent given by: patient  Patient understanding: patient states understanding of the procedure being performed  Patient consent: the patient's understanding of the procedure matches consent given  Procedure consent: procedure consent matches procedure scheduled  Relevant documents: relevant documents present and verified  Patient identity confirmed: arm band  Time out: Immediately prior to procedure a "time out" was called to verify the correct patient, procedure, equipment, support staff and site/side marked as required  Preparation: Patient was prepped and draped in the usual sterile fashion    Indications: hemodynamic monitoring  Orientation:  Left  Location: radial artery  Procedure Details:  Needle gauge: 20  Seldinger technique: Seldinger technique used  Number of attempts: 4    Post-procedure:  Post-procedure: dressing applied  Waveform: good waveform  Patient tolerance: Patient tolerated the procedure well with no immediate complications  Comments: Procedure start 1032  Procedure end 1036

## 2022-04-12 NOTE — ANESTHESIA PREPROCEDURE EVALUATION
Procedure:  REPLACEMENT AORTIC VALVE TRANSCATHETER (TAVR) TRANSFEMORAL W/ 26MM LOPEZ CHUCK S3 ULTRA VALVE(ACCESS ON LEFT) LORIE (N/A Chest)  CARDIAC TAVR (N/A Chest)    Relevant Problems   CARDIO   (+) Atherosclerotic heart disease of native coronary artery without angina pectoris   (+) Benign essential hypertension   (+) Hyperlipidemia   (+) Nonrheumatic aortic valve stenosis      /RENAL   (+) BPH with obstruction/lower urinary tract symptoms      NEURO/PSYCH   (+) History of stroke        Physical Exam    Airway    Mallampati score: II         Dental   No notable dental hx     Cardiovascular      Pulmonary      Other Findings        Anesthesia Plan  ASA Score- 4     Anesthesia Type- general with ASA Monitors  Additional Monitors: arterial line and central venous line  Airway Plan: ETT  Comment: I, Dr Mehreen Hazel, the attending physician, have personally seen and evaluated the patient prior to anesthetic care  I have reviewed the pre-anesthetic record, and other medical records if appropriate to the anesthetic care  If a CRNA is involved in the case, I have reviewed the CRNA assessment, if present, and agree  The patient is in a suitable condition to proceed with my formulated anesthetic plan          Plan Factors-    Chart reviewed  Induction- intravenous  Postoperative Plan-     Informed Consent- Anesthetic plan and risks discussed with patient  I personally reviewed this patient with the CRNA  Discussed and agreed on the Anesthesia Plan with the CRNA  Karina Richards

## 2022-04-12 NOTE — ANESTHESIA PROCEDURE NOTES
Central Line Insertion  Performed by: Qian Dias MD  Authorized by: Qian Dias MD     Date/Time: 4/12/2022 10:31 AM  Catheter Type:  triple lumen  Consent: Written consent obtained  Risks and benefits: risks, benefits and alternatives were discussed  Consent given by: patient  Patient understanding: patient states understanding of the procedure being performed  Patient consent: the patient's understanding of the procedure matches consent given  Procedure consent: procedure consent matches procedure scheduled  Required items: required blood products, implants, devices, and special equipment available  Patient identity confirmed: arm band  Time out: Immediately prior to procedure a "time out" was called to verify the correct patient, procedure, equipment, support staff and site/side marked as required    Indications: vascular access and central pressure monitoring  Location details: right internal jugular  Patient position: Trendelenburg  Assessment: blood return through all ports and free fluid flow  Preparation: skin prepped with ChloraPrep  Skin prep agent dried: skin prep agent completely dried prior to procedure  Sterile barriers: all five maximum sterile barriers used - cap, mask, sterile gown, sterile gloves, and large sterile sheet  Hand hygiene: hand hygiene performed prior to central venous catheter insertion  Ultrasound guidance: yes  sterile gel and probe cover used in ultrasound-guided central venous catheter insertionultrasound permanent image saved  Pre-procedure: landmarks identified  Number of attempts: 1  Successful placement: yes  Post-procedure: chlorhexidine patch applied,  dressing applied and line sutured  Patient tolerance: patient tolerated the procedure well with no immediate complications  Comments: Procedure start 1025  Procedure end 1031

## 2022-04-12 NOTE — ANESTHESIA PROCEDURE NOTES
Procedure Performed: LORIE Anesthesia  Start Time:  4/12/2022 10:37 AM        Preanesthesia Checklist    Patient identified, IV assessed, risks and benefits discussed, monitors and equipment assessed, procedure being performed at surgeon's request and anesthesia consent obtained  Procedure    Diagnostic Indications for LORIE:  assessment of surgical repair and hemodynamic monitoring  Type of LORIE: interventional LORIE with real time guidance of intracardiac procedure  Images Saved: ultrasound permanent image saved  Physician Requesting Echo: Hazel Reina DO  Location performed: OR  Intubated  Bite block not placed  Heart visualized  Insertion of LORIE Probe:  Atraumatic  Probe Type:  Multiplane  Modalities:  3D, color flow mapping, continuous wave Doppler and pulse wave Doppler  Echocardiographic and Doppler Measurements    PREPROCEDURE    LEFT VENTRICLE:  Systolic Function: normal  Ejection Fraction: 65%  Cavity size: normal      RIGHT VENTRICLE:  Systolic Function: normal   Cavity size normal  No hypertrophy              AORTIC VALVE:  Leaflets: trileaflet  Leaflet motions restricted  Stenosis: severe  Regurgitation: none  MITRAL VALVE:  Leaflets: normal  Leaflet Motions: normal  Regurgitation: mild  Stenosis: none  TRICUSPID VALVE:  Leaflets: normal  Leaflet Motions: normal  Stenosis: none  Regurgitation: mild  PULMONIC VALVE:  Leaflets: normal  Regurgitation: none  Stenosis: none  ASCENDING AORTA:  Size:  normal  Dissection not present  AORTIC ARCH:  Size:  normal  dissection not present  Grade 2: severe intimal thickening without protruding atheroma  DESCENDING AORTA:  Size: normal  Dissection not present  Grade 2: severe intimal thickening without protruding atheroma          RIGHT ATRIUM:  Size:  normal  No spontaneous echo contrast     LEFT ATRIUM:  Size: normal  No spontaneous echo contrast     LEFT ATRIAL APPENDAGE:  Size: normal  No spontaneous echo contrast ATRIAL SEPTUM:  Intra-atrial septal morphology: normal          VENTRICULAR SEPTUM:  Intra-ventricular septum morphology: normal          EPIAORTIC:  Plaque Thickness: 0-5 mm  OTHER FINDINGS:  Pericardium:  normal  Pleural Effusion:  none  POSTPROCEDURE    LEFT VENTRICLE: Unchanged   RIGHT VENTRICLE: Unchanged   AORTIC VALVE:   Leaflets: bioprosthetic  Stenosis: none  Regurgitation: none  Valve Size: 26 mm  MITRAL VALVE: Unchanged   TRICUSPID VALVE: Unchanged   PULMONIC VALVE: Unchanged           ATRIA: Unchanged   AORTA: Unchanged   REMOVAL:  Probe Removal: atraumatic

## 2022-04-12 NOTE — OP NOTE
OPERATIVE REPORT  PATIENT NAME: Ana Laura Jones    :  1945  MRN: [id number]  Pt Location: [facility] OR [facility]OM 02    SURGERY DATE: 2022    SURGEON: Vazquez Burgess DO    CO-SURGEON: Lluvia Verduzco DO    ASSISTANT: Shirin Pedroza PA-C    ADDITIONAL ASSISTANT: N/A    PREOPERATIVE DIAGNOSIS: Symptomatic severe aortic stenosis  POSTOPERATIVE DIAGNOSIS: Symptomatic severe aortic stenosis  NYHA Class: 3  CCS Class: 3    PROCEDURE:   Transcatheter aortic valve replacement with a 26 mm Plaza CHUCK 3 ULTRA bioprosthetic valve via left transfemoral approach  CARDIOPULMONARY BYPASS TIME: 0 minutes  ANESTHESIA Dr Hema Barclay, general endotracheal anesthesia with transesophageal echocardiogram guidance  INDICATIONS:  The patient is a 64y o  year-old male with clinical and echocardiographic findings consistent with severe aortic stenosis  FINDINGS:  1  Intraoperative transesophageal echocardiogram revealed severe aortic stenosis  2  Final transesophageal echocardiogram demonstrated prosthetic valve with normal function trace perivalvular leak  OPERATIVE TECHNIQUE:    The patient was taken to the operating room and placed supine on the operating table  Following the satisfactory induction of general anesthesia and placement of monitoring lines, the patient was prepped and draped in the usual sterile fashion  A time-out procedure was performed  The left common femoral artery and left common femoral vein were accessed percutaneously using Seldinger technique and fluoroscopy  Through the left common femoral vein sheath, a balloon-tip temporary pacing catheter was inserted and advanced into the right ventricle and its capture was confirmed  The left common femoral artery was accessed percutaneously using Seldinger technique and fluoroscopy  Two (2) Perclose sutures were deployed in the standard fashion  The patient was systemically heparinized   A pigtail catheter was inserted and advanced to the right coronary cusp, an aortogram was performed to determine proper angle and orientation for valve deployment  The left common femoral artery was then accessed, a BlueShift Labserquist extra-stiff wire was positioned in the ascending aorta over an exchange catheter  The sheath for the delivery system was inserted through the left common femoral artery and advanced into the aorta  A 6 Afghan Hans right 4 coronary catheter was advanced through the delivery sheath to the aortic valve  The aortic valve was crossed with a 0 035 straight-tip wire, the right coronary catheter was advanced into the left ventricular cavity, this was then exchanged for a curved tip Amplatzer extra stiff wire  A 26 mm CHUCK 3 ULTRA valve delivery system was advanced through the delivery sheath into the aorta, the delivery system was configured for deployment  The valve on the delivery system was then advanced and the aortic valve was crossed  At this point, the catheter was desheathed in the standard fashion  The valve was positioned appropriately using a combination of fluoroscopy and transesophageal echocardiogram guidance  During an episode of rapid pacing, balloon deployment of the valve was performed  Following deployment, the position of the valve was confirmed by fluoroscopy and echocardiography and its position appeared appropriate with trace perivalvular leak  The valve delivery system was subsequently removed followed by removal of the sheath while the Perclose sutures were secured and direct pressure was held  Protamine was administered with normalization of the ACT  Pressure was released, without evidence of active bleeding  The left common femoral artery sheath was removed followed by deployment of a closure device  The left common femoral vein sheath was removed and pressure was held  COMPLICATIONS: None    PACKS/TUBES/DRAINS: None  EBL: Minimal     TRANSFUSION: None       SPECIMENS: None      As the attending surgeon, I was present and scrubbed for all critical portions of this procedure  There was no qualified surgical resident available  Sponge, needle and instrument counts were reported as correct by the nursing staff       SIGNATURE: Alessia Mary DO  DATE: April 12, 2022  TIME: 11:18 AM

## 2022-04-12 NOTE — INTERVAL H&P NOTE
H&P reviewed  After examining the patient I find no changes in the patients condition since the H&P had been written  Vitals:    04/12/22 0818   BP: 148/80   Pulse: 74   Resp: 18   Temp: 97 9 °F (36 6 °C)   SpO2: 98%     Anticipated Length of Stay:  Patient will be admitted on an Inpatient basis with an anticipated length of stay of  greater than 2 midnights  Justification for Hospital Stay:  Post surgical recovery following open heart surgery

## 2022-04-13 ENCOUNTER — TRANSITIONAL CARE MANAGEMENT (OUTPATIENT)
Dept: INTERNAL MEDICINE CLINIC | Facility: CLINIC | Age: 77
End: 2022-04-13

## 2022-04-13 ENCOUNTER — APPOINTMENT (INPATIENT)
Dept: NON INVASIVE DIAGNOSTICS | Facility: HOSPITAL | Age: 77
DRG: 267 | End: 2022-04-13
Payer: MEDICARE

## 2022-04-13 ENCOUNTER — APPOINTMENT (INPATIENT)
Dept: RADIOLOGY | Facility: HOSPITAL | Age: 77
DRG: 267 | End: 2022-04-13
Payer: MEDICARE

## 2022-04-13 VITALS
RESPIRATION RATE: 19 BRPM | TEMPERATURE: 97.7 F | HEIGHT: 70 IN | WEIGHT: 234 LBS | HEART RATE: 81 BPM | BODY MASS INDEX: 33.5 KG/M2 | OXYGEN SATURATION: 97 % | SYSTOLIC BLOOD PRESSURE: 130 MMHG | DIASTOLIC BLOOD PRESSURE: 76 MMHG

## 2022-04-13 LAB
ABO GROUP BLD BPU: NORMAL
ANION GAP SERPL CALCULATED.3IONS-SCNC: 4 MMOL/L (ref 4–13)
AORTIC ROOT: 3.2 CM
AORTIC VALVE MEAN VELOCITY: 15.8 M/S
ASCENDING AORTA: 3.5 CM (ref 2.18–3.27)
AV AREA BY CONTINUOUS VTI: 2.1 CM2
AV AREA PEAK VELOCITY: 1.7 CM2
AV LVOT MEAN GRADIENT: 4 MMHG
AV LVOT PEAK GRADIENT: 6 MMHG
AV MEAN GRADIENT: 12 MMHG
AV PEAK GRADIENT: 21 MMHG
AV VALVE AREA: 2.07 CM2
AV VELOCITY RATIO: 0.53
BPU ID: NORMAL
BUN SERPL-MCNC: 20 MG/DL (ref 5–25)
CALCIUM SERPL-MCNC: 8.6 MG/DL (ref 8.3–10.1)
CHLORIDE SERPL-SCNC: 103 MMOL/L (ref 100–108)
CO2 SERPL-SCNC: 29 MMOL/L (ref 21–32)
CREAT SERPL-MCNC: 0.97 MG/DL (ref 0.6–1.3)
CROSSMATCH: NORMAL
DOP CALC AO PEAK VEL: 2.29 M/S
DOP CALC AO VTI: 45.62 CM
DOP CALC LVOT AREA: 3.14 CM2
DOP CALC LVOT DIAMETER: 2 CM
DOP CALC LVOT PEAK VEL VTI: 30.12 CM
DOP CALC LVOT PEAK VEL: 1.22 M/S
DOP CALC LVOT STROKE INDEX: 43 ML/M2
DOP CALC LVOT STROKE VOLUME: 94.58 CM3
E WAVE DECELERATION TIME: 293 MS
ERYTHROCYTE [DISTWIDTH] IN BLOOD BY AUTOMATED COUNT: 12.8 % (ref 11.6–15.1)
FRACTIONAL SHORTENING: 40 % (ref 28–44)
GFR SERPL CREATININE-BSD FRML MDRD: 74 ML/MIN/1.73SQ M
GLUCOSE SERPL-MCNC: 125 MG/DL (ref 65–140)
GLUCOSE SERPL-MCNC: 137 MG/DL (ref 65–140)
GLUCOSE SERPL-MCNC: 138 MG/DL (ref 65–140)
HCT VFR BLD AUTO: 38.1 % (ref 36.5–49.3)
HGB BLD-MCNC: 13.3 G/DL (ref 12–17)
INTERVENTRICULAR SEPTUM IN DIASTOLE (PARASTERNAL SHORT AXIS VIEW): 0.8 CM
INTERVENTRICULAR SEPTUM: 0.8 CM (ref 0.57–1.06)
LAAS-AP4: 21.6 CM2
LEFT ATRIUM SIZE: 4.7 CM
LEFT INTERNAL DIMENSION IN SYSTOLE: 3.1 CM (ref 4.53–6.87)
LEFT VENTRICULAR INTERNAL DIMENSION IN DIASTOLE: 5.2 CM (ref 7.58–11.3)
LEFT VENTRICULAR POSTERIOR WALL IN END DIASTOLE: 1 CM (ref 0.55–1.05)
LEFT VENTRICULAR STROKE VOLUME: 88 ML
LVSV (TEICH): 88 ML
MAGNESIUM SERPL-MCNC: 2 MG/DL (ref 1.6–2.6)
MCH RBC QN AUTO: 32.5 PG (ref 26.8–34.3)
MCHC RBC AUTO-ENTMCNC: 34.9 G/DL (ref 31.4–37.4)
MCV RBC AUTO: 93 FL (ref 82–98)
MV E'TISSUE VEL-SEP: 6 CM/S
MV PEAK A VEL: 1.07 M/S
MV PEAK E VEL: 65 CM/S
MV STENOSIS PRESSURE HALF TIME: 85 MS
MV VALVE AREA P 1/2 METHOD: 2.59 CM2
PLATELET # BLD AUTO: 160 THOUSANDS/UL (ref 149–390)
PMV BLD AUTO: 9.6 FL (ref 8.9–12.7)
POTASSIUM SERPL-SCNC: 3.3 MMOL/L (ref 3.5–5.3)
RBC # BLD AUTO: 4.09 MILLION/UL (ref 3.88–5.62)
RIGHT ATRIUM AREA SYSTOLE A4C: 25.7 CM2
RIGHT VENTRICLE ID DIMENSION: 3.8 CM
SL CV LV EF: 65
SL CV PED ECHO LEFT VENTRICLE DIASTOLIC VOLUME (MOD BIPLANE) 2D: 127 ML
SL CV PED ECHO LEFT VENTRICLE SYSTOLIC VOLUME (MOD BIPLANE) 2D: 39 ML
SODIUM SERPL-SCNC: 136 MMOL/L (ref 136–145)
UNIT DISPENSE STATUS: NORMAL
UNIT PRODUCT CODE: NORMAL
UNIT PRODUCT VOLUME: 350 ML
UNIT RH: NORMAL
WBC # BLD AUTO: 10.17 THOUSAND/UL (ref 4.31–10.16)
Z-SCORE OF ASCENDING AORTA: 2.82
Z-SCORE OF INTERVENTRICULAR SEPTUM IN END DIASTOLE: -0.1
Z-SCORE OF LEFT VENTRICULAR DIMENSION IN END DIASTOLE: -5.77
Z-SCORE OF LEFT VENTRICULAR DIMENSION IN END SYSTOLE: -4.64
Z-SCORE OF LEFT VENTRICULAR POSTERIOR WALL IN END DIASTOLE: 1.6

## 2022-04-13 PROCEDURE — 82948 REAGENT STRIP/BLOOD GLUCOSE: CPT

## 2022-04-13 PROCEDURE — NC001 PR NO CHARGE: Performed by: PHYSICIAN ASSISTANT

## 2022-04-13 PROCEDURE — 99222 1ST HOSP IP/OBS MODERATE 55: CPT | Performed by: INTERNAL MEDICINE

## 2022-04-13 PROCEDURE — 71045 X-RAY EXAM CHEST 1 VIEW: CPT

## 2022-04-13 PROCEDURE — 83735 ASSAY OF MAGNESIUM: CPT | Performed by: PHYSICIAN ASSISTANT

## 2022-04-13 PROCEDURE — 93306 TTE W/DOPPLER COMPLETE: CPT | Performed by: INTERNAL MEDICINE

## 2022-04-13 PROCEDURE — 99238 HOSP IP/OBS DSCHRG MGMT 30/<: CPT | Performed by: THORACIC SURGERY (CARDIOTHORACIC VASCULAR SURGERY)

## 2022-04-13 PROCEDURE — 97163 PT EVAL HIGH COMPLEX 45 MIN: CPT

## 2022-04-13 PROCEDURE — 97166 OT EVAL MOD COMPLEX 45 MIN: CPT

## 2022-04-13 PROCEDURE — C8929 TTE W OR WO FOL WCON,DOPPLER: HCPCS

## 2022-04-13 PROCEDURE — 80048 BASIC METABOLIC PNL TOTAL CA: CPT | Performed by: PHYSICIAN ASSISTANT

## 2022-04-13 PROCEDURE — 85027 COMPLETE CBC AUTOMATED: CPT | Performed by: PHYSICIAN ASSISTANT

## 2022-04-13 RX ORDER — DOCUSATE SODIUM 100 MG/1
100 CAPSULE, LIQUID FILLED ORAL 2 TIMES DAILY
Status: CANCELLED | OUTPATIENT
Start: 2022-04-13

## 2022-04-13 RX ORDER — TORSEMIDE 10 MG/1
10 TABLET ORAL DAILY
Qty: 5 TABLET | Refills: 0 | Status: SHIPPED | OUTPATIENT
Start: 2022-04-13 | End: 2022-04-28 | Stop reason: ALTCHOICE

## 2022-04-13 RX ORDER — ACETAMINOPHEN 325 MG/1
650 TABLET ORAL EVERY 6 HOURS PRN
Status: CANCELLED | OUTPATIENT
Start: 2022-04-13

## 2022-04-13 RX ORDER — CARVEDILOL 12.5 MG/1
12.5 TABLET ORAL 2 TIMES DAILY WITH MEALS
Qty: 60 TABLET | Refills: 2 | Status: SHIPPED | OUTPATIENT
Start: 2022-04-13 | End: 2022-04-28 | Stop reason: SDUPTHER

## 2022-04-13 RX ORDER — POTASSIUM CHLORIDE 20 MEQ/1
40 TABLET, EXTENDED RELEASE ORAL ONCE
Status: COMPLETED | OUTPATIENT
Start: 2022-04-13 | End: 2022-04-13

## 2022-04-13 RX ORDER — CARVEDILOL 12.5 MG/1
12.5 TABLET ORAL 2 TIMES DAILY WITH MEALS
Status: DISCONTINUED | OUTPATIENT
Start: 2022-04-13 | End: 2022-04-13 | Stop reason: HOSPADM

## 2022-04-13 RX ORDER — HEPARIN SODIUM 5000 [USP'U]/ML
5000 INJECTION, SOLUTION INTRAVENOUS; SUBCUTANEOUS EVERY 8 HOURS SCHEDULED
Status: CANCELLED | OUTPATIENT
Start: 2022-04-13

## 2022-04-13 RX ORDER — POTASSIUM CHLORIDE 750 MG/1
10 TABLET, EXTENDED RELEASE ORAL DAILY
Qty: 5 TABLET | Refills: 0 | Status: SHIPPED | OUTPATIENT
Start: 2022-04-13 | End: 2022-04-28 | Stop reason: ALTCHOICE

## 2022-04-13 RX ORDER — ONDANSETRON 2 MG/ML
4 INJECTION INTRAMUSCULAR; INTRAVENOUS EVERY 6 HOURS PRN
Status: CANCELLED | OUTPATIENT
Start: 2022-04-13 | End: 2022-04-14

## 2022-04-13 RX ORDER — SENNOSIDES 8.6 MG
650 CAPSULE ORAL EVERY 8 HOURS PRN
Qty: 30 TABLET | Refills: 0 | Status: SHIPPED | OUTPATIENT
Start: 2022-04-13 | End: 2022-05-13

## 2022-04-13 RX ORDER — PANTOPRAZOLE SODIUM 40 MG/1
40 TABLET, DELAYED RELEASE ORAL DAILY
Status: CANCELLED | OUTPATIENT
Start: 2022-04-13

## 2022-04-13 RX ADMIN — PERFLUTREN 0.4 ML/MIN: 6.52 INJECTION, SUSPENSION INTRAVENOUS at 07:45

## 2022-04-13 RX ADMIN — CEFAZOLIN SODIUM 2000 MG: 2 SOLUTION INTRAVENOUS at 02:40

## 2022-04-13 RX ADMIN — MIRTAZAPINE 15 MG: 15 TABLET, FILM COATED ORAL at 08:27

## 2022-04-13 RX ADMIN — LOSARTAN POTASSIUM 100 MG: 50 TABLET, FILM COATED ORAL at 09:05

## 2022-04-13 RX ADMIN — AMLODIPINE BESYLATE 5 MG: 5 TABLET ORAL at 08:27

## 2022-04-13 RX ADMIN — CHLORHEXIDINE GLUCONATE 15 ML: 1.2 SOLUTION ORAL at 08:27

## 2022-04-13 RX ADMIN — HYDROCHLOROTHIAZIDE 25 MG: 25 TABLET ORAL at 08:28

## 2022-04-13 RX ADMIN — ASPIRIN 81 MG: 81 TABLET, COATED ORAL at 08:27

## 2022-04-13 RX ADMIN — PANTOPRAZOLE SODIUM 40 MG: 40 TABLET, DELAYED RELEASE ORAL at 05:28

## 2022-04-13 RX ADMIN — DOCUSATE SODIUM 100 MG: 100 CAPSULE, LIQUID FILLED ORAL at 08:27

## 2022-04-13 RX ADMIN — POTASSIUM CHLORIDE 40 MEQ: 1500 TABLET, EXTENDED RELEASE ORAL at 11:21

## 2022-04-13 RX ADMIN — CEFAZOLIN SODIUM 2000 MG: 2 SOLUTION INTRAVENOUS at 11:21

## 2022-04-13 RX ADMIN — Medication 5000 UNITS: at 08:27

## 2022-04-13 RX ADMIN — HEPARIN SODIUM 5000 UNITS: 5000 INJECTION INTRAVENOUS; SUBCUTANEOUS at 05:28

## 2022-04-13 RX ADMIN — POLYETHYLENE GLYCOL 3350 17 G: 17 POWDER, FOR SOLUTION ORAL at 08:27

## 2022-04-13 RX ADMIN — FINASTERIDE 5 MG: 5 TABLET, FILM COATED ORAL at 08:27

## 2022-04-13 RX ADMIN — CARVEDILOL 12.5 MG: 12.5 TABLET, FILM COATED ORAL at 08:27

## 2022-04-13 RX ADMIN — ATORVASTATIN CALCIUM 40 MG: 40 TABLET, FILM COATED ORAL at 08:27

## 2022-04-13 RX ADMIN — NICARDIPINE HYDROCHLORIDE 5 MG/HR: 2.5 INJECTION, SOLUTION INTRAVENOUS at 06:20

## 2022-04-13 RX ADMIN — MUPIROCIN 1 APPLICATION: 20 OINTMENT TOPICAL at 08:28

## 2022-04-13 RX ADMIN — CLOPIDOGREL BISULFATE 75 MG: 75 TABLET ORAL at 08:28

## 2022-04-13 NOTE — DISCHARGE SUMMARY
Discharge Summary - Cardiothoracic Surgery   Nayeli Ruiz 64 y o  male MRN: [id number]  [facility]: [id number]    Admission Date: 4/12/2022     Discharge Date: 04/13/22    Admitting Diagnosis: Nonrheumatic aortic valve stenosis [I35 0]    Primary Discharge Diagnosis:   Aortic stenosis, Non-Rheumatic  S/P transfemoral transcatheter aortic valve replacement;    Secondary Discharge Diagnosis:   CAD (s/p CABG x2 2004 w/ COLTON to LAD & SVG to RCA), CHF, HTN, HL, h/o CVA (w/ residual left-sided weakness), chronic rhinitis, insomnia, gait instability, BPH, pulm nodule, former tobacco use, arthritis, vitamin D deficiency       Attending: THOMAS [facility]  Consulting Physician(s):   Cardiology  Medical/Critical Care  Gerontology    Procedures Performed:   Procedure(s):  REPLACEMENT AORTIC VALVE TRANSCATHETER (TAVR) TRANSFEMORAL W/ 26MM LOPEZ CHUCK S3 ULTRA VALVE(ACCESS ON LEFT) LORIE  CARDIAC TAVR     Hospital Course: The patient was seen in consultation prior to this admission for evaluation of Aortic stenosis, Non-Rheumatic  Risks and benefits of transfemoral transcatheter aortic valve replacement were discussed in detail, and patient was agreeable  Routine preoperative evaluation was completed and informed consent was obtained prior to admission  4/12: Elective admission for TAVR #26mm via L approach  Trace PVL on intra-op LORIE s/p valve deployment  Extubated and transferred to PACU supported with cardene 7, EKG w/ NSR & table 1st AVB ( now,  PTA), resume home Coreg 6 25mg/Norvasc 5mg/Benicar 40-25mg  DP pulses 2+ b/l  Gerontology and cardiology consulted      4/13: NSR,, sats good on RA, remains on cardene 5, increase Coreg, continue losartan, HCTZ, and norvasc  ECHO this am  ASA/plavix  Labs stable  Fit for discharge to home today         Condition at Discharge:   good     Discharge Physical Exam:    Please see the documented physical exam from this morning's progress note for details  Discharge Data:  Results from last 7 days   Lab Units 04/13/22  0514 04/12/22  1129 04/12/22  1101   WBC Thousand/uL 10 17*  --   --    HEMOGLOBIN g/dL 13 3 13 4  --    I STAT HEMOGLOBIN g/dl  --   --  11 9*   HEMATOCRIT % 38 1 38 2  --    HEMATOCRIT, ISTAT %  --   --  35*   PLATELETS Thousands/uL 160 175  --      Results from last 7 days   Lab Units 04/13/22  0514 04/12/22  1129 04/12/22  1101   POTASSIUM mmol/L 3 3* 3 7  --    CHLORIDE mmol/L 103 109*  --    CO2 mmol/L 29 28  --    CO2, I-STAT mmol/L  --   --  29   BUN mg/dL 20 26*  --    CREATININE mg/dL 0 97 1 02  --    GLUCOSE, ISTAT mg/dl  --   --  122   CALCIUM mg/dL 8 6 8 7  --            Discharge instructions/Information to patient and family:   See after visit summary for information provided to patient and family  Lavelle Bradley was educated on restrictions regarding driving and lifting, and techniques of proper incisional care  They were specifically counselled on signs and symptoms of an incisional infection, and advised to contact our service immediately should they develop fevers, sweats, chill, redness or drainage at the site of any incisions  Provisions for Follow-Up Care:  See after visit summary for information related to follow-up care and any pertinent home health orders  Disposition:  Home    Planned Readmission:   No    Discharge Medications:  See after visit summary for reconciled discharge medications provided to patient and family  Lavelle Bradley was provided contact information and scheduled a follow up appointment with THOMAS Calabrese  Additionally, follow up appointments have been scheduled for their primary care physician and primary cardiologist   Contact information was provided  Upon admission, troponins were Not checked    Lavelle Bradley was counseled on the importance of avoiding tobacco products    As with all patients whom have undergone open heart surgery, tobacco cessation medication was contraindicated at the time of discharge  ACE/ARB was Prescribed at discharge    Beta Blocker was Prescribed at discharge      The patient was discharged on ongoing diuretic therapy with Torsemide 10 mg, PO QD and Potassium Chloride 10 mEq, PO QD  They were advised to continue these medications for 5 days, unless otherwise directed  The patient was informed that following their postoperative surgical evaluation, they will be referred to outpatient cardiac rehabilitation  They were counseled that this program is run by specialists who will help them safely strengthen their heart and prevent more heart disease  Cardiac rehabilitation will include exercise, relaxation, stress management, and heart-healthy nutrition  Caregivers will also check to make sure their medication regimen is working  During this admission, the patient was questioned on their use of tobacco, alcohol, and illicit/non-prescription drug use in the  previous 24 months  Mark Maker states that they have not used any of these substances in this time frame  I spent 20 minutes discharging the patient  This time was spent on the day of discharge  I had direct contact with the patient on the day of discharge  Additional documentation is required if more than 30 minutes were spent on discharge       SIGNATURE: Brandon Anand PA-C  DATE: April 13, 2022  TIME: 11:39 AM

## 2022-04-13 NOTE — PROGRESS NOTES
Progress Note - Cardiothoracic Surgery   Shannan Ascencio 64 y o  male MRN: [id number]  Unit/Bed#: [facility] 412-01 Encounter: [id number]    Aortic stenosis, Non-Rheumatic  S/P transfemoral transcatheter aortic valve replacement; POD # 1    24 Hour Events: No issues overnight  C/o not sleeping  Remains on cardene 5   ECHO being done this am    Medications:   Scheduled Meds:  Current Facility-Administered Medications   Medication Dose Route Frequency Provider Last Rate    acetaminophen  650 mg Rectal Q4H PRN Misty Wing PA-C      acetaminophen  650 mg Oral Q4H PRN Misty Wing PA-C      amLODIPine  5 mg Oral Daily Misty Wing PA-C      aspirin  81 mg Oral Daily Misty Wing PA-C      atorvastatin  40 mg Oral Daily Misty Wing PA-C      bisacodyl  10 mg Rectal Daily PRN Misty Wing PA-C      carvedilol  6 25 mg Oral BID Misty Wing PA-C      cefazolin  2,000 mg Intravenous Q8H Misty Wing PA-C 2,000 mg (04/13/22 0240)    chlorhexidine  15 mL Mouth/Throat BID Misty Wing PA-C      cholecalciferol  5,000 Units Oral Daily Misty Wing PA-C      clopidogrel  75 mg Oral Daily Misty Wing PA-C      docusate sodium  100 mg Oral BID Misty Wing PA-C      finasteride  5 mg Oral Daily Misty Wing PA-C      heparin (porcine)  5,000 Units Subcutaneous Q8H [address] Misty Wing PA-C      losartan  100 mg Oral Daily Misty Wing PA-C      And    hydrochlorothiazide  25 mg Oral Daily Misty Wing PA-C      insulin lispro  1-6 Units Subcutaneous TID AC Misty Wing PA-C      insulin lispro  1-6 Units Subcutaneous HS Misty Wing PA-C      mirtazapine  15 mg Oral Daily Misty Wing PA-C      mupirocin  1 application Nasal [id number] [address] Misty Wing PA-C      niCARdipine  1-15 mg/hr Intravenous Titrated Misty Wing PA-C 5 mg/hr (04/13/22 0620)    ondansetron  4 mg Intravenous Q6H PRN Misty Wing PA-C      pantoprazole  40 mg Oral Early Morning Misty CHRISTIANA Wing      polyethylene glycol  17 g Oral Daily Misty Wing PA-C       Continuous Infusions:niCARdipine, 1-15 mg/hr, Last Rate: 5 mg/hr (04/13/22 0620)      PRN Meds:   acetaminophen    acetaminophen    bisacodyl    ondansetron    Vitals:   Vitals:    04/13/22 0551 04/13/22 0600 04/13/22 0614 04/13/22 0642   BP:  127/83 132/63 112/60   BP Location:    Right arm   Pulse:  74  73   Resp:    19   Temp:    97 7 °F (36 5 °C)   TempSrc:    Oral   SpO2:    94%   Weight: 106 kg (234 lb 5 6 oz)      Height:           Telemetry: NSR; Heart Rate: 60s    Hemodynamics:       Respiratory:   SpO2: SpO2: 94 %; Room Air    Intake/Output:     Intake/Output Summary (Last 24 hours) at 4/13/2022 0711  Last data filed at 4/13/2022 0043  Gross per 24 hour   Intake 1320 ml   Output 1250 ml   Net 70 ml        Weights:   Weight (last 2 days)     Date/Time Weight    04/13/22 0551 106 (234 35)    04/12/22 0818 104 (228 7)        Admit weight: 104kg    Results:   Results from last 7 days   Lab Units 04/13/22  0514 04/12/22  1129 04/12/22  1101   WBC Thousand/uL 10 17*  --   --    HEMOGLOBIN g/dL 13 3 13 4  --    I STAT HEMOGLOBIN g/dl  --   --  11 9*   HEMATOCRIT % 38 1 38 2  --    HEMATOCRIT, ISTAT %  --   --  35*   PLATELETS Thousands/uL 160 175  --      Results from last 7 days   Lab Units 04/13/22  0514 04/12/22  1129 04/12/22  1101   POTASSIUM mmol/L 3 3* 3 7  --    CHLORIDE mmol/L 103 109*  --    CO2 mmol/L 29 28  --    CO2, I-STAT mmol/L  --   --  29   BUN mg/dL 20 26*  --    CREATININE mg/dL 0 97 1 02  --    GLUCOSE, ISTAT mg/dl  --   --  122   CALCIUM mg/dL 8 6 8 7  --          Point of care glucose: 111-167    Studies:  CXR:     EKG: NSR    I have personally reviewed pertinent reports     and I have personally reviewed pertinent films in PACS    Invasive Lines/Tubes:  Invasive Devices  Report    Central Venous Catheter Line            CVC Central Lines 04/12/22 Triple <1 day          Peripheral Intravenous Line Peripheral IV 04/12/22 Left Wrist <1 day          Arterial Line            Arterial Line 04/12/22 Left Radial <1 day                Physical Exam:    HEENT/NECK:  Normocephalic  Atraumatic  No jugular venous distention  Cardiac: Regular rate and rhythm and No murmurs/rubs/gallops  Pulmonary:  Breath sounds clear bilaterally and No rales/rhonchi/wheezes  Abdomen:  Non-tender, Non-distended and Normal bowel sounds  Incisions: Groin puncture sites clean, dry, and intact without hematoma  Extremities: Extremities warm/dry, Radial pulses 2+ bilaterally and No edema B/L  Neuro: Alert and oriented X 3, Sensation is grossly intact and No focal deficits  Skin: Warm/Dry, without rashes or lesions  Assessment:  Principal Problem:    Severe aortic stenosis  Active Problems:    S/P TAVR (transcatheter aortic valve replacement)    Hyperchloremia       Aortic stenosis, Non-Rheumatic  S/P transfemoral transcatheter aortic valve replacement; POD # 1    Plan:    1  Cardiac:   NSR; HR/BP well-controlled currently on cardene 5, wean as tolerated   Continue Norvasc, increase Coreg to 12 5 BID, Continue Losartan and HCTZ  Maintain central IV access today for today  ASA/Plavix  Consult cardiology for postoperative medical management  Follow up transthoracic echocardiogram today  Continue Statin therapy  Continue DVT prophylaxis    2  Pulmonary:   Extubated  CXR findings: central line in place, wires intact from previous CABG, no effusion or PTX seen   Good Room air oxygen saturation; Continue incentive spirometry/Coughing/Deep breathing exercises    3  Renal:   Intake/Output net: 1250 mL/24 hours  Post op Creatinine stable; Follow up labs prn    4  Neuro:  Neurologically intact; No active issues  Incisional pain well-controlled; Continue prn Tylenol    5  GI:  Tolerating clear liquid diet, without evidence of dysphagia;  Initiate TLC 2 3 gm sodium diet with consistent carbohydrate modifier  Maintain 1800 mL daily fluid restriction   Continue daily stool softeners and prn suppository  Continue GI prophylaxis    6  Endo:   Glucose well-controlled  Continue Insulin sliding scale coverage    7  Hematology:    Post-operative blood count acceptable; Trend prn    8     Disposition:   Gerontology consultation already completed for routine assessment of TAVR patient over 72years old  Transfer from ICU to telemetry today    VTE Pharmacologic Prophylaxis: Heparin  VTE Mechanical Prophylaxis: sequential compression device    Bedside rounds completed with supervising physician  Plan of care discussed with bedside nurse    SIGNATURE: Roxie Lobo PA-C  DATE: April 13, 2022  TIME: 7:11 AM

## 2022-04-13 NOTE — UTILIZATION REVIEW
Initial Clinical Review    Elective Inpatient surgical procedure  Age/Sex: 64 y o  male  Surgery Date: 04/12/2022  Procedure: Transcatheter aortic valve replacement with a 26 mm Plaza CHUCK 3 ULTRA bioprosthetic valve via left transfemoral approach  Anesthesia: General endotracheal anesthesia with transesophageal echocardiogram guidance  Operative Findings: 1  Intraoperative transesophageal echocardiogram revealed severe aortic stenosis  2  Final transesophageal echocardiogram demonstrated prosthetic valve with normal function trace perivalvular leak  POD#1 Progress Note (04/13/2022) : Aortic stenosis, Non-Rheumatic  S/P transfemoral transcatheter aortic valve replacement  C/o not sleeping  Remains on cardene 5  ECHO being done this am   Maintain Triple CVC Line, Left radial A Line  Continue ASA / Plavix / Statin therapy  Tolerating clear liquid diet, without evidence of dysphagia; Initiate TLC 2 3 gm sodium diet with consistent carbohydrate modifier  Maintain 1800 mL daily fluid restriction  Admission Orders: Date/Time/Statement:   Admission Orders (From admission, onward)     Ordered        04/12/22 0929  Inpatient Admission  Once                      Orders Placed This Encounter   Procedures    Inpatient Admission     Standing Status:   Standing     Number of Occurrences:   1     Order Specific Question:   Level of Care     Answer:   Level 1 Stepdown [13]     Order Specific Question:   Estimated length of stay     Answer:   More than 2 Midnights     Order Specific Question:   Certification     Answer:   I certify that inpatient services are medically necessary for this patient for a duration of greater than two midnights  See H&P and MD Progress Notes for additional information about the patient's course of treatment       Vital Signs: /58   Pulse 73   Temp 97 7 °F (36 5 °C) (Oral)   Resp 19   Ht 5' 10" (1 778 m)   Wt 106 kg (234 lb 5 6 oz)   SpO2 94%   BMI 33 63 kg/m² Pertinent Labs/Diagnostic Test Results:   XR chest portable ICU   Final Result by Keila Newton MD (04/12 1257)   No acute cardiopulmonary disease  Post TAVR        XR chest portable    (Results Pending)     Results from last 7 days   Lab Units 04/13/22  0514 04/12/22  1129 04/12/22  1101 04/12/22  1034   WBC Thousand/uL 10 17*  --   --   --    HEMOGLOBIN g/dL 13 3 13 4  --   --    I STAT HEMOGLOBIN g/dl  --   --  11 9* 13 3   HEMATOCRIT % 38 1 38 2  --   --    HEMATOCRIT, ISTAT %  --   --  35* 39   PLATELETS Thousands/uL 160 175  --   --      Results from last 7 days   Lab Units 04/13/22  0514 04/12/22  1129 04/12/22  1101 04/12/22  1034   SODIUM mmol/L 136 141  --   --    POTASSIUM mmol/L 3 3* 3 7  --   --    CHLORIDE mmol/L 103 109*  --   --    CO2 mmol/L 29 28  --   --    CO2, I-STAT mmol/L  --   --  29 32   ANION GAP mmol/L 4 4  --   --    BUN mg/dL 20 26*  --   --    CREATININE mg/dL 0 97 1 02  --   --    EGFR ml/min/1 73sq m 74 70  --   --    CALCIUM mg/dL 8 6 8 7  --   --    CALCIUM, IONIZED, ISTAT mmol/L  --   --  1 20 1 26   MAGNESIUM mg/dL 2 0  --   --   --      Results from last 7 days   Lab Units 04/12/22  2113 04/12/22  1554 04/12/22  1128   POC GLUCOSE mg/dl 167* 158* 111     Results from last 7 days   Lab Units 04/13/22  0514 04/12/22  1129   GLUCOSE RANDOM mg/dL 125 120     Results from last 7 days   Lab Units 04/12/22  1101 04/12/22  1034   PH, FLOR I-STAT   --  7 354   PCO2, FLOR ISTAT mm HG  --  54 5*   PO2, FLOR ISTAT mm HG  --  62 0*   HCO3, FLOR ISTAT mmol/L  --  30 4*   I STAT BASE EXC mmol/L 2 4*   I STAT O2 SAT % 100* 90*   ISTAT PH ART  7 387  --    I STAT ART PCO2 mm HG 46 0*  --    I STAT ART PO2 mm  0*  --    I STAT ART HCO3 mmol/L 27 7  --      Diet: CV diet with fld restriction 1800ml  Mobility: Up with assistance / POD #1 OOB to chair for all meals / Ambulate q8h  DVT Prophylaxis:  VTE Pharmacologic Prophylaxis: Heparin  VTE Mechanical Prophylaxis: sequential compression device     Medications/Pain Control:   Scheduled Medications:  amLODIPine, 5 mg, Oral, Daily  aspirin, 81 mg, Oral, Daily  atorvastatin, 40 mg, Oral, Daily  carvedilol, 12 5 mg, Oral, BID With Meals  cefazolin, 2,000 mg, Intravenous, Q8H  chlorhexidine, 15 mL, Mouth/Throat, BID  cholecalciferol, 5,000 Units, Oral, Daily  clopidogrel, 75 mg, Oral, Daily  docusate sodium, 100 mg, Oral, BID  finasteride, 5 mg, Oral, Daily  heparin (porcine), 5,000 Units, Subcutaneous, Q8H CONSUELO  losartan, 100 mg, Oral, Daily   And  hydrochlorothiazide, 25 mg, Oral, Daily  insulin lispro, 1-6 Units, Subcutaneous, TID AC  insulin lispro, 1-6 Units, Subcutaneous, HS  mirtazapine, 15 mg, Oral, Daily  mupirocin, 1 application, Nasal, U08I CONSUELO  pantoprazole, 40 mg, Oral, Early Morning  polyethylene glycol, 17 g, Oral, Daily      Continuous IV Infusions:  niCARdipine, 1-15 mg/hr, Intravenous, Titrated      PRN Meds:  acetaminophen, 650 mg, Rectal, Q4H PRN  acetaminophen, 650 mg, Oral, Q4H PRN  bisacodyl, 10 mg, Rectal, Daily PRN  ondansetron, 4 mg, Intravenous, Q6H PRN        Network Utilization Review Department  ATTENTION: Please call with any questions or concerns to [phone] and carefully listen to the prompts so that you are directed to the right person  All voicemails are confidential   Angela Gonzalez all requests for admission clinical reviews, approved or denied determinations and any other requests to dedicated fax number below belonging to the campus where the patient is receiving treatment   List of dedicated fax numbers for the Facilities:  [address] DENIALS (Administrative/Medical Necessity) [phone]   [address] (Maternity/NICU/Pediatrics) [phone]   [address] [place] [address] 1002 [facility] Jamila Rodriguez [phone]   501 [facility] [id number] [place] [address] Sofi Tolentino 1481 [address] [phone] 4601 [facility] [phone]

## 2022-04-13 NOTE — OCCUPATIONAL THERAPY NOTE
Occupational Therapy Evaluation     Patient Name: Lotus Trevino  JNSGC'Z Date: 4/13/2022  Problem List  Principal Problem:    Severe aortic stenosis  Active Problems:    CAD (coronary artery disease)    Hypertension    BPH with obstruction/lower urinary tract symptoms    Hyperlipidemia    S/P TAVR (transcatheter aortic valve replacement)    CHF (congestive heart failure) ([facility])    Hyperchloremia    Past Medical History  Past Medical History:   Diagnosis Date    Arthritis     AVB (atrioventricular block)     1st degree    BPH (benign prostatic hyperplasia)     CAD (coronary artery disease)     s/p CABGx2    CHF (congestive heart failure) ([facility])     Chronic rhinitis     Former tobacco use     Gait instability     History of CVA (cerebrovascular accident)     w/ residual left-sided weakness    Hyperlipidemia     Hypertension     Insomnia     Obesity     BMI 32 82    Pulmonary nodule     2cm    Severe aortic stenosis     Vitamin D deficiency      Past Surgical History  Past Surgical History:   Procedure Laterality Date    CORONARY ARTERY BYPASS GRAFT  2004    x2 LIMA-LAD, SVG-RCA    TONSILLECTOMY               04/13/22 0850   OT Last Visit   OT Visit Date 04/13/22   Note Type   Note type Evaluation   Restrictions/Precautions   Other Precautions Cardiac/sternal;Multiple lines;Telemetry  (a-line)   Pain Assessment   Pain Assessment Tool 0-10   Pain Score No Pain   Home Living   Type of Home House   Home Layout One level   Bathroom Shower/Tub Walk-in shower   Bathroom Toilet Standard   Bathroom Equipment Grab bars in shower; Shower chair   Bathroom Accessibility Accessible   Home Equipment Walker;Cane   Additional Comments Pt reports living in a 1 story home with 1 MOOK     Prior Function   Level of [place] Independent with ADLs and functional mobility   Lives With Alone  (son will be staying with him upon d/c)   Receives Help From Family   ADL Assistance Independent   IADLs Independent Vocational Retired   Lifestyle   Autonomy Pt reports being I with ADLS, IADLS and mobility without device PTA  (+)     Reciprocal Relationships Pt lives alone but reports that his son will be staying with him upon d/c    Service to Others Retired   [address] Enjoys fishing    Subjective   Subjective Pt reports that he has no concerns about returning home upon d/c  ADL   Where Assessed Chair   Eating Assistance 7  Independent   Grooming Assistance 5  Supervision/Setup   UB Bathing Assistance 5  Supervision/Setup   LB Bathing Assistance 4  Minimal Assistance   UB Dressing Assistance 5  Supervision/Setup   LB [place]  5  Supervision/Setup   Bed Mobility   Additional Comments Unable to assess, pt seated OOB in chair upon OT arrival  After OT session pt in chair with all needs within reach  Transfers   Sit to Stand 5  Supervision   Additional items Increased time required   Stand to Sit 5  Supervision   Additional items Increased time required   Functional Mobility   Functional Mobility 5  Supervision   Additional Comments Pt demonstrated household mobility with no rest breaks  Additional items Rolling walker   Balance   Static Sitting Fair +   Dynamic Sitting Fair   Static Standing Fair   Dynamic Standing Fair -   Ambulatory Fair -   Activity Tolerance   Activity Tolerance Patient limited by fatigue   Medical Staff Made Aware Seen with PT 2* medical complexity/ instability   Nurse Made Aware RN confirmed okay to see pt   RUE Assessment   RUE Assessment WFL   LUE Assessment   LUE Assessment WFL   Cognition   Overall Cognitive Status WFL   Arousal/Participation Alert; Cooperative   Attention Within functional limits   Orientation Level Oriented X4   Memory Within functional limits   Following Commands Follows one step commands without difficulty   Comments Pt is pleasant and cooperative to work with therapy   Pt participated in cardiac packet this session  Assessment   Assessment Pt is a 64 y o  male admitted to [facility] on 4/12/2022 w/ severe aortic stenosis s/p TAVR on 4/12/2022  Pt  has a past medical history of Arthritis, AVB (atrioventricular block), BPH (benign prostatic hyperplasia), CAD (coronary artery disease), CHF (congestive heart failure) ([address] ), Chronic rhinitis, Former tobacco use, Gait instability, History of CVA (cerebrovascular accident), Hyperlipidemia, Hypertension, Insomnia, Obesity, Pulmonary nodule, Severe aortic stenosis, and Vitamin D deficiency  Pt with active OT orders and ambulate  orders  Pt resides in a 1 story home with 1 MOOK  Pt lives alone but reports that his son will be coming to stay with him upon d/c  Pt was I w/  ADLS and IADLS, (+) drove, & required no use of DME PTA  Currently pt is supervision for functional transfers and functional mobility, supervision for UB ADLS and min A for LB ADLS  Based on the aforementioned OT evaluation, functional performance deficits, and assessments, pt has been identified as a moderate complexity evaluation  From OT standpoint, anticipate d/c home with family support  Recommend continued participation in [facility] and functional mobility with staff  No further acute OT needs, d/c OT  Goals   Patient Goals To return home    Recommendation   OT Discharge Recommendation No rehabilitation needs  (Home with increased support from family as needed)   OT - OK to Discharge Yes  (When medically appropriate)   AM-PAC Daily Activity Inpatient   Lower Body Dressing 3   Bathing 3   Toileting 4   Upper Body Dressing 4   Grooming 4   Eating 4   Daily Activity Raw Score 22   Daily Activity Standardized Score (Calc for Raw Score >=11) 47  [address] [facility]   Following a Speech/Presentation 4   Understanding Ordinary Conversation 4   Taking Medications 4   Remembering Where Things Are Placed or Put Away 4   Remembering List of 4-5 Errands 4   Taking Care of Complicated Tasks 4 Applied Cognition Raw Score 24   Applied Cognition Standardized Score 62 21   Modified Tadeo Scale   Modified [place] Scale 3        Dhara Roman, CHRISTOPH, OTR/L

## 2022-04-13 NOTE — CASE MANAGEMENT
Case Management Discharge Planning Note    Patient name Karly Lee  Location [address] 247-00 MRN [id number]  : 1945 Date 2022       Current Admission Date: 2022  Current Admission Diagnosis:Severe aortic stenosis   Patient Active Problem List    Diagnosis Date Noted    S/P TAVR (transcatheter aortic valve replacement) 2022    Former tobacco use 2022    CHF (congestive heart failure) ([address] ) 2022    Hyperchloremia 2022    AVB (atrioventricular block) 2022    Gait instability 08/10/2016    Allergic rhinitis 2016    History of stroke 2016    Severe aortic stenosis 2015    Hyperlipidemia 2015    Male erectile dysfunction 2015    BPH with obstruction/lower urinary tract symptoms 2014    Hypertension 2014    Insomnia 2014    Pulmonary nodule seen on imaging study 2014    CAD (coronary artery disease) 2013      LOS (days): 1  Geometric Mean LOS (GMLOS) (days): 1 70  Days to GMLOS:0 6     OBJECTIVE:  Risk of Unplanned Readmission Score: 11         Current admission status: Inpatient   Preferred Pharmacy:   [id number]- [place] - [address] [place] [address]  Phone: [phone] Fax: [address] [address]  Phone: [phone] Fax: [address], [address] [facility] [address] [facility]  Phone: [phone] Fax: [phone]    Primary Care Provider: Rajesh Ledezma DO    Primary Insurance: [address] REP  Secondary Insurance:     DISCHARGE DETAILS:    Discharge planning discussed with[de-identified] pt and son, PT  Freedom of Choice: Yes     CM contacted family/caregiver?: Yes  Were Treatment Team discharge recommendations reviewed with patient/caregiver?: Yes  Did patient/caregiver verbalize understanding of patient care needs?: Yes  Were patient/caregiver advised of the risks associated with not following Treatment Team discharge recommendations?: Yes         Requested [address] [place] [employer]         Is the patient interested in [facility] at discharge?: Yes  [address] requested[de-identified] Physical [address] Name[de-identified] [address] Provider[de-identified] PCP  Home Health Services Needed[de-identified] Evaluate Functional Status and Safety,Strengthening/Theraputic Exercises to Improve Function,Gait/ADL Training  Homebound Criteria Met[de-identified] Requires the Assistance of Another Person for Safe Ambulation or to Leave the Home,Uses an Assist Device (i e  cane, walker, etc)  Supporting Clincal Findings[de-identified] Fatigues [place] in Short Distances,Limited Endurance    DME Referral Provided  Referral made for DME?: No (has RW)    Other Referral/Resources/Interventions Provided:  Interventions: [facility]  Referral Comments: referral made to Jaz[facility] for home PT         Treatment Team Recommendation: Home with [address] [place] [employer]  Discharge Destination Plan[de-identified] Home with [place] at Discharge : Family                                   Family notified[de-identified] son here  Additional Comments: POD#1 TAVR discharging home today with son transporting  PT recommending home PT  Pt  has used [facility] before and would like again  Referral sent  ACCEPTED BY SUSAN RIVERA FAXED

## 2022-04-13 NOTE — RESTORATIVE TECHNICIAN NOTE
Restorative Technician Note      Patient Name: Jyoti Tejada     Restorative Tech Visit Date: 04/13/22  Note Type: Mobility  Patient Position Upon Consult: Bedside chair  Activity Performed: Ambulated  Assistive Device: Roller walker  Patient Position at End of Consult: Supine;  Other (comment) (for echo)

## 2022-04-13 NOTE — DISCHARGE INSTRUCTIONS
Transcatheter Aortic Valve Replacement   AMBULATORY CARE:   What you need to know about a TAVR:   · A TAVR is a procedure to replace your aortic valve  It is done without removing your old valve  The aortic valve is between the left ventricle and the aorta  The left ventricle is the lower left chamber of your heart  The aorta is a blood vessel that pumps blood to your brain and body  The aortic valve opens and closes to let blood flow from your heart to the rest of your body  · TAVR may be used to replace your aortic valve if open heart surgery is not safe for you  Your valve will be replaced with a tissue valve  The tissue may be taken from an animal, such as a pig or cow  What will happen before a TAVR:   · You may need an echocardiogram, angiogram, EKG, or CT scan before your procedure  These tests will help your healthcare provider plan your procedure  They will also make sure a TAVR is safe for you  You may need to stop taking blood thinner medicine several days before your procedure  This will prevent heavy bleeding during your procedure  · Your healthcare provider will talk to you about how to prepare for your procedure  He may tell you not to eat or drink anything after midnight on the day of your procedure  He will tell you what medicines to take or not take on the day of your procedure  You may stay in the hospital 2 to 5 days after your procedure  Arrange for someone to drive you home and stay with you  This person can call 911 if you have problems at home  What will happen during a TAVR:   · You may be given general anesthesia to keep you asleep and free from pain during your procedure  You may instead be given IV sedation and local anesthesia  IV sedation will make you feel calm and relaxed during the procedure  With local anesthesia, you may still feel pressure or pushing during your procedure, but you should not feel any pain   You may be given an antibiotic through your IV to prevent a bacterial infection  Tell healthcare providers if you have ever had an allergic reaction to an antibiotic  · Your healthcare provider will make an incision in your neck, groin, or chest  He will guide a catheter with a balloon on the end through a blood vessel and into your heart  He will inflate the balloon in the opening of your valve  This balloon make space for your new valve to fit inside the old one  The balloon will be deflated and the catheter will be removed  · Your healthcare provider will insert another catheter into your heart  This catheter will hold a balloon, a stent, and the new valve  The new valve will be inside of the stent  When the catheter reaches your valve, your healthcare provider will expand the balloon  The balloon will push your old valve against the walls of your heart  The stent and new valve will be put in the old valve's position  The balloon will be deflated  The stent will continue to hold your old valve out of the way  It will also keep your new valve in the correct place  · Your healthcare provider will remove the catheter and wire  He may use clamps, stitches, or other devices to close the incision  Pressure will be applied to the incision for several minutes to stop any bleeding  A pressure bandage or other pressure device may be placed over the incision to help prevent more bleeding  What will happen after a TAVR:   · Healthcare providers will monitor your vital signs and pulses in your arm or leg, closely  They will check your pressure bandage for bleeding or swelling  You will need to lie flat with your leg or arm straight for 2 to 4 hours  Do not  get out of bed until healthcare providers says it is okay  Arm or leg movements can cause serious bleeding  · You may need blood tests, a chest x-ray, an EKG, or an echocardiogram before you leave the hospital  These tests will make sure your valve is working correctly   You will need to take blood thinner medicine for at least 6 months after your procedure  You may need to take aspirin for the rest of your life  These medicines will prevent blood clots from forming near your valve  Risks of a TAVR:  You may have bruising or pain where the catheter was  You may get an infection or bleed more than expected  The catheter may cause a hole in your heart or blood vessels  A blood clot may form around your valve  The clot may travel to your brain and cause a stroke  Your heartbeat may become irregular during or after a TAVR  You may need medicines or procedures to treat this  Your new valve may not work correctly  You may need another procedure to replace the valve  Follow up with your doctor as directed:  Write down your questions so you remember to ask them during your visits  © Copyright [employer] 2022 Information is for End User's use only and may not be sold, redistributed or otherwise used for commercial purposes  All illustrations and images included in CareNotes® are the copyrighted property of A D A M , Inc  or [facility] Bobby Mccann   The above information is an  only  It is not intended as medical advice for individual conditions or treatments  Talk to your doctor, nurse or pharmacist before following any medical regimen to see if it is safe and effective for you

## 2022-04-13 NOTE — PHYSICAL THERAPY NOTE
Physical Therapy Evaluation     Patient's Name: Mrak Lynch    Admitting Diagnosis  Nonrheumatic aortic valve stenosis [I35 0]    Problem List  Patient Active Problem List   Diagnosis    Allergic rhinitis    Severe aortic stenosis    CAD (coronary artery disease)    Hypertension    BPH with obstruction/lower urinary tract symptoms    Gait instability    Hyperlipidemia    Insomnia    Male erectile dysfunction    Pulmonary nodule seen on imaging study    History of stroke    S/P TAVR (transcatheter aortic valve replacement)    Former tobacco use    CHF (congestive heart failure) (HCC)    Hyperchloremia    AVB (atrioventricular block)       Past Medical History  Past Medical History:   Diagnosis Date    Arthritis     AVB (atrioventricular block)     1st degree    BPH (benign prostatic hyperplasia)     CAD (coronary artery disease)     s/p CABGx2    CHF (congestive heart failure) (HCC)     Chronic rhinitis     Former tobacco use     Gait instability     History of CVA (cerebrovascular accident)     w/ residual left-sided weakness    Hyperlipidemia     Hypertension     Insomnia     Obesity     BMI 32 82    Pulmonary nodule     2cm    Severe aortic stenosis     Vitamin D deficiency        Past Surgical History  Past Surgical History:   Procedure Laterality Date    CORONARY ARTERY BYPASS GRAFT  2004    x2 LIMA-LAD, SVG-RCA    TONSILLECTOMY              04/13/22 0849   PT Last Visit   PT Visit Date 04/13/22   Note Type   Note type Evaluation   Pain Assessment   Pain Assessment Tool 0-10   Pain Score No Pain   Restrictions/Precautions   Other Precautions Cardiac/sternal;Multiple lines;Telemetry  (a-line)   Home Living   Type of [address] One level  (1 MOOK)   Home Equipment Walker;Cane   Prior Function   Level of [place] Independent with ADLs and functional mobility  (amb w/ SPC when outside; no AD inside)   Lives With Alone  (reports his son will stay w/ him for a few days )   Receives Help From [facility] includes the [facility]   Additional Pertinent History cleared for assessment (spoke to nsg)   Cognition   Overall Cognitive Status [facility]   Arousal/Participation Alert   Orientation Level Oriented to person;Oriented to place;Oriented to situation   Memory Within functional limits   Following Commands Follows one step commands without difficulty   Subjective   Subjective Alert; in the chair; agreeable to mobilize   RUE Assessment   RUE Assessment WFL  (AROM)   LUE Assessment   LUE Assessment WFL  (AROM)   RLE Assessment   RLE Assessment WFL  (AROM)   Strength RLE   RLE Overall Strength   (good -/ good (grossly))   LLE Assessment   LLE Assessment WFL  (AROM)   Strength LLE   LLE Overall Strength   (fair +/ good - (grossly))   Transfers   Sit to Stand 5  Supervision   Additional items Verbal cues   Stand to Sit 4  Minimal assistance   Additional items Verbal cues   Ambulation/Elevation   Gait pattern Excessively slow; Inconsistent nehemias; Short stride   Gait Assistance 4  Minimal assist   Additional items Assist x 1;Verbal cues; Tactile cues   Assistive Device Rolling walker   Distance 190 ft   Balance   Static Sitting Fair +   Dynamic Sitting Fair   Static Standing Fair   Dynamic Standing Fair -   Ambulatory Poor +   Activity Tolerance   Activity Tolerance Patient limited by fatigue   Medical Staff Made Aware Co-eval performed w/ OTR due to complexity of medical status and multiple comorbidities   Nurse Made Aware spoke to [facility], [facility]   Assessment   Prognosis Good   Problem List Decreased strength;Decreased endurance; Impaired balance;Decreased mobility   Assessment Pt is 64 y o  male admitted with Dx of Severe aortic stenosis and underwent Transcatheter aortic valve replacement with a 26 mm [address] CHUCK 3 ULTRA bioprosthetic valve via left transfemoral approach on 4/12/2022   Pt 's comorbidities affecting POC include: Arthritis, AVB (atrioventricular block), CAD (coronary artery disease), CHF (congestive heart failure) ([facility] ), Gait instability, History of CVA (cerebrovascular accident), Hypertension, and Obesity and personal factors of: living alone and MOOK  Pt's clinical presentation is currently unstable/unpredictable which is evident in ongoing telem monitoring w/ a-line in place, abn lab values and  need for min assist w/ amb w/ use of rw when usually mobilizing independently w/ use of SPC outside only  Pt presents w/ min overall weakness, decreased endurance and inconsistent amb balance and gait patterns requiring use of rw at this time  Will cont to follow pt in PT for progressive mobilization to max level of (I), endurance, and safety  Otherwise, anticipate pt will return home w/ available family support upon D/C provided he cont improving w/ mobility skills, safety, and endurance and when medically cleared; home PT follow up is recommended; will follow  Goals   Patient Goals to get stronger   STG Expiration Date 04/20/22   Short Term Goal #1 5-7 days  Pt will amb 300 ft w/ least restrictive assistive device, mod (I) in order to facilitate safe return to premorbid environment and community amb status  Pt will negotiate 1 step w/ appropriate AD, mod (I) in order to navigate in and out of home environment safely  Pt will achieve (I) level w/ bed mob in order to facilitate safety with OOB and back to bed transitions in own living environment  Pt will perform transfers w/ mod (I) to assure (I) and safety w/ functional mobility/transitions w/ all aspects of mobility/locomotion  Pt will participate in LE therex and balance activities to max progression w/ mobility skills  PT Treatment Day 0   Plan   Treatment/Interventions Functional transfer training;LE strengthening/ROM; Elevations; Therapeutic exercise; Endurance training;Equipment eval/education; Bed mobility;Gait training;Spoke to nursing;Spoke to case management;OT   PT Frequency 4-6x/wk   Recommendation   PT Discharge Recommendation Home with home health rehabilitation  (home PT)   Equipment Recommended Walker  (cont using it at this time; pt informed)   Jerald 8 in Bed Without Bedrails 4   Lying on Back to Sitting on Edge of Flat Bed 3   Moving Bed to Chair 3   Standing Up From Chair 3   Walk in Room 3   Climb 3-5 Stairs 3   Basic Mobility Inpatient Raw Score 19   Basic Mobility Standardized Score 42 48   Highest Level Of Mobility   JH-HL Goal 6: Walk 10 steps or more   JH-HLM Highest Level of Mobility 7: Walk 25 feet or more   JH-HLM Goal Achieved Yes   Modified Tadeo Scale   Modified [place] Scale 4   End of Consult   Patient Position at End of Consult Bedside chair; All needs within reach       [facility], PT

## 2022-04-13 NOTE — CONSULTS
Consultation - Cardiology Team One  Lavelle Bradley 64 y o  male MRN: [id number]  Unit/Bed#: [facility] 595-42 Encounter: [id number]    Inpatient consult to Cardiology  Consult performed by: MEGA Dempsey  Consult ordered by: Debi Grewal PA-C      Physician Requesting Consult: Rainer Guillory DO  Reason for Consult / Principal Problem: s/p TAVR    Assessment/ Plan    1  Severe AS s/p TAVR 4/12/22  POD#1; hemodynamically stable with mildly elevated BP recently weaned off of Cardene gtt  On room air and ambulating in [place] without symptoms  Postop TTE pending from today  Likely discharge home today on ASA, Plavix, statin, and beta-blocker  Outpatient follow-up arranged for 04/28/2022    2  CAD s/p CABG 2004- preoperative Lexiscan showed fixed perfusion defect of basal inferior wall but no evidence of ischemia  He denies anginal complaints  On ASA, statin, and beta-blocker    3  HTN- slightly elevated but weaned off cardene gtt this morning  On amlodipine 5 mg daily, carvedilol 12 5 mg b i d  (increased from home dose of 6 25 mg b i d ), and losartan-HCTZ 100-25 mg daily  4  HLD- LDL 69- on atorvastatin 40 mg daily    5  Hx of CVA- on ASA and statin    6  Hypokalemia- K 3 3, replete     History of Present Illness   HPI: Lavelle Bradley is a 64y o  year old male with severe aortic stenosis, CAD s/p remote CABG, HTN, HLD, and CVA  He follows with cardiologist Dr Geena Ashley and was last seen in the office in February 2022 to discuss TAVR options after echocardiogram showed LVEF 55%, grade 1 DD, and severe AS with MICHELLE 0 7 cm2 with mean gradient 44 mmHg  Pre operative nuclear stress test showed small fixed defect of basal inferior wall but no evidence of ischemia  He presented for elective transfemoral TAVR with a 26 mm Plaza sayda 3 Ultra bioprosthetic valve via left transfemoral approach   Post op TTE pending from earlier today but intra op LORIE showed LVEF 65% with normally functioning TAVR without PVL  He remained on cardene at 5 mg/hr overnight but this has been turned off at time of my exam with SBP in the 140s  He has also been able to be weaned to room air this morning and ambulated in the calvert without difficulty  ECG demonstrated NSR with 1st degree AV block  Cardiology consulted as part of routine post operative management  EKG reviewed personally: NSR with 1st degree AV block    Telemetry reviewed personally:  NSR with first-degree AV block    Review of Systems   Constitutional: Negative for chills, malaise/fatigue and weight gain  Cardiovascular: Positive for leg swelling  Negative for chest pain, dyspnea on exertion, orthopnea, palpitations and syncope  Respiratory: Negative for cough, shortness of breath, sleep disturbances due to breathing and sputum production  Gastrointestinal: Negative for bloating and nausea  Neurological: Negative for dizziness, light-headedness and weakness  Psychiatric/Behavioral: Negative for altered mental status  All other systems reviewed and are negative      Historical Information   Past Medical History:   Diagnosis Date    Arthritis     AVB (atrioventricular block)     1st degree    BPH (benign prostatic hyperplasia)     CAD (coronary artery disease)     s/p CABGx2    CHF (congestive heart failure) (HCC)     Chronic rhinitis     Former tobacco use     Gait instability     History of CVA (cerebrovascular accident)     w/ residual left-sided weakness    Hyperlipidemia     Hypertension     Insomnia     Obesity     BMI 32 82    Pulmonary nodule     2cm    Severe aortic stenosis     Vitamin D deficiency      Past Surgical History:   Procedure Laterality Date    CORONARY ARTERY BYPASS GRAFT  2004    x2 LIMA-LAD, SVG-RCA    TONSILLECTOMY       Social History     Substance and Sexual Activity   Alcohol Use No     Social History     Substance and Sexual Activity   Drug Use No     Social History     Tobacco Use   Smoking Status Former Smoker    Packs/day: 1 50    Years: 4 00    Pack years: 6 00    Types: Cigarettes    Quit date: 56    Years since quittin 3   Smokeless Tobacco Never Used   Tobacco Comment    former light tobacco smoker     Family History:   Family History   Problem Relation Age of Onset    Coronary artery disease Mother     Arthritis Mother     Hypertension Mother     Stroke Mother     Other Father         aortic valve replacement; CABG    Arthritis Father     Hypertension Brother     Stroke Brother        Meds/Allergies   all current active meds have been reviewed and current meds:   Current Facility-Administered Medications   Medication Dose Route Frequency    acetaminophen (TYLENOL) rectal suppository 650 mg  650 mg Rectal Q4H PRN    acetaminophen (TYLENOL) tablet 650 mg  650 mg Oral Q4H PRN    amLODIPine (NORVASC) tablet 5 mg  5 mg Oral Daily    aspirin (ECOTRIN LOW STRENGTH) EC tablet 81 mg  81 mg Oral Daily    atorvastatin (LIPITOR) tablet 40 mg  40 mg Oral Daily    bisacodyl (DULCOLAX) rectal suppository 10 mg  10 mg Rectal Daily PRN    carvedilol (COREG) tablet 12 5 mg  12 5 mg Oral BID With Meals    ceFAZolin (ANCEF) IVPB (premix in dextrose) 2,000 mg 50 mL  2,000 mg Intravenous Q8H    chlorhexidine (PERIDEX) 0 12 % oral rinse 15 mL  15 mL Mouth/Throat BID    cholecalciferol (VITAMIN D3) tablet 5,000 Units  5,000 Units Oral Daily    clopidogrel (PLAVIX) tablet 75 mg  75 mg Oral Daily    docusate sodium (COLACE) capsule 100 mg  100 mg Oral BID    finasteride (PROSCAR) tablet 5 mg  5 mg Oral Daily    heparin (porcine) subcutaneous injection 5,000 Units  5,000 Units Subcutaneous Q8H [address]    losartan (COZAAR) tablet 100 mg  100 mg Oral Daily    And    hydrochlorothiazide (HYDRODIURIL) tablet 25 mg  25 mg Oral Daily    insulin lispro (HumaLOG) 100 units/mL subcutaneous injection 1-6 Units  1-6 Units Subcutaneous TID AC    insulin lispro (HumaLOG) 100 units/mL subcutaneous injection 1-6 Units  1-6 Units Subcutaneous HS    mirtazapine (REMERON) tablet 15 mg  15 mg Oral Daily    mupirocin (BACTROBAN) 2 % nasal ointment 1 application  1 application Nasal C33Z [facility]    niCARdipine (CARDENE) 25 mg (STANDARD CONCENTRATION) in sodium chloride 0 9% 250 mL  1-15 mg/hr Intravenous Titrated    ondansetron (ZOFRAN) injection 4 mg  4 mg Intravenous Q6H PRN    pantoprazole (PROTONIX) EC tablet 40 mg  40 mg Oral Early Morning    polyethylene glycol (MIRALAX) packet 17 g  17 g Oral Daily     niCARdipine, 1-15 mg/hr, Last Rate: 5 mg/hr (04/13/22 0620)    No Known Allergies    Objective   Vitals: Blood pressure 134/58, pulse 69, temperature 97 7 °F (36 5 °C), temperature source Oral, resp  rate 19, height 5' 10" (1 778 m), weight 106 kg (234 lb), SpO2 94 %  , Body mass index is 33 58 kg/m²  ,     Systolic (09AUN), MLF:354 , Min:108 , HJA:939     Diastolic (69WSI), MAE:39, Min:56, Max:83    Intake/Output Summary (Last 24 hours) at 4/13/2022 0948  Last data filed at 4/13/2022 0809  Gross per 24 hour   Intake 1500 ml   Output 1425 ml   Net 75 ml     Wt Readings from Last 3 Encounters:   04/13/22 106 kg (234 lb)   03/28/22 108 kg (238 lb 12 8 oz)   02/23/22 107 kg (236 lb)     Invasive Devices  Report    Central Venous Catheter Line            CVC Central Lines 04/12/22 Triple <1 day          Peripheral Intravenous Line            Peripheral IV 04/12/22 Left Wrist <1 day          Arterial Line            Arterial Line 04/12/22 Left Radial <1 day              Physical Exam  Vitals reviewed  Constitutional:       General: He is not in acute distress  Appearance: He is obese  Neck:      Vascular: No hepatojugular reflux or JVD  Cardiovascular:      Rate and Rhythm: Normal rate and regular rhythm  Pulses: Normal pulses  Heart sounds: Normal heart sounds  No murmur heard  No friction rub  No gallop  Pulmonary:      Effort: Pulmonary effort is normal  No respiratory distress        Breath sounds: Normal breath sounds  No rales  Comments: Clear, room air  Abdominal:      General: Bowel sounds are normal  There is no distension  Palpations: Abdomen is soft  Tenderness: There is no abdominal tenderness  Musculoskeletal:         General: No tenderness  Normal range of motion  Cervical back: Neck supple  Right lower le+ Pitting Edema present  Left lower le+ Pitting Edema present  Skin:     General: Skin is warm and dry  Capillary Refill: Capillary refill takes less than 2 seconds  Findings: No erythema  Comments: Left groin site dressing clean dry and intact  No significant ecchymosis, erythema, or swelling  Neurological:      Mental Status: He is alert and oriented to person, place, and time     Psychiatric:         Mood and Affect: Mood normal      LABORATORY RESULTS:      CBC with diff:   Results from last 7 days   Lab Units 22  0522  1129 22  1101 22  1034   WBC Thousand/uL 10 17*  --   --   --    HEMOGLOBIN g/dL 13 3 13 4  --   --    I STAT HEMOGLOBIN g/dl  --   --  11 9* 13 3   HEMATOCRIT % 38 1 38 2  --   --    HEMATOCRIT, ISTAT %  --   --  35* 39   MCV fL 93  --   --   --    PLATELETS Thousands/uL 160 175  --   --    MCH pg 32 5  --   --   --    MCHC g/dL 34 9  --   --   --    RDW % 12 8  --   --   --    MPV fL 9 6 9 5  --   --      CMP:  Results from last 7 days   Lab Units 22  0522  1129 22  1101 22  1034   POTASSIUM mmol/L 3 3* 3 7  --   --    CHLORIDE mmol/L 103 109*  --   --    CO2 mmol/L 29 28  --   --    CO2, I-STAT mmol/L  --   --  29 32   BUN mg/dL 20 26*  --   --    CREATININE mg/dL 0 97 1 02  --   --    GLUCOSE, ISTAT mg/dl  --   --  122 116   CALCIUM mg/dL 8 6 8 7  --   --    EGFR ml/min/1 73sq m 74 70  --   --      BMP:  Results from last 7 days   Lab Units 22  0514 22  1129 22  1101 22  1034 22  1034   POTASSIUM mmol/L 3 3* 3 7  --   --   -- CHLORIDE mmol/L 103 109*  --   --   --    CO2 mmol/L 29 28  --   --   --    CO2, I-STAT mmol/L  --   --  29  --  32   BUN mg/dL 20 26*  --   --   --    CREATININE mg/dL 0 97 1 02  --   --   --    GLUCOSE, ISTAT mg/dl  --   --  122   < > 116   CALCIUM mg/dL 8 6 8 7  --   --   --     < > = values in this interval not displayed  Lab Results   Component Value Date    CREATININE 0 97 2022    CREATININE 1 02 2022    CREATININE 1 08 2022      Results from last 7 days   Lab Units 22  0514   MAGNESIUM mg/dL 2 0     Lipid Profile:   Lab Results   Component Value Date    CHOL 118 2017    CHOL 113 2016     Lab Results   Component Value Date    HDL 34 (L) 2021    HDL 38 (L) 2021    HDL 34 (L) 2020     Lab Results   Component Value Date    LDLCALC 69 2021    LDLCALC 61 2021    LDLCALC 50 2020     Lab Results   Component Value Date    TRIG 145 2021    TRIG 127 2021    TRIG 182 (H) 2020     Cardiac testing:   Results for orders placed during the hospital encounter of 20    Echo complete with contrast if indicated    Narrative  [place] [address], [address] [facility]  [phone]    Transthoracic Echocardiogram  2D, M-mode, Doppler, and Color Doppler    Study date:  29-Sep-2020    Patient: Magda Tello  MR number: [id number]  Account number: [de-identified]  : 1945  Age: 66 years  Gender: Male  Status: Outpatient  Location: [facility]  Height: 72 in  Weight: 234 5 lb  BP: 126/ 72 mmHg    Indications: Aortic stenosis      Diagnoses: I35 0 - Nonrheumatic aortic (valve) stenosis    Sonographer:  LAZ Alvarado  Primary Physician:  Fletcher Arndt DO  Referring Physician:  Fletcher Arndt DO  Group[facility]  [facility]  Interpreting Physician:  Mirella Sanchez MD    SUMMARY    LEFT VENTRICLE:  Systolic function was normal  LVEF >55%  This study was inadequate for the evaluation of regional wall motion  Wall thickness was at the upper limits of normal   There was mild assymetrical hypertrophy of the septum  Doppler parameters were consistent with abnormal left ventricular relaxation (grade 1 diastolic dysfunction)  RIGHT VENTRICLE:  The size was at the upper limits of normal   Systolic function was mildly reduced  MITRAL VALVE:  There was mild annular calcification  There was trace regurgitation  AORTIC VALVE:  The valve was trileaflet  Leaflets exhibited moderate calcification and mildly reduced cuspal separation  Transaortic velocity was increased due to valvular stenosis  There was moderate stenosis  Peak velocity 2 8m/s, mean pressure gradient 20mmHG  MICHELLE - 1 2cm2 using continuity equation(unchanged from prior echo in 2019)    TRICUSPID VALVE:  There was mild regurgitation  HISTORY: PRIOR HISTORY: Aortic stenosis, Coronary artery disease, hyperlipidemia, hypertension, H/o stroke  PROCEDURE: The study was performed in the [address] [place] [address]  This was a routine study  The transthoracic approach was used  The study included complete 2D imaging, M-mode, complete spectral Doppler, and color Doppler  The  heart rate was 53 bpm, at the start of the study  Images were obtained from the parasternal, apical, subcostal, and suprasternal notch acoustic windows  Echocardiographic views were limited due to decreased penetration and lung  interference  This was a technically difficult study  LEFT VENTRICLE: Size was normal  Systolic function was normal  LVEF >55% This study was inadequate for the evaluation of regional wall motion  Wall thickness was at the upper limits of normal  There was mild assymetrical hypertrophy of the  septum  DOPPLER: Doppler parameters were consistent with abnormal left ventricular relaxation (grade 1 diastolic dysfunction)      RIGHT VENTRICLE: The size was at the upper limits of normal  Systolic function was mildly reduced  LEFT ATRIUM: Size was normal     RIGHT ATRIUM: Size was normal     MITRAL VALVE: There was mild annular calcification  DOPPLER: There was no evidence for stenosis  There was trace regurgitation  AORTIC VALVE: The valve was trileaflet  Leaflets exhibited moderate calcification and mildly reduced cuspal separation  DOPPLER: Transaortic velocity was increased due to valvular stenosis  There was moderate stenosis  Peak velocity 2 8m/s, mean pressure gradient 20mmHG  MICHELLE - 1 2cm2 using continuity equation(unchanged from prior echo in 2019) There was no regurgitation  TRICUSPID VALVE: DOPPLER: There was mild regurgitation  PULMONIC VALVE: Not well visualized  PERICARDIUM: There was no pericardial effusion  AORTA: The root exhibited normal size  SYSTEMIC VEINS: IVC: The inferior vena cava was not well visualized  SYSTEM MEASUREMENT TABLES    2D  %FS: 28 41 %  Ao Diam: 3 36 cm  EDV(Teich): 103 99 ml  EF(Teich): 54 79 %  ESV(Teich): 47 02 ml  IVSd: 0 93 cm  LA Area: 17 94 cm2  LA Diam: 4 73 cm  LVEDV MOD A4C: 68 79 ml  LVEF MOD A4C: 61 29 %  LVESV MOD A4C: 26 63 ml  LVIDd: 4 73 cm  LVIDs: 3 39 cm  LVLd A4C: 7 51 cm  LVLs A4C: 6 57 cm  LVOT Diam: 2 1 cm  LVPWd: 0 96 cm  RA Area: 15 76 cm2  RVIDd: 3 91 cm  SV MOD A4C: 42 16 ml  SV(Teich): 56 97 ml    CW  AV Env  Ti: 333 02 ms  AV MaxP 93 mmHg  AV VTI: 66 6 cm  AV Vmax: 2 63 m/s  AV Vmean: 2 m/s  AV meanP 72 mmHg    MM  TAPSE: 1 85 cm    PW  E': 0 47 m/s  E/E': 1 03  MV A Ryder: 0 58 m/s  MV Dec Comanche: 1 49 m/s2  MV DecT: 324 57 ms  MV E Ryder: 0 48 m/s  MV E/A Ratio: 0 83  MV PHT: 94 12 ms  MVA By PHT: 2 34 cm2    [employer] Accredited Echocardiography Laboratory    Prepared and electronically signed by    Lisa Persaud MD  Signed 29-Sep-2020 13:35:30    Imaging: I have personally reviewed pertinent reports     and I have personally reviewed pertinent films in PACS  Cardiac catheterization    Result Date: 2022  Narrative: OPERATIVE REPORT PATIENT NAME: Lotus Trevino  :  1945 MRN: [id number] Pt Location: [facility] HYBRID OR ROOM 02 SURGERY DATE: 2022 Surgeon(s) and Role: Panel 1:    * Anna Pepper DO - Primary    * Dyanne Fothergill, PA-C - Assisting Panel 2:    * Cordell Jurado DO - Primary Co-Surgeons: Johny Hyman DO; Anna Pepper DO PREOPERATIVE DIAGNOSIS Symptomatic severe aortic stenosis  POSTOPERATIVE DIAGNOSIS Symptomatic severe aortic stenosis  PROCEDURE Transcatheter aortic valve replacement with a 26 mm Plaza CHUCK 3 ULTRA bioprosthetic valve via a percutaneous left transfemoral approach  ANESTHESIA Romina Matthews, general endotracheal anesthesia with transesophageal echocardiogram guidance  After informed consent was obtained, patient brought to hybrid operating room in fasting state  Time out was performed  Using modified seldinger technique sheaths were placed in the left   femoral artery and vein respectively  The left   femoral artery was also used for placement of delivery sheath  The vessel was accessed using modified seldinger technique  Using fluoroscopy a temporary wire was advanced into RV apex through transfemoral sheath  The coplanar view was obtained using pigtail catheter placed in ascending aorta with subsequent ascending aortography  The TAVR delivery sheath was ulltimately advanced over a stiff wire  Next the 26 mm Plaza CHUCK 3 ULTRA valve was implanted using rapid pacing with fluoro and LORIE guidance  There was no significant paravalvular leak appreciated post implant  The sheaths were removed and hemostasis obtained by manual compression of the venous sheath  The TAVR delivery sheath was removed and percutaneous closure was obtained using Preclose technique with two Proglide devices deployed pre sheath insertion  Patient left the hybrid operating room in stable condition  Impression    Successful 26 mm Plaza CHUCK 3 ULTRA transcatheter aortic valve replacement  SIGNATURE: Silvia Braun DO DATE: April 12, 2022 TIME: 11:21 AM NOTE: A cardiac surgeon as co-surgeon was required as is a CMS requirement as well as needed for conventional open (non catheter based) surgical skills should become necessary  LORIE Anesthesia    Result Date: 4/12/2022  Narrative: Marlene Ayala MD     4/12/2022 12:48 PM Procedure Performed: LORIE Anesthesia Start Time:  4/12/2022 10:37 AM Preanesthesia Checklist Patient identified, IV assessed, risks and benefits discussed, monitors and equipment assessed, procedure being performed at surgeon's request and anesthesia consent obtained  Procedure Diagnostic Indications for LORIE:  assessment of surgical repair and hemodynamic monitoring  Type of LORIE: interventional LORIE with real time guidance of intracardiac procedure  Images Saved: ultrasound permanent image saved  Physician Requesting Echo: Gregory Aguila DO  Location performed: OR  Intubated  Bite block not placed  Heart visualized  Insertion of LORIE Probe:  Atraumatic  Probe Type:  Multiplane  Modalities:  3D, color flow mapping, continuous wave Doppler and pulse wave Doppler  Echocardiographic and Doppler Measurements PREPROCEDURE LEFT VENTRICLE: Systolic Function: normal  Ejection Fraction: 65%  Cavity size: normal  RIGHT VENTRICLE: Systolic Function: normal   Cavity size normal  No hypertrophy  AORTIC VALVE: Leaflets: trileaflet  Leaflet motions restricted  Stenosis: severe  Regurgitation: none  MITRAL VALVE: Leaflets: normal  Leaflet Motions: normal  Regurgitation: mild  Stenosis: none  TRICUSPID VALVE: Leaflets: normal  Leaflet Motions: normal  Stenosis: none  Regurgitation: mild  PULMONIC VALVE: Leaflets: normal  Regurgitation: none  Stenosis: none  ASCENDING AORTA: Size:  normal  Dissection not present  AORTIC ARCH: Size:  normal  dissection not present  Grade 2: severe intimal thickening without protruding atheroma   DESCENDING AORTA: Size: normal  Dissection not present  Grade 2: severe intimal thickening without protruding atheroma  RIGHT ATRIUM: Size:  normal  No spontaneous echo contrast  LEFT ATRIUM: Size: normal  No spontaneous echo contrast  LEFT ATRIAL APPENDAGE: Size: normal  No spontaneous echo contrast ATRIAL SEPTUM: Intra-atrial septal morphology: normal  VENTRICULAR SEPTUM: Intra-ventricular septum morphology: normal  EPIAORTIC: Plaque Thickness: 0-5 mm  OTHER FINDINGS: Pericardium:  normal  Pleural Effusion:  none  POSTPROCEDURE LEFT VENTRICLE: Unchanged   RIGHT VENTRICLE: Unchanged   AORTIC VALVE: Leaflets: bioprosthetic  Stenosis: none  Regurgitation: none  Valve Size: 26 mm  MITRAL VALVE: Unchanged   TRICUSPID VALVE: Unchanged   PULMONIC VALVE: Unchanged ATRIA: Unchanged   AORTA: Unchanged   REMOVAL: Probe Removal: atraumatic  XR chest portable ICU    Result Date: 4/12/2022  Narrative: CHEST INDICATION:   Post TAVR  COMPARISON:  Chest radiograph from 10/18/2010 and chest CT from 3/10/2022  EXAM PERFORMED/VIEWS:  AP PORTABLE  FINDINGS: Right jugular catheter in SVC  Cardiomediastinal silhouette appears unremarkable  Post TAVR  Lungs clear  No effusion or pneumothorax  Redemonstration of benign left lower lobe nodule  Osseous structures within normal limits for age  Impression: No acute cardiopulmonary disease  Post TAVR  Workstation performed: [id number]     LORIE intraop interventional w/realtime guidance of cardiac procedures    Result Date: 4/12/2022  Narrative: This order contains the linked images for the LORIE that was performed by the Anesthesiologist   Please see the  CARDIAC LORIE ANESTHESIA procedure for results  Thank you for allowing us to participate in this patient's care  This pt will follow up with Dr Yumiko Arceo once discharged  Counseling / Coordination of Care  Total floor / unit time spent today 45 minutes    Greater than 50% of total time was spent with the patient and / or family counseling and / or coordination of care  A description of the counseling / coordination of care: Review of history, current assessment, development of a plan  Code Status: Level 1 - Full Code    ** Please Note: Dragon 360 Dictation voice to text software may have been used in the creation of this document   **

## 2022-04-13 NOTE — CONSULTS
Consultation - Chris Jacob 64 y o  male MRN: [id number]  Unit/Bed#: [facility] 412-01 Encounter: [id number]      Assessment/Plan     Severe symptomatic aortic stenosis  -s/p TAVR yesterday  -Tylenol available for pain  -postop chest x-ray completed, postop echo pending  -cardiology consult pending    Ambulatory dysfunction   -requires use of cane for ambulation at baseline  -hx one fall in past year  -high risk future falls due to age, hx fall, impaired vision, deconditioning/debility and unfamiliar environment   -encourage good body mechanics and assist with all transfers  -keep personal items and call bell close to prevent reaching  -maintain environment free of fall hazards  -encourage appropriate footwear and adequate lighting at all times when out of bed  -recommend home fall risk assessment and personal fall alert system on returning home  -PT and OT pending    Cognitive screening  -alert and fully oriented, denies memory concerns  -reports full independence with ADLs and IADLs  -no previous cognitive testing on record for review  -no previous CTH or MRI brain for review  -no recent TSH or B12, consider checking as outpatient  -encourage use of sensory assist devices such as corrective lenses and hearing assist device at all appropriate times to reduce risk sensory impairment contributing to isolation, confusion, encephalopathy and more precipitous cognitive decline  -consider comprehensive geriatric assessment as outpatient following recovery from surgery and acute medical conditions    Dysuria  -reported since Baires catheter removal  -if does not improve consider UA    BPH with LUTS  -continue home finasteride  -increases risk of fecal and urinary retention - recommend retention protocol and aggressive bowel regimen    Insomnia  -maintained on mirtazapine   -encourage good sleep hygiene and consistent sleep/wake time  -minimize stimulating activities/agents in late afternoon/early evening to reduce disruption normal circadian rhythm  -could consider addition of low-dose melatonin at bedtime as needed    Impaired Vision  -recommend use of corrective lenses at all appropriate times  -encourage adequate lighting and encourage use of assistance with ambulation  -keep personal belongings close to person to avoid reaching  -encourage appropriate footwear at all times  -recommend large font for printed materials provided to patient    Impaired Hearing  -mildly hard of hearing but does not use hearing aids - consider outpatient audiology evaluation  -encourage providers and caregivers to speak slowly and clearly directly to patient  -minimize background noise to encourage patient engagement  -consider use of hearing amplifier  -recommend use of teach back method to ensure clear communication    Impaired mastication  -Requires use of dentures - encourage use of appropriate times  -ensure meal consistency appropriate for abilities  -continue aspiration precautions    Deconditioning/debility/frailty  -clinical frailty scale stage 5, mildly frail  -multifactorial including age, CAD and multiple additional chronic medical comorbidities  -albumin 3 9, encourage well-balanced nutrition and adherence to fluid restriction as outlined by CTS  -continue to optimize chronic conditions and address acute metabolic derangements as arise    Delirium precautions  -Patient is high risk of delirium due to age, surgical procedure, anesthesia, hospitalization  -Initiate delirium precautions  -maintain normal sleep/wake cycle  -minimize overnight interruptions, group overnight vitals/labs/nursing checks as possible  -dim lights, close blinds and turn off tv to minimize stimulation and encourage sleep environment in evenings  -ensure that pain is well controlled   -monitor for fecal and urinary retention which may precipitate delirium  -encourage early mobilization and ambulation with assistance as appropriate and once cleared by primary service  -provide frequent reorientation and redirection-as indicated and appropriate  -minimize use of medications which may precipitate or worsen delirium such as tramadol, benzodiazepine, anticholinergics, and benadryl  -encourage hydration and nutrition   -redirect unwanted behaviors as first line    Home medication review    [facility] mail order pharmacy:    Amlodipine 5 mg daily  Atorvastatin 40 mg daily  Coreg 6 25 mg BID  Plavix 75 mg daily  Finasteride 5 mg daily   Mirtazapine 15 mg HS  Olmesartan-HCTZ 40-25 mg daily    History of Present Illness   Physician Requesting Consult: Kieran Ybarra DO  Reason for Consult / Principal Problem:  Routine post TAVR geriatric eval  Hx and PE limited by: N/A  Additional history obtained from: Chart review and patient evaluation    HPI: Sylvie Morales is a 64y o  year old male with hypertension, hyperlipidemia, coronary disease, Congestive Heart Failure, BPH with LUTS, and insomnia who is admitted to the cardiothoracic service with severe symptomatic aortic stenosis admitted for TAVR, he is being seen in consultation by Geriatrics for routine post TAVR geriatric evaluation  He seen examined bedside where he sitting resting comfortably, he states that he did not sleep well overnight due to being uncomfortable but otherwise denies acute complaints  Prior to admission he was residing home alone independently in the community, his son and his family live in [place]  He reports use of cane for ambulation at baseline and one fall in the past year which he states was mechanical in nature and occurred in the parking deck at the hospital when he was here for an outpatient visit with one of his providers, he denies sustaining any traumatic injuries at that time  He requires use of glasses for reading and dentures, does not use hearing aids although notes his hearing is mildly impaired  He denies any memory or cognitive concerns      Inpatient consult to Gerontology  Consult performed by: Ame Cesar DO  Consult ordered by: Arthur Parker PA-C        Review of Systems   Constitutional: Negative  Negative for chills and fever  HENT: Positive for dental problem ( wears dentures)  Eyes: Positive for visual disturbance (Wears glasses)  Respiratory: Negative  Cardiovascular: Negative  Gastrointestinal: Negative  Genitourinary: Positive for dysuria  Musculoskeletal: Positive for gait problem ( uses cane)  Skin: Negative  Neurological: Negative for dizziness, weakness, light-headedness and headaches  Hematological: Negative  Psychiatric/Behavioral: Positive for sleep disturbance  All other systems reviewed and are negative      Historical Information   Past Medical History:   Diagnosis Date    Arthritis     AVB (atrioventricular block)     1st degree    BPH (benign prostatic hyperplasia)     CAD (coronary artery disease)     s/p CABGx2    CHF (congestive heart failure) (HCC)     Chronic rhinitis     Former tobacco use     Gait instability     History of CVA (cerebrovascular accident)     w/ residual left-sided weakness    Hyperlipidemia     Hypertension     Insomnia     Obesity     BMI 32 82    Pulmonary nodule     2cm    Severe aortic stenosis     Vitamin D deficiency      Past Surgical History:   Procedure Laterality Date    CORONARY ARTERY BYPASS GRAFT  2004    x2 LIMA-LAD, SVG-RCA    TONSILLECTOMY       Social History   Social History     Substance and Sexual Activity   Alcohol Use No     Social History     Substance and Sexual Activity   Drug Use No     Social History     Tobacco Use   Smoking Status Former Smoker    Packs/day: 1 50    Years: 4 00    Pack years: 6 00    Types: Cigarettes    Quit date: Johnathan Richter Years since quittin 3   Smokeless Tobacco Never Used   Tobacco Comment    former light tobacco smoker     Family History:   Family History   Problem Relation Age of Onset    Coronary artery disease Mother     Arthritis Mother     Hypertension Mother     Stroke Mother     Other Father         aortic valve replacement; CABG    Arthritis Father     Hypertension Brother     Stroke Brother      Meds/Allergies   all current active meds have been reviewed    No Known Allergies    Objective     Intake/Output Summary (Last 24 hours) at 4/13/2022 0610  Last data filed at 4/13/2022 0043  Gross per 24 hour   Intake 1320 ml   Output 1250 ml   Net 70 ml     Invasive Devices  Report    Central Venous Catheter Line            CVC Central Lines 04/12/22 Triple <1 day          Peripheral Intravenous Line            Peripheral IV 04/12/22 Left Wrist <1 day          Arterial Line            Arterial Line 04/12/22 Left Radial <1 day              Physical Exam  Vitals and nursing note reviewed  Constitutional:       General: He is not in acute distress  Appearance: Normal appearance  He is not ill-appearing  HENT:      Head: Normocephalic and atraumatic  Nose: Nose normal       Mouth/Throat:      Mouth: Mucous membranes are dry  Eyes:      General: No scleral icterus  Right eye: No discharge  Left eye: No discharge  Conjunctiva/sclera: Conjunctivae normal    Neck:      Comments: Phonation normal  Cardiovascular:      Rate and Rhythm: Normal rate and regular rhythm  Heart sounds: Murmur heard  Pulmonary:      Effort: Pulmonary effort is normal  No respiratory distress  Breath sounds: No wheezing  Abdominal:      General: Bowel sounds are normal  There is no distension  Palpations: Abdomen is soft  Tenderness: There is no abdominal tenderness  Comments: Obese, protuberant   Musculoskeletal:      Cervical back: Neck supple  Right lower leg: Edema present  Left lower leg: Edema present  Comments: Normal muscle bulk and tone for age and activity level   Skin:     General: Skin is warm and dry  Neurological:      Mental Status: He is alert  Comments: Awake and alert, answers questions appropriately    Psychiatric:      Comments: Somewhat flat affect       Lab Results:     I have personally reviewed pertinent lab results      I have personally reviewed the following imaging study reports in PACS:    Chest x-ray 4/12/2    Therapies:   PT:  Pending  OT:  Pending    VTE Prophylaxis: Heparin    Code Status: Level 1 - Full Code  Advance Directive and Living Will:      Power of :    POLST:      Family and Social Support:  Son and family who live in [facility]: go home

## 2022-04-13 NOTE — PLAN OF CARE
Problem: PHYSICAL THERAPY ADULT  Goal: Performs mobility at highest level of function for planned discharge setting  See evaluation for individualized goals  Description: Treatment/Interventions: Functional transfer training,LE strengthening/ROM,Elevations,Therapeutic exercise,Endurance training,Equipment eval/education,Bed mobility,Gait training,Spoke to nursing,Spoke to case management,OT  Equipment Recommended: Walker (cont using it at this time; pt informed)       See flowsheet documentation for full assessment, interventions and recommendations  Note: Prognosis: Good  Problem List: Decreased strength,Decreased endurance,Impaired balance,Decreased mobility  Assessment: Pt is 64 y o  male admitted with Dx of Severe aortic stenosis and underwent Transcatheter aortic valve replacement with a 26 mm [address] CHUCK 3 ULTRA bioprosthetic valve via left transfemoral approach on 4/12/2022  Pt 's comorbidities affecting POC include: Arthritis, AVB (atrioventricular block), CAD (coronary artery disease), CHF (congestive heart failure) ([facility] [address] ), Gait instability, History of CVA (cerebrovascular accident), Hypertension, and Obesity and personal factors of: living alone and MOOK  Pt's clinical presentation is currently unstable/unpredictable which is evident in ongoing telem monitoring w/ a-line in place, abn lab values and  need for min assist w/ amb w/ use of rw when usually mobilizing independently w/ use of SPC outside only  Pt presents w/ min overall weakness, decreased endurance and inconsistent amb balance and gait patterns requiring use of rw at this time  Will cont to follow pt in PT for progressive mobilization to max level of (I), endurance, and safety  Otherwise, anticipate pt will return home w/ available family support upon D/C provided he cont improving w/ mobility skills, safety, and endurance and when medically cleared; home PT follow up is recommended; will follow             PT Discharge Recommendation: Home with home health rehabilitation (home PT)          See flowsheet documentation for full assessment

## 2022-04-14 NOTE — UTILIZATION REVIEW
Notification of Discharge   This is a Notification of Discharge from our facility [address]  Please be advised that this patient has been discharge from our facility  Below you will find the admission and discharge date and time including the patients disposition  UTILIZATION REVIEW CONTACT:  Vonda Deleon  Utilization   Network Utilization Review Department  Phone: [phone] x carefully listen to the prompts  All voicemails are confidential   Email: [email or web address]     PHYSICIAN ADVISORY SERVICES:  FOR LAEP-YH-STND REVIEW - MEDICAL NECESSITY DENIAL  Phone: [phone]  Fax: [phone]  Email: [email or web address]     PRESENTATION DATE: 4/12/2022  6:41 AM  OBERVATION ADMISSION DATE:   INPATIENT ADMISSION DATE: 4/12/22  9:29 AM   DISCHARGE DATE: 4/13/2022  1:50 PM  DISPOSITION: Home with [place] with [address] INFORMATION:  Send all requests for admission clinical reviews, approved or denied determinations and any other requests to dedicated fax number below belonging to the campus where the patient is receiving treatment   List of dedicated fax numbers:  [address] DENIALS (Administrative/Medical Necessity) [phone]   [address] (Maternity/NICU/Pediatrics) [phone]   [facility] [phone]   [address] [facility] [phone]   [address] [place] [address] [phone]   [address] [phone]   [facility]  [phone]   [address] [place] [address], [address]  2401 [facility] 1000 [address] [facility] [phone]

## 2022-04-18 ENCOUNTER — TELEPHONE (OUTPATIENT)
Dept: CARDIAC SURGERY | Facility: CLINIC | Age: 77
End: 2022-04-18

## 2022-04-18 NOTE — TELEPHONE ENCOUNTER
Called patient to perform routine post op follow up   No answer, left voicemail to call office at [phone]

## 2022-04-28 ENCOUNTER — OFFICE VISIT (OUTPATIENT)
Dept: CARDIOLOGY CLINIC | Facility: CLINIC | Age: 77
End: 2022-04-28
Payer: COMMERCIAL

## 2022-04-28 VITALS
OXYGEN SATURATION: 97 % | HEART RATE: 53 BPM | BODY MASS INDEX: 32.45 KG/M2 | DIASTOLIC BLOOD PRESSURE: 78 MMHG | WEIGHT: 226.7 LBS | SYSTOLIC BLOOD PRESSURE: 138 MMHG | HEIGHT: 70 IN

## 2022-04-28 DIAGNOSIS — I25.10 CORONARY ARTERY DISEASE INVOLVING NATIVE HEART WITHOUT ANGINA PECTORIS, UNSPECIFIED VESSEL OR LESION TYPE: ICD-10-CM

## 2022-04-28 DIAGNOSIS — E78.2 MIXED HYPERLIPIDEMIA: ICD-10-CM

## 2022-04-28 DIAGNOSIS — I35.9 AORTIC VALVE DISORDER: ICD-10-CM

## 2022-04-28 DIAGNOSIS — I10 PRIMARY HYPERTENSION: ICD-10-CM

## 2022-04-28 DIAGNOSIS — R00.1 BRADYCARDIA: Primary | ICD-10-CM

## 2022-04-28 PROCEDURE — 93000 ELECTROCARDIOGRAM COMPLETE: CPT | Performed by: PHYSICIAN ASSISTANT

## 2022-04-28 PROCEDURE — 1036F TOBACCO NON-USER: CPT | Performed by: PHYSICIAN ASSISTANT

## 2022-04-28 PROCEDURE — 3075F SYST BP GE 130 - 139MM HG: CPT | Performed by: PHYSICIAN ASSISTANT

## 2022-04-28 PROCEDURE — 1160F RVW MEDS BY RX/DR IN RCRD: CPT | Performed by: PHYSICIAN ASSISTANT

## 2022-04-28 PROCEDURE — 99214 OFFICE O/P EST MOD 30 MIN: CPT | Performed by: PHYSICIAN ASSISTANT

## 2022-04-28 PROCEDURE — 3078F DIAST BP <80 MM HG: CPT | Performed by: PHYSICIAN ASSISTANT

## 2022-04-28 RX ORDER — CARVEDILOL 6.25 MG/1
6.25 TABLET ORAL 2 TIMES DAILY WITH MEALS
Qty: 60 TABLET | Refills: 3
Start: 2022-04-28 | End: 2022-06-22 | Stop reason: SDUPTHER

## 2022-04-28 NOTE — PROGRESS NOTES
[facility]  [address] [place] [id number]  Cardiology Follow Up    Irvin Miller [facility]  1945  [id number]    1  Bradycardia  POCT ECG    AMB event recorder   2  Coronary artery disease involving native heart without angina pectoris, unspecified vessel or lesion type  carvedilol (COREG) 6 25 mg tablet   3  Primary hypertension     4  Aortic valve disorder     5  Mixed hyperlipidemia         Discussion/Summary:  1  Bradycardia, heart rate today 53, EKG confirmed sinus bradycardia with first-degree AV block, [place] interval 228 milliseconds  Will decrease Coreg back to 6 25 b i d   Patient advised to monitor blood pressure and heart rate at home  Patient also had a ZIO patch placed for 1 week to assess for further [facility] [place]ias  2  CAD with history of CABG, currently stable without anginal symptoms  Continue amlodipine, aspirin, Coreg, Plavix, olmesartan/HCTZ  Stress test in February showed no ischemia  3  Hypertension, currently stable 138/78  Continue amlodipine, Coreg, olmesartan/HCTZ  4  Aortic valve disorder status post TAVR with 26 mm Plaza Kasey S3 ultra valve on 4/12/2022  Patient is due to see CT surgery next month  Continue with all restrictions as previously set up by CT surgery  Continue with antibiotic prophylaxis as needed  5  Hyperlipidemia on Lipitor    Continue medications  Decrease Coreg to 6 25 b i d  given bradycardia  Will order ZIO patch for 1 week to assess for further bradyarrhythmias  Continue all other medications  Report any bleeding on aspirin and Plavix  Follow up with CT surgery  Report any symptoms  Follow with primary care physician  All questions answered  Continue all medications  Previous studies reviewed with patient, medications reviewed and possible side effects discussed  Continue risk factor modification   Optimize weight, regular exercise and follow up with appropriate specialists and primary care physician as discussed  All questions answered  Patient advised to report any problems prompting to medical attention  Return for follow up visit in 3-4 months or earlier if needed    Chief Complaint   Patient presents with   [facility] follow up     TAVR 4/12/22       Interval History:  Patient presents to the office today for routine follow-up visit after having TAVR done about 2 weeks ago  Patient had a pretty uncomplicated postoperative course and was discharged on postop day 1  At that time patient's Coreg was increased because of high blood pressure  Patient states since being home he has been feeling well, denies any chest pain, chest pressure, chest heaviness  He denies any shortness of breath, syncope, palpitations  Patient had 26 mm  Plaza Kasey S3 ultra valve placed on 4/12/2022  Patient is taking all medications without side effect  Denies any bleeding troubles on aspirin and Plavix  Patient has completed 5 day course of torsemide and potassium      Past medical history:  CAD, CABG, aortic stenosis, hypertension, hyperlipidemia    Patient Active Problem List   Diagnosis    Allergic rhinitis    Severe aortic stenosis    CAD (coronary artery disease)    Hypertension    BPH with obstruction/lower urinary tract symptoms    Gait instability    Hyperlipidemia    Insomnia    Male erectile dysfunction    Pulmonary nodule seen on imaging study    History of stroke    S/P TAVR (transcatheter aortic valve replacement)    Former tobacco use    CHF (congestive heart failure) (HCC)    Hyperchloremia    AVB (atrioventricular block)     Past Medical History:   Diagnosis Date    Arthritis     AVB (atrioventricular block)     1st degree    BPH (benign prostatic hyperplasia)     CAD (coronary artery disease)     s/p CABGx2    CHF (congestive heart failure) (HCC)     Chronic rhinitis     Former tobacco use     Gait instability     History of CVA (cerebrovascular accident)     w/ residual left-sided weakness    Hyperlipidemia     Hypertension     Insomnia     Obesity     BMI 32 82    Pulmonary nodule     2cm    Severe aortic stenosis     Vitamin D deficiency      Social History     Socioeconomic History    Marital status: Single     Spouse name: Not on file    Number of children: Not on file    Years of education: Not on file    Highest education level: Not on file   Occupational History    Not on file   Tobacco Use    Smoking status: Former Smoker     Packs/day: 1 50     Years: 4 00     Pack years: 6 00     Types: Cigarettes     Quit date:      Years since quittin 3    Smokeless tobacco: Never Used    Tobacco comment: former light tobacco smoker   Vaping Use    Vaping Use: Never used   Substance and Sexual Activity    Alcohol use: Not Currently    Drug use: No    Sexual activity: Never   Other Topics Concern    Not on file   Social History Narrative    Caffeine use    No advance directives     Social Determinants of Health     Financial Resource Strain: Not on file   Food Insecurity: Not on file   Transportation Needs: Not on file   Physical Activity: Not on file   Stress: Not on file   Social Connections: Not on file   Intimate Partner Violence: Not on file   Housing Stability: Not on file      Family History   Problem Relation Age of Onset    Coronary artery disease Mother     Arthritis Mother     Hypertension Mother     Stroke Mother     Other Father         aortic valve replacement; CABG    Arthritis Father     Hypertension Brother     Stroke Brother      Past Surgical History:   Procedure Laterality Date    CARDIAC CATHETERIZATION N/A 2022    Procedure: CARDIAC TAVR;  Surgeon: Fitz Sesay DO;  Location: [facility];  Service: Cardiology    CORONARY ARTERY BYPASS GRAFT  2004    x2 LIMA-LAD, SVG-RCA    MI REPLACE AORTIC VALVE OPENFEMORAL ARTERY APPROACH N/A 2022    Procedure: REPLACEMENT AORTIC VALVE TRANSCATHETER (TAVR) TRANSFEMORAL W/ 26MM LOPEZ CHUCK S3 ULTRA VALVE(ACCESS ON LEFT) LORIE;  Surgeon: Sandra Bonilla DO;  Location[facility];  Service: Cardiac Surgery    TONSILLECTOMY         Current Outpatient Medications:     acetaminophen (TYLENOL) 650 mg CR tablet, Take 1 tablet (650 mg total) by mouth every 8 (eight) hours as needed for mild pain, Disp: 30 tablet, Rfl: 0    amLODIPine (NORVASC) 5 mg tablet, Take 1 tablet (5 mg total) by mouth daily, Disp: 90 tablet, Rfl: 2    aspirin (ASPIRIN LOW DOSE) 81 mg EC tablet, Take 1 tablet by mouth daily, Disp: , Rfl:     atorvastatin (LIPITOR) 40 mg tablet, Take 1 tablet (40 mg total) by mouth daily, Disp: 90 tablet, Rfl: 2    carvedilol (COREG) 6 25 mg tablet, Take 1 tablet (6 25 mg total) by mouth 2 (two) times a day with meals, Disp: 60 tablet, Rfl: 3    Cholecalciferol (VITAMIN D3) 5000 units CAPS, Take by mouth daily , Disp: , Rfl:     clopidogrel (PLAVIX) 75 mg tablet, Take 1 tablet (75 mg total) by mouth daily, Disp: 90 tablet, Rfl: 3    finasteride (PROSCAR) 5 mg tablet, Take 1 tablet (5 mg total) by mouth daily, Disp: 90 tablet, Rfl: 2    mirtazapine (REMERON) 15 mg tablet, TAKE ONE TABLET BY MOUTH EVERY DAY, Disp: 90 tablet, Rfl: 2    olmesartan-hydrochlorothiazide (BENICAR HCT) 40-25 MG per tablet, Take 1 tablet by mouth daily, Disp: 90 tablet, Rfl: 2  No Known Allergies      Imaging: XR chest portable    Result Date: 4/13/2022  Narrative: CHEST INDICATION:   Post Open Heart Surgey  COMPARISON:  Compared with 4/12/2022 EXAM PERFORMED/VIEWS:  XR CHEST PORTABLE FINDINGS:  Right neck catheter in the cavoatrial region  Sternotomy wires  TAVR identified  Cardiomediastinal silhouette appears unremarkable  Poor inspiration  Round left lower lobe nodule measuring 1 6 cm is unchanged  Crowding in the right infrahilar region  The lungs are otherwise clear  No pneumothorax or pleural effusion  Osseous structures appear within normal limits for patient age       Impression: No acute cardiopulmonary disease  Workstation performed: [id number]     Cardiac catheterization    Result Date: 2022  Narrative: OPERATIVE REPORT PATIENT NAME: Jere Cranker  :  1945 MRN: [id number] Pt Location: [facility] ROOM 02 SURGERY DATE: 2022 Surgeon(s) and Role: Panel 1:    * Andie Summers DO - Primary    * Tanya Ansari PA-C - Assisting Panel 2:    * Fitz Sesay DO - Primary Co-Surgeons: Lisa Hyman DO; Andie Summers DO PREOPERATIVE DIAGNOSIS Symptomatic severe aortic stenosis  POSTOPERATIVE DIAGNOSIS Symptomatic severe aortic stenosis  PROCEDURE Transcatheter aortic valve replacement with a 26 mm Plaza CHUCK 3 ULTRA bioprosthetic valve via a percutaneous left transfemoral approach  ANESTHESIA Dr Jannette Mendoza, [place], general endotracheal anesthesia with transesophageal echocardiogram guidance  After informed consent was obtained, patient brought to hybrid operating room in fasting state  Time out was performed  Using modified seldinger technique sheaths were placed in the left   femoral artery and vein respectively  The left   femoral artery was also used for placement of delivery sheath  The vessel was accessed using modified seldinger technique  Using fluoroscopy a temporary wire was advanced into RV apex through transfemoral sheath  The coplanar view was obtained using pigtail catheter placed in ascending aorta with subsequent ascending aortography  The TAVR delivery sheath was ulltimately advanced over a stiff wire  Next the 26 mm Plaza CHUCK 3 ULTRA valve was implanted using rapid pacing with fluoro and LORIE guidance  There was no significant paravalvular leak appreciated post implant  The sheaths were removed and hemostasis obtained by manual compression of the venous sheath  The TAVR delivery sheath was removed and percutaneous closure was obtained using Preclose technique with two Proglide devices deployed pre sheath insertion  Patient left the hybrid operating room in stable condition  Impression  Successful 26 mm Plaza CHUCK 3 ULTRA transcatheter aortic valve replacement  SIGNATURE: Reji Calle DO DATE: April 12, 2022 TIME: 11:21 AM NOTE: A cardiac surgeon as co-surgeon was required as is a CMS requirement as well as needed for conventional open (non catheter based) surgical skills should become necessary  LORIE Anesthesia    Result Date: 4/12/2022  Narrative: Azeb Singh MD     4/12/2022 12:48 PM Procedure Performed: LORIE Anesthesia Start Time:  4/12/2022 10:37 AM Preanesthesia Checklist Patient identified, IV assessed, risks and benefits discussed, monitors and equipment assessed, procedure being performed at surgeon's request and anesthesia consent obtained  Procedure Diagnostic Indications for LORIE:  assessment of surgical repair and hemodynamic monitoring  Type of LORIE: interventional LORIE with real time guidance of intracardiac procedure  Images Saved: ultrasound permanent image saved  Physician Requesting Echo: Sandra Bonilla DO  Location performed: OR  Intubated  Bite block not placed  Heart visualized  Insertion of LORIE Probe:  Atraumatic  Probe Type:  Multiplane  Modalities:  3D, color flow mapping, continuous wave Doppler and pulse wave Doppler  Echocardiographic and Doppler Measurements PREPROCEDURE LEFT VENTRICLE: Systolic Function: normal  Ejection Fraction: 65%  Cavity size: normal  RIGHT VENTRICLE: Systolic Function: normal   Cavity size normal  No hypertrophy  AORTIC VALVE: Leaflets: trileaflet  Leaflet motions restricted  Stenosis: severe  Regurgitation: none  MITRAL VALVE: Leaflets: normal  Leaflet Motions: normal  Regurgitation: mild  Stenosis: none  TRICUSPID VALVE: Leaflets: normal  Leaflet Motions: normal  Stenosis: none  Regurgitation: mild  PULMONIC VALVE: Leaflets: normal  Regurgitation: none  Stenosis: none  ASCENDING AORTA: Size:  normal  Dissection not present    AORTIC ARCH: Size: normal  dissection not present  Grade 2: severe intimal thickening without protruding atheroma  DESCENDING AORTA: Size: normal  Dissection not present  Grade 2: severe intimal thickening without protruding atheroma  RIGHT ATRIUM: Size:  normal  No spontaneous echo contrast  LEFT ATRIUM: Size: normal  No spontaneous echo contrast  LEFT ATRIAL APPENDAGE: Size: normal  No spontaneous echo contrast ATRIAL SEPTUM: Intra-atrial septal morphology: normal  VENTRICULAR SEPTUM: Intra-ventricular septum morphology: normal  EPIAORTIC: Plaque Thickness: 0-5 mm  OTHER FINDINGS: Pericardium:  normal  Pleural Effusion:  none  POSTPROCEDURE LEFT VENTRICLE: Unchanged   RIGHT VENTRICLE: Unchanged   AORTIC VALVE: Leaflets: bioprosthetic  Stenosis: none  Regurgitation: none  Valve Size: 26 mm  MITRAL VALVE: Unchanged   TRICUSPID VALVE: Unchanged   PULMONIC VALVE: Unchanged ATRIA: Unchanged   AORTA: Unchanged   REMOVAL: Probe Removal: atraumatic  XR chest portable ICU    Result Date: 4/12/2022  Narrative: CHEST INDICATION:   Post TAVR  COMPARISON:  Chest radiograph from 10/18/2010 and chest CT from 3/10/2022  EXAM PERFORMED/VIEWS:  AP PORTABLE  FINDINGS: Right jugular catheter in SVC  Cardiomediastinal silhouette appears unremarkable  Post TAVR  Lungs clear  No effusion or pneumothorax  Redemonstration of benign left lower lobe nodule  Osseous structures within normal limits for age  Impression: No acute cardiopulmonary disease  Post TAVR  Workstation performed: [id number]     LORIE intraop interventional w/realtime guidance of cardiac procedures    Result Date: 4/12/2022  Narrative: This order contains the linked images for the LORIE that was performed by the Anesthesiologist   Please see the  CARDIAC LORIE ANESTHESIA procedure for results      Echo complete w/ contrast if indicated    Result Date: 4/13/2022  Narrative: Zabrina Philippe  Left Ventricle: Left ventricular cavity size is normal  Wall thickness is normal  The left ventricular ejection fraction is 65%  Systolic function is normal  Wall motion is normal  Diastolic function is mildly abnormal, consistent with grade I (abnormal) relaxation    IVS: There is sigmoid appearance of the septum    Right Ventricle: Right ventricular cavity size is mildly dilated    Left Atrium: The atrium is mildly dilated    Right Atrium: The atrium is mildly dilated    Aortic Valve: There is an Plaza CHUCK 3 Ultra 26 mm TAVR bioprosthetic valve  The prosthetic valve appears well-seated and appears to be functioning normally  There is no evidence of paravalvular regurgitation  There is no evidence of transvalvular regurgitation  The aortic valve peak gradient is 21 mmHg  The aortic valve mean gradient is 12 mmHg  The aortic valve area is 2 07 cm2  EKG:  Sinus bradycardia, first-degree AV block, moderate voltage criteria for LVH, heart rate 53, CT interval 228 milliseconds    Review of Systems:  Review of Systems   Constitutional: Negative  Respiratory: Negative  Cardiovascular: Negative  Neurological: Negative  Hematological: Negative  Psychiatric/Behavioral: Negative  All other systems reviewed and are negative  /78 (BP Location: Left arm, Patient Position: Sitting, Cuff Size: Standard)   Pulse (!) 53   Ht 5' 10" (1 778 m)   Wt 103 kg (226 lb 11 2 oz)   SpO2 97%   BMI 32 53 kg/m²     Physical Exam:  Physical Exam  Vitals and nursing note reviewed  Constitutional:       Appearance: Normal appearance  HENT:      Head: Normocephalic and atraumatic  Cardiovascular:      Rate and Rhythm: Regular rhythm  Bradycardia present  Heart sounds: Murmur heard  Pulmonary:      Effort: Pulmonary effort is normal       Breath sounds: Normal breath sounds  Musculoskeletal:         General: Normal range of motion  Cervical back: Normal range of motion and neck supple  Skin:     General: Skin is warm and dry  Neurological:      General: No focal deficit present  Mental Status: He is alert and oriented to person, place, and time  Psychiatric:         Mood and Affect: Mood normal          Behavior: Behavior normal          Thought Content:  Thought content normal          Judgment: Judgment normal

## 2022-04-28 NOTE — PATIENT INSTRUCTIONS
Continue medications  Decrease Coreg to 6 25 b i d  given bradycardia  Will order ZIO patch for 1 week to assess for further bradyarrhythmias  Continue all other medications  Report any bleeding on aspirin and Plavix  Follow up with CT surgery  Report any symptoms  Follow with primary care physician  All questions answered  Continue all medications  Previous studies reviewed with patient, medications reviewed and possible side effects discussed  Continue risk factor modification  Optimize weight, regular exercise and follow up with appropriate specialists and primary care physician as discussed  All questions answered  Patient advised to report any problems prompting to medical attention   Return for follow up visit in 3-4 months or earlier if needed

## 2022-05-11 ENCOUNTER — APPOINTMENT (OUTPATIENT)
Dept: LAB | Facility: HOSPITAL | Age: 77
End: 2022-05-11
Attending: INTERNAL MEDICINE
Payer: COMMERCIAL

## 2022-05-11 ENCOUNTER — HOSPITAL ENCOUNTER (OUTPATIENT)
Dept: NON INVASIVE DIAGNOSTICS | Facility: HOSPITAL | Age: 77
Discharge: HOME | End: 2022-05-11
Payer: COMMERCIAL

## 2022-05-11 ENCOUNTER — OFFICE VISIT (OUTPATIENT)
Dept: CARDIAC SURGERY | Facility: CLINIC | Age: 77
End: 2022-05-11
Payer: COMMERCIAL

## 2022-05-11 ENCOUNTER — CLINICAL SUPPORT (OUTPATIENT)
Dept: CARDIOLOGY CLINIC | Facility: CLINIC | Age: 77
End: 2022-05-11
Payer: COMMERCIAL

## 2022-05-11 VITALS
RESPIRATION RATE: 18 BRPM | BODY MASS INDEX: 32.21 KG/M2 | OXYGEN SATURATION: 98 % | SYSTOLIC BLOOD PRESSURE: 138 MMHG | DIASTOLIC BLOOD PRESSURE: 68 MMHG | HEIGHT: 70 IN | WEIGHT: 225 LBS | HEART RATE: 67 BPM

## 2022-05-11 VITALS
DIASTOLIC BLOOD PRESSURE: 78 MMHG | HEART RATE: 52 BPM | BODY MASS INDEX: 32.35 KG/M2 | SYSTOLIC BLOOD PRESSURE: 138 MMHG | HEIGHT: 70 IN | WEIGHT: 226 LBS

## 2022-05-11 DIAGNOSIS — I35.9 AORTIC VALVE DISORDER: ICD-10-CM

## 2022-05-11 DIAGNOSIS — Z01.818 PRE-OP TESTING: ICD-10-CM

## 2022-05-11 DIAGNOSIS — I10 BENIGN ESSENTIAL HYPERTENSION: ICD-10-CM

## 2022-05-11 DIAGNOSIS — Z95.2 S/P TAVR (TRANSCATHETER AORTIC VALVE REPLACEMENT): ICD-10-CM

## 2022-05-11 DIAGNOSIS — R00.1 BRADYCARDIA: ICD-10-CM

## 2022-05-11 DIAGNOSIS — I35.0 NONRHEUMATIC AORTIC VALVE STENOSIS: ICD-10-CM

## 2022-05-11 DIAGNOSIS — E78.2 MIXED HYPERLIPIDEMIA: ICD-10-CM

## 2022-05-11 DIAGNOSIS — Z95.2 S/P TAVR (TRANSCATHETER AORTIC VALVE REPLACEMENT): Primary | ICD-10-CM

## 2022-05-11 LAB
ALBUMIN SERPL BCP-MCNC: 3.7 G/DL (ref 3.5–5)
ALP SERPL-CCNC: 103 U/L (ref 46–116)
ALT SERPL W P-5'-P-CCNC: 33 U/L (ref 12–78)
ANION GAP SERPL CALCULATED.3IONS-SCNC: 1 MMOL/L (ref 4–13)
AORTIC ROOT: 3.1 CM
AORTIC VALVE MEAN VELOCITY: 13.2 M/S
APICAL FOUR CHAMBER EJECTION FRACTION: 70 %
ASCENDING AORTA: 3.5 CM
AST SERPL W P-5'-P-CCNC: 39 U/L (ref 5–45)
ATRIAL RATE: 52 BPM
AV AREA BY CONTINUOUS VTI: 2 CM2
AV AREA PEAK VELOCITY: 1.7 CM2
AV LVOT MEAN GRADIENT: 3 MMHG
AV LVOT PEAK GRADIENT: 5 MMHG
AV MEAN GRADIENT: 8 MMHG
AV PEAK GRADIENT: 16 MMHG
AV VALVE AREA: 1.97 CM2
AV VELOCITY RATIO: 0.54
BASOPHILS # BLD AUTO: 0.02 THOUSANDS/ΜL (ref 0–0.1)
BASOPHILS NFR BLD AUTO: 0 % (ref 0–1)
BILIRUB SERPL-MCNC: 1.45 MG/DL (ref 0.2–1)
BUN SERPL-MCNC: 26 MG/DL (ref 5–25)
CALCIUM SERPL-MCNC: 9.8 MG/DL (ref 8.3–10.1)
CHLORIDE SERPL-SCNC: 104 MMOL/L (ref 100–108)
CO2 SERPL-SCNC: 32 MMOL/L (ref 21–32)
CREAT SERPL-MCNC: 1.3 MG/DL (ref 0.6–1.3)
DOP CALC AO PEAK VEL: 2.03 M/S
DOP CALC AO VTI: 49.39 CM
DOP CALC LVOT AREA: 3.14 CM2
DOP CALC LVOT DIAMETER: 2 CM
DOP CALC LVOT PEAK VEL VTI: 30.98 CM
DOP CALC LVOT PEAK VEL: 1.1 M/S
DOP CALC LVOT STROKE INDEX: 42.3 ML/M2
DOP CALC LVOT STROKE VOLUME: 97.28 CM3
E WAVE DECELERATION TIME: 184 MS
EOSINOPHIL # BLD AUTO: 0.06 THOUSAND/ΜL (ref 0–0.61)
EOSINOPHIL NFR BLD AUTO: 1 % (ref 0–6)
ERYTHROCYTE [DISTWIDTH] IN BLOOD BY AUTOMATED COUNT: 12.4 % (ref 11.6–15.1)
FRACTIONAL SHORTENING: 28 % (ref 28–44)
GFR SERPL CREATININE-BSD FRML MDRD: 52 ML/MIN/1.73SQ M
GLUCOSE P FAST SERPL-MCNC: 121 MG/DL (ref 65–99)
HCT VFR BLD AUTO: 41.3 % (ref 36.5–49.3)
HGB BLD-MCNC: 14.5 G/DL (ref 12–17)
IMM GRANULOCYTES # BLD AUTO: 0.03 THOUSAND/UL (ref 0–0.2)
IMM GRANULOCYTES NFR BLD AUTO: 0 % (ref 0–2)
INTERVENTRICULAR SEPTUM IN DIASTOLE (PARASTERNAL SHORT AXIS VIEW): 1 CM
INTERVENTRICULAR SEPTUM: 1.3 CM (ref 0.6–1.1)
LAAS-AP2: 22.8 CM2
LAAS-AP4: 20.5 CM2
LEFT ATRIUM SIZE: 4.2 CM
LEFT INTERNAL DIMENSION IN SYSTOLE: 2.9 CM (ref 2.1–4)
LEFT VENTRICULAR INTERNAL DIMENSION IN DIASTOLE: 4 CM (ref 3.5–6)
LEFT VENTRICULAR POSTERIOR WALL IN END DIASTOLE: 1 CM
LEFT VENTRICULAR STROKE VOLUME: 39 ML
LVSV (TEICH): 39 ML
LYMPHOCYTES # BLD AUTO: 1.22 THOUSANDS/ΜL (ref 0.6–4.47)
LYMPHOCYTES NFR BLD AUTO: 16 % (ref 14–44)
MCH RBC QN AUTO: 32.8 PG (ref 26.8–34.3)
MCHC RBC AUTO-ENTMCNC: 35.1 G/DL (ref 31.4–37.4)
MCV RBC AUTO: 93 FL (ref 82–98)
MONOCYTES # BLD AUTO: 0.63 THOUSAND/ΜL (ref 0.17–1.22)
MONOCYTES NFR BLD AUTO: 8 % (ref 4–12)
MV E'TISSUE VEL-LAT: 9 CM/S
MV E'TISSUE VEL-SEP: 7 CM/S
MV PEAK A VEL: 0.69 M/S
MV PEAK E VEL: 84 CM/S
MV STENOSIS PRESSURE HALF TIME: 53 MS
MV VALVE AREA P 1/2 METHOD: 4.15 CM2
NEUTROPHILS # BLD AUTO: 5.53 THOUSANDS/ΜL (ref 1.85–7.62)
NEUTS SEG NFR BLD AUTO: 75 % (ref 43–75)
NRBC BLD AUTO-RTO: 0 /100 WBCS
P AXIS: 29 DEGREES
PLATELET # BLD AUTO: 148 THOUSANDS/UL (ref 149–390)
PMV BLD AUTO: 9.8 FL (ref 8.9–12.7)
POTASSIUM SERPL-SCNC: 4.4 MMOL/L (ref 3.5–5.3)
PR INTERVAL: 206 MS
PROT SERPL-MCNC: 7.7 G/DL (ref 6.4–8.2)
QRS AXIS: -8 DEGREES
QRSD INTERVAL: 96 MS
QT INTERVAL: 430 MS
QTC INTERVAL: 399 MS
RBC # BLD AUTO: 4.42 MILLION/UL (ref 3.88–5.62)
RIGHT ATRIUM AREA SYSTOLE A4C: 16.3 CM2
RIGHT VENTRICLE ID DIMENSION: 3.1 CM
SL CV LEFT ATRIUM LENGTH A2C: 6 CM
SL CV LV EF: 65
SL CV PED ECHO LEFT VENTRICLE DIASTOLIC VOLUME (MOD BIPLANE) 2D: 72 ML
SL CV PED ECHO LEFT VENTRICLE SYSTOLIC VOLUME (MOD BIPLANE) 2D: 33 ML
SODIUM SERPL-SCNC: 137 MMOL/L (ref 136–145)
T WAVE AXIS: 9 DEGREES
VENTRICULAR RATE: 52 BPM
WBC # BLD AUTO: 7.49 THOUSAND/UL (ref 4.31–10.16)

## 2022-05-11 PROCEDURE — 93010 ELECTROCARDIOGRAM REPORT: CPT | Performed by: INTERNAL MEDICINE

## 2022-05-11 PROCEDURE — 99214 OFFICE O/P EST MOD 30 MIN: CPT | Performed by: NURSE PRACTITIONER

## 2022-05-11 PROCEDURE — 80053 COMPREHEN METABOLIC PANEL: CPT

## 2022-05-11 PROCEDURE — C8929 TTE W OR WO FOL WCON,DOPPLER: HCPCS

## 2022-05-11 PROCEDURE — 85025 COMPLETE CBC W/AUTO DIFF WBC: CPT

## 2022-05-11 PROCEDURE — 93306 TTE W/DOPPLER COMPLETE: CPT | Performed by: INTERNAL MEDICINE

## 2022-05-11 PROCEDURE — 1111F DSCHRG MED/CURRENT MED MERGE: CPT | Performed by: NURSE PRACTITIONER

## 2022-05-11 PROCEDURE — 93005 ELECTROCARDIOGRAM TRACING: CPT

## 2022-05-11 PROCEDURE — 93244 EXT ECG>48HR<7D REV&INTERPJ: CPT | Performed by: INTERNAL MEDICINE

## 2022-05-11 PROCEDURE — 36415 COLL VENOUS BLD VENIPUNCTURE: CPT

## 2022-05-11 RX ADMIN — PERFLUTREN 0.6 ML/MIN: 6.52 INJECTION, SUSPENSION INTRAVENOUS at 11:40

## 2022-05-11 NOTE — LETTER
May 11, 2022     Srikanth Mahajan MD  [address] [address]    Patient: Alexis Francois   YOB: 1945   Date of Visit: 5/11/2022       Dear Dr Kumar Lung: Thank you for referring Kim Norton to me for evaluation  Below are my notes for this consultation  If you have questions, please do not hesitate to call me  I look forward to following your patient along with you  Sincerely,        Montana Steele DO        CC: Almaz Hdz DO  [facility], [address]  5/11/2022  2:02 PM  Attested   POST OP FOLLOW UP VISIT S/P TAVR    Procedure: S/P Transcatheter aortic valve replacement with a 26 mm Plaza CHUCK 3 ULTRA bioprosthetic valve via left transfemoral approach, performed on 4/12/22  History: Alexis Francois is a 64y o  year old male who presents to our office today for routine follow up care from transfemoral transcatheter aortic valve replacement  Patient's PMHx includes: CAD s/p CABG x 2 (LIMA-LAD, SVG-RCA; 2004), HTN, HLD, 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, arthritis, BPH and stable lung nodule  Patient tolerated procedure well  His post op echo and ECG was stable  He was discharged home on POD # 1  Patient was seen by Cardiology for routine f/u  He wore an ambulatory monitor due to bradycardia on ECG  His Carvedilol dose was reduced  Today patient reports he is doing well  He is walking 1 mile everyday  He denies chest pain, SOB, lightheadedness, palpitations or fluid retention  He denies issues with his left groin site       Review of System:     History obtained from chart review and the patient  General ROS: negative  Psychological ROS: negative  Ophthalmic ROS: negative  ENT ROS: negative  Allergy and Immunology ROS: negative  Hematological and Lymphatic ROS: negative  Endocrine ROS: negative  Breast ROS: negative  Respiratory ROS: no cough, shortness of breath, or wheezing  Cardiovascular ROS: no chest pain or dyspnea on exertion  Gastrointestinal ROS: no abdominal pain, change in bowel habits, or black or bloody stools  Genito-Urinary ROS: no dysuria, trouble voiding, or hematuria  Musculoskeletal ROS: negative  Neurological ROS: no TIA or stroke symptoms  Dermatological ROS: negative    Vital Signs:     Vitals:    05/11/22 1320 05/11/22 1322   BP: 140/74 138/68   BP Location: Left arm Right arm   Patient Position: Sitting Sitting   Cuff Size: Standard Standard   Pulse: 67    Resp: 18    SpO2: 98%    Weight: 102 kg (225 lb)    Height: 5' 10" (1 778 m)        Home Medications:     Prior to Admission medications    Medication Sig Start Date End Date Taking?  Authorizing Provider   acetaminophen (TYLENOL) 650 mg CR tablet Take 1 tablet (650 mg total) by mouth every 8 (eight) hours as needed for mild pain 4/13/22 5/13/22  Irena Whitehead PA-C   amLODIPine (NORVASC) 5 mg tablet Take 1 tablet (5 mg total) by mouth daily 8/19/21   [facility], DO   aspirin (ASPIRIN LOW DOSE) 81 mg EC tablet Take 1 tablet by mouth daily 2/28/17   Historical Provider, MD   atorvastatin (LIPITOR) 40 mg tablet Take 1 tablet (40 mg total) by mouth daily 8/19/21   [facility], DO   carvedilol (COREG) 6 25 mg tablet Take 1 tablet (6 25 mg total) by mouth 2 (two) times a day with meals 4/28/22   Sobia Rob PA-C   Cholecalciferol (VITAMIN D3) 5000 units CAPS Take by mouth daily  1/13/14   Historical Provider, MD   clopidogrel (PLAVIX) 75 mg tablet Take 1 tablet (75 mg total) by mouth daily 11/30/21 4/28/22  [facility],    finasteride (PROSCAR) 5 mg tablet Take 1 tablet (5 mg total) by mouth daily 8/19/21   [facility], DO   mirtazapine (REMERON) 15 mg tablet TAKE ONE TABLET BY MOUTH EVERY DAY 3/29/22   [facility], DO   olmesartan-hydrochlorothiazide (BENICAR HCT) 40-25 MG per tablet Take 1 tablet by mouth daily 8/19/21   Regis Najera DO       Physical Exam:    General: Alert, oriented, well developed, no acute distress  HEENT/NECK: PERRLA  No jugular venous distention  Cardiac:Regular rate and rhythm, no murmurs rubs or gallops  Pulmonary:Breath sounds clear bilaterally  Abdomen:  Non-tender, Non-distended  Positive bowel sounds  Upper extremities: 2+ radial pulses; brisk capillary refill  Lower extremities: Extremities warm/dry  Left femoral pulse 2+, no hematoma; PT/DP pulses 1+ bilaterally  No edema B/L  Incisions: Inguinal puncture site is clean, dry, and intact  Musculoskeletal: MAEE, stable gait  Neuro: Alert and oriented X 3  Sensation is grossly intact  No focal deficits  Skin: Warm/Dry, without rashes or lesions  Lab Results:     Results from last 7 days   Lab Units 05/11/22  1219   WBC Thousand/uL 7 49   HEMOGLOBIN g/dL 14 5   HEMATOCRIT % 41 3   PLATELETS Thousands/uL 148*     Results from last 7 days   Lab Units 05/11/22  1219   POTASSIUM mmol/L 4 4   CHLORIDE mmol/L 104   CO2 mmol/L 32   BUN mg/dL 26*   CREATININE mg/dL 1 30   CALCIUM mg/dL 9 8       Imaging Studies:     Transthoracic Echocardiogram: today    Left Ventricle: Left ventricular cavity size is normal  Wall thickness is normal  The left ventricular ejection fraction is 65%  Systolic function is normal  Wall motion is normal  Diastolic function is normal     Left Atrium: The atrium is mildly dilated    Aortic Valve: There is an Plaza CHUCK 3 Ultra 26 mm TAVR bioprosthetic valve  The prosthetic valve appears well-seated and appears to be functioning normally  There is no evidence of paravalvular regurgitation  There is no evidence of transvalvular regurgitation  The aortic valve peak velocity is 2 03 m/s  The aortic valve mean gradient is 8 mmHg  The DVI is 0 54     Mitral Valve:  There is mild annular calcification        EKG: today    Sinus bradycardia with Premature supraventricular complexes  Inferior infarct , age undetermined    I have personally reviewed pertinent films in PACS     PCP and Cardiology notes reviewed     TAVR evaluation Assessment:     Herlinda Vernonrosa elena 122: I    Assessment:   Aortic stenosis, Non-Rheumatic  S/P transfemoral transcatheter aortic valve replacement    Oscar Zelaya is making good progress in their post-op recovery  They are at NYHA functional class I  Left groin incision is well healed  Weight and VS are stable  Recent echocardiogram demonstrates normally functioning TAVR bioprosthesis   ECG, BMP & CBC stable  Plan:   Medications reviewed with patient  Patient was on Plavix prior to TAVR and therefore he will remain it per the prescribing provider  Aspirin 81 mg daily is lifelong  Benefits of participating in cardiac rehabilitation discussed with patient and they are cleared to proceed with the program    May resume driving and all normal activities  Oscar Zelaya will return for one year follow-up in our office with repeat echocardiogram, ECG, CBC & BMP  Our office will contact patient to schedule appointment  They have been advised to maintain routine follow up with their cardiologist and PCP for ongoing medical care  Patient was comfortable with our recommendations and their questions were answered to their satisfaction  Routine referral to gastroenterology for colonoscopy screening was not indicated, as the patient is over 74years old    Rola Newby  5/11/22  1:44 PM  Attestation signed by Gabino Cadena DO at 5/11/2022  2:51 PM:  The patient was seen and examined, and I agree with the midlevel's history, physical exam, assessment and plan with the following additions:    Mr Hamida Palafox was seen in the office today after his TAVR  His postoperative echo was reviewed by myself personally and findings shared with him  This demonstrates a well-functioning TAVR no significant perivalvular regurgitation      I recommended outpatient cardiac rehab and return with a 1 year echocardiogram

## 2022-05-11 NOTE — PROGRESS NOTES
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, arthritis, BPH and stable lung nodule  Patient tolerated procedure well  His post op echo and ECG was stable  He was discharged home on POD # 1  Patient was seen by Cardiology for routine f/u  He wore an ambulatory monitor due to bradycardia on ECG  His Carvedilol dose was reduced  Today patient reports he is doing well  He is walking 1 mile everyday  He denies chest pain, SOB, lightheadedness, palpitations or fluid retention  He denies issues with his left groin site       Review of System:     History obtained from chart review and the patient  General ROS: negative  Psychological ROS: negative  Ophthalmic ROS: negative  ENT ROS: negative  Allergy and Immunology ROS: negative  Hematological and Lymphatic ROS: negative  Endocrine ROS: negative  Breast ROS: negative  Respiratory ROS: no cough, shortness of breath, or wheezing  Cardiovascular ROS: no chest pain or dyspnea on exertion  Gastrointestinal ROS: no abdominal pain, change in bowel habits, or black or bloody stools  Genito-Urinary ROS: no dysuria, trouble voiding, or hematuria  Musculoskeletal ROS: negative  Neurological ROS: no TIA or stroke symptoms  Dermatological ROS: negative    Vital Signs:     Vitals:    05/11/22 1320 05/11/22 1322   BP: 140/74 138/68   BP Location: Left arm Right arm   Patient Position: Sitting Sitting   Cuff Size: Standard Standard   Pulse: 67    Resp: 18    SpO2: 98%    Weight: 102 kg (225 lb)    Height: 5' 10" (1 778 m)        Home Medications:     Prior to Admission medications    Medication Sig Start Date End Date Taking? Authorizing Provider   acetaminophen (TYLENOL) 650 mg CR tablet Take 1 tablet (650 mg total) by mouth every 8 (eight) hours as needed for mild pain 4/13/22 5/13/22  Nahomi Brown PA-C   amLODIPine (NORVASC) 5 mg tablet Take 1 tablet (5 mg total) by mouth daily 8/19/21   [facility], DO   aspirin (ASPIRIN LOW DOSE) 81 mg EC tablet Take 1 tablet by mouth daily 2/28/17   Historical Provider, MD   atorvastatin (LIPITOR) 40 mg tablet Take 1 tablet (40 mg total) by mouth daily 8/19/21   [facility], DO   carvedilol (COREG) 6 25 mg tablet Take 1 tablet (6 25 mg total) by mouth 2 (two) times a day with meals 4/28/22   Pat Read PA-C   Cholecalciferol (VITAMIN D3) 5000 units CAPS Take by mouth daily  1/13/14   Historical Provider, MD   clopidogrel (PLAVIX) 75 mg tablet Take 1 tablet (75 mg total) by mouth daily 11/30/21 4/28/22  [facility], DO   finasteride (PROSCAR) 5 mg tablet Take 1 tablet (5 mg total) by mouth daily 8/19/21   [facility], DO   mirtazapine (REMERON) 15 mg tablet TAKE ONE TABLET BY MOUTH EVERY DAY 3/29/22   [facility], DO   olmesartan-hydrochlorothiazide (BENICAR HCT) 40-25 MG per tablet Take 1 tablet by mouth daily 8/19/21   [facility],        Physical Exam:    General: Alert, oriented, well developed, no acute distress  HEENT/NECK:  PERRLA  No jugular venous distention  Cardiac:Regular rate and rhythm, no murmurs rubs or gallops  Pulmonary:Breath sounds clear bilaterally  Abdomen:  Non-tender, Non-distended  Positive bowel sounds  Upper extremities: 2+ radial pulses; brisk capillary refill  Lower extremities: Extremities warm/dry  Left femoral pulse 2+, no hematoma; PT/DP pulses 1+ bilaterally  No edema B/L  Incisions: Inguinal puncture site is clean, dry, and intact  Musculoskeletal: MAEE, stable gait  Neuro: Alert and oriented X 3  Sensation is grossly intact    No focal deficits  Skin: Warm/Dry, without rashes or lesions  Lab Results:     Results from last 7 days   Lab Units 05/11/22  1219   WBC Thousand/uL 7 49   HEMOGLOBIN g/dL 14 5   HEMATOCRIT % 41 3   PLATELETS Thousands/uL 148*     Results from last 7 days   Lab Units 05/11/22  1219   POTASSIUM mmol/L 4 4   CHLORIDE mmol/L 104   CO2 mmol/L 32   BUN mg/dL 26*   CREATININE mg/dL 1 30   CALCIUM mg/dL 9 8       Imaging Studies:     Transthoracic Echocardiogram: today    Left Ventricle: Left ventricular cavity size is normal  Wall thickness is normal  The left ventricular ejection fraction is 65%  Systolic function is normal  Wall motion is normal  Diastolic function is normal     Left Atrium: The atrium is mildly dilated    Aortic Valve: There is an Plaza CHUCK 3 Ultra 26 mm TAVR bioprosthetic valve  The prosthetic valve appears well-seated and appears to be functioning normally  There is no evidence of paravalvular regurgitation  There is no evidence of transvalvular regurgitation  The aortic valve peak velocity is 2 03 m/s  The aortic valve mean gradient is 8 mmHg  The DVI is 0 54     Mitral Valve: There is mild annular calcification        EKG: today    Sinus bradycardia with Premature supraventricular complexes  Inferior infarct , age undetermined    I have personally reviewed pertinent films in PACS     PCP and Cardiology notes reviewed     TAVR evaluation Assessment:     Herlinda Ash 122: I    Assessment:   Aortic stenosis, Non-Rheumatic  S/P transfemoral transcatheter aortic valve replacement    Salomon Hector is making good progress in their post-op recovery  They are at NYHA functional class I  Left groin incision is well healed  Weight and VS are stable  Recent echocardiogram demonstrates normally functioning TAVR bioprosthesis   ECG, BMP & CBC stable  Plan:   Medications reviewed with patient  Patient was on Plavix prior to TAVR and therefore he will remain it per the prescribing provider  Aspirin 81 mg daily is lifelong  Benefits of participating in cardiac rehabilitation discussed with patient and they are cleared to proceed with the program    May resume driving and all normal activities  Eliud Monsalve will return for one year follow-up in our office with repeat echocardiogram, ECG, CBC & BMP  Our office will contact patient to schedule appointment  They have been advised to maintain routine follow up with their cardiologist and PCP for ongoing medical care  Patient was comfortable with our recommendations and their questions were answered to their satisfaction      Routine referral to gastroenterology for colonoscopy screening was not indicated, as the patient is over 74years old    Rola Croft  5/11/22  1:44 PM

## 2022-05-11 NOTE — LETTER
May 11, 2022     Homero Neri MD  [address] [address]    Patient: Salomon Hector   YOB: 1945   Date of Visit: 5/11/2022       Dear Dr Dian Abel: Thank you for referring Jennifer Nieves to me for evaluation  Below are my notes for this consultation  If you have questions, please do not hesitate to call me  I look forward to following your patient along with you  Sincerely,        Stana Kussmaul,         CC: Mina Sarabia, DO [place] [facility], [address]  5/11/2022  2:02 PM  Attested   POST OP FOLLOW UP VISIT S/P TAVR    Procedure: S/P Transcatheter aortic valve replacement with a 26 mm Plaza CHUCK 3 ULTRA bioprosthetic valve via left transfemoral approach, performed on 4/12/22  History: Salomon Hector is a 64y o  year old male who presents to our office today for routine follow up care from transfemoral transcatheter aortic valve replacement  Patient's PMHx includes: CAD s/p CABG x 2 (LIMA-LAD, SVG-RCA; 2004), HTN, HLD, 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, arthritis, BPH and stable lung nodule  Patient tolerated procedure well  His post op echo and ECG was stable  He was discharged home on POD # 1  Patient was seen by Cardiology for routine f/u  He wore an ambulatory monitor due to bradycardia on ECG  His Carvedilol dose was reduced  Today patient reports he is doing well  He is walking 1 mile everyday  He denies chest pain, SOB, lightheadedness, palpitations or fluid retention  He denies issues with his left groin site       Review of System:     History obtained from chart review and the patient  General ROS: negative  Psychological ROS: negative  Ophthalmic ROS: negative  ENT ROS: negative  Allergy and Immunology ROS: negative  Hematological and Lymphatic ROS: negative  Endocrine ROS: negative  Breast ROS: negative  Respiratory ROS: no cough, shortness of breath, or wheezing  Cardiovascular ROS: no chest pain or dyspnea on exertion  Gastrointestinal ROS: no abdominal pain, change in bowel habits, or black or bloody stools  Genito-Urinary ROS: no dysuria, trouble voiding, or hematuria  Musculoskeletal ROS: negative  Neurological ROS: no TIA or stroke symptoms  Dermatological ROS: negative    Vital Signs:     Vitals:    05/11/22 1320 05/11/22 1322   BP: 140/74 138/68   BP Location: Left arm Right arm   Patient Position: Sitting Sitting   Cuff Size: Standard Standard   Pulse: 67    Resp: 18    SpO2: 98%    Weight: 102 kg (225 lb)    Height: 5' 10" (1 778 m)        Home Medications:     Prior to Admission medications    Medication Sig Start Date End Date Taking?  Authorizing Provider   acetaminophen (TYLENOL) 650 mg CR tablet Take 1 tablet (650 mg total) by mouth every 8 (eight) hours as needed for mild pain 4/13/22 5/13/22  Irena Whitehead PA-C   amLODIPine (NORVASC) 5 mg tablet Take 1 tablet (5 mg total) by mouth daily 8/19/21   [facility] DO   aspirin (ASPIRIN LOW DOSE) 81 mg EC tablet Take 1 tablet by mouth daily 2/28/17   Historical Provider, MD   atorvastatin (LIPITOR) 40 mg tablet Take 1 tablet (40 mg total) by mouth daily 8/19/21   [facility], DO   carvedilol (COREG) 6 25 mg tablet Take 1 tablet (6 25 mg total) by mouth 2 (two) times a day with meals 4/28/22   Sobia Rob PA-C   Cholecalciferol (VITAMIN D3) 5000 units CAPS Take by mouth daily  1/13/14   Historical Provider, MD   clopidogrel (PLAVIX) 75 mg tablet Take 1 tablet (75 mg total) by mouth daily 11/30/21 4/28/22  [facility],    finasteride (PROSCAR) 5 mg tablet Take 1 tablet (5 mg total) by mouth daily 8/19/21   [facility], DO   mirtazapine (REMERON) 15 mg tablet TAKE ONE TABLET BY MOUTH EVERY DAY 3/29/22   [facility], DO   olmesartan-hydrochlorothiazide (BENICAR HCT) 40-25 MG per tablet Take 1 tablet by mouth daily 8/19/21   Regis Najera DO       Physical Exam:    General: Alert, oriented, well developed, no acute distress  HEENT/NECK: PERRLA  No jugular venous distention  Cardiac:Regular rate and rhythm, no murmurs rubs or gallops  Pulmonary:Breath sounds clear bilaterally  Abdomen:  Non-tender, Non-distended  Positive bowel sounds  Upper extremities: 2+ radial pulses; brisk capillary refill  Lower extremities: Extremities warm/dry  Left femoral pulse 2+, no hematoma; PT/DP pulses 1+ bilaterally  No edema B/L  Incisions: Inguinal puncture site is clean, dry, and intact  Musculoskeletal: MAEE, stable gait  Neuro: Alert and oriented X 3  Sensation is grossly intact  No focal deficits  Skin: Warm/Dry, without rashes or lesions  Lab Results:     Results from last 7 days   Lab Units 05/11/22  1219   WBC Thousand/uL 7 49   HEMOGLOBIN g/dL 14 5   HEMATOCRIT % 41 3   PLATELETS Thousands/uL 148*     Results from last 7 days   Lab Units 05/11/22  1219   POTASSIUM mmol/L 4 4   CHLORIDE mmol/L 104   CO2 mmol/L 32   BUN mg/dL 26*   CREATININE mg/dL 1 30   CALCIUM mg/dL 9 8       Imaging Studies:     Transthoracic Echocardiogram: today    Left Ventricle: Left ventricular cavity size is normal  Wall thickness is normal  The left ventricular ejection fraction is 65%  Systolic function is normal  Wall motion is normal  Diastolic function is normal     Left Atrium: The atrium is mildly dilated    Aortic Valve: There is an Plaza CHUCK 3 Ultra 26 mm TAVR bioprosthetic valve  The prosthetic valve appears well-seated and appears to be functioning normally  There is no evidence of paravalvular regurgitation  There is no evidence of transvalvular regurgitation  The aortic valve peak velocity is 2 03 m/s  The aortic valve mean gradient is 8 mmHg  The DVI is 0 54     Mitral Valve:  There is mild annular calcification        EKG: today    Sinus bradycardia with Premature supraventricular complexes  Inferior infarct , age undetermined    I have personally reviewed pertinent films in PACS     PCP and Cardiology notes reviewed     TAVR evaluation Assessment:     Herlinda Ash 122: I    Assessment:   Aortic stenosis, Non-Rheumatic  S/P transfemoral transcatheter aortic valve replacement    Wheeler Billing is making good progress in their post-op recovery  They are at NYHA functional class I  Left groin incision is well healed  Weight and VS are stable  Recent echocardiogram demonstrates normally functioning TAVR bioprosthesis   ECG, BMP & CBC stable  Plan:   Medications reviewed with patient  Patient was on Plavix prior to TAVR and therefore he will remain it per the prescribing provider  Aspirin 81 mg daily is lifelong  Benefits of participating in cardiac rehabilitation discussed with patient and they are cleared to proceed with the program    May resume driving and all normal activities  Wheeler Billing will return for one year follow-up in our office with repeat echocardiogram, ECG, CBC & BMP  Our office will contact patient to schedule appointment  They have been advised to maintain routine follow up with their cardiologist and PCP for ongoing medical care  Patient was comfortable with our recommendations and their questions were answered to their satisfaction  Routine referral to gastroenterology for colonoscopy screening was not indicated, as the patient is over 74years old    Rola Perez  5/11/22  1:44 PM  Attestation signed by Taryn Wheat DO at 5/11/2022  2:51 PM:  The patient was seen and examined, and I agree with the midlevel's history, physical exam, assessment and plan with the following additions:    Mr Adia Nielsen was seen in the office today after his TAVR  His postoperative echo was reviewed by myself personally and findings shared with him  This demonstrates a well-functioning TAVR no significant perivalvular regurgitation      I recommended outpatient cardiac rehab and return with a 1 year echocardiogram

## 2022-05-23 ENCOUNTER — RA CDI HCC (OUTPATIENT)
Dept: OTHER | Facility: HOSPITAL | Age: 77
End: 2022-05-23

## 2022-05-23 NOTE — PROGRESS NOTES
[facility] 73  coding opportunities     I11 0     Chart Reviewed number of suggestions sent to Provider: 1     Patients Insurance     Medicare Insurance: [employer]

## 2022-05-27 ENCOUNTER — OFFICE VISIT (OUTPATIENT)
Dept: INTERNAL MEDICINE CLINIC | Facility: CLINIC | Age: 77
End: 2022-05-27
Payer: COMMERCIAL

## 2022-05-27 VITALS
OXYGEN SATURATION: 98 % | WEIGHT: 223 LBS | HEART RATE: 63 BPM | BODY MASS INDEX: 31.92 KG/M2 | RESPIRATION RATE: 20 BRPM | SYSTOLIC BLOOD PRESSURE: 134 MMHG | HEIGHT: 70 IN | DIASTOLIC BLOOD PRESSURE: 74 MMHG | TEMPERATURE: 98.6 F

## 2022-05-27 DIAGNOSIS — R73.03 PREDIABETES: ICD-10-CM

## 2022-05-27 DIAGNOSIS — Z12.5 PROSTATE CANCER SCREENING: ICD-10-CM

## 2022-05-27 DIAGNOSIS — E55.9 VITAMIN D DEFICIENCY: ICD-10-CM

## 2022-05-27 DIAGNOSIS — R73.09 ELEVATED GLUCOSE: ICD-10-CM

## 2022-05-27 DIAGNOSIS — Z95.2 S/P TAVR (TRANSCATHETER AORTIC VALVE REPLACEMENT): ICD-10-CM

## 2022-05-27 DIAGNOSIS — E78.2 MIXED HYPERLIPIDEMIA: ICD-10-CM

## 2022-05-27 DIAGNOSIS — Z11.59 NEED FOR HEPATITIS C SCREENING TEST: ICD-10-CM

## 2022-05-27 DIAGNOSIS — R53.83 FATIGUE, UNSPECIFIED TYPE: Primary | ICD-10-CM

## 2022-05-27 DIAGNOSIS — I10 BENIGN ESSENTIAL HYPERTENSION: ICD-10-CM

## 2022-05-27 DIAGNOSIS — I10 PRIMARY HYPERTENSION: ICD-10-CM

## 2022-05-27 PROBLEM — N18.30 STAGE 3 CHRONIC KIDNEY DISEASE, UNSPECIFIED WHETHER STAGE 3A OR 3B CKD (HCC): Status: ACTIVE | Noted: 2022-05-27

## 2022-05-27 PROBLEM — D69.6 PLATELETS DECREASED (HCC): Status: ACTIVE | Noted: 2022-05-27

## 2022-05-27 PROCEDURE — 1160F RVW MEDS BY RX/DR IN RCRD: CPT

## 2022-05-27 PROCEDURE — 3725F SCREEN DEPRESSION PERFORMED: CPT

## 2022-05-27 PROCEDURE — 1036F TOBACCO NON-USER: CPT

## 2022-05-27 PROCEDURE — 3075F SYST BP GE 130 - 139MM HG: CPT

## 2022-05-27 PROCEDURE — 3078F DIAST BP <80 MM HG: CPT

## 2022-05-27 PROCEDURE — 99214 OFFICE O/P EST MOD 30 MIN: CPT

## 2022-05-27 NOTE — PATIENT INSTRUCTIONS
Wellness Visit for Adults   AMBULATORY CARE:   A wellness visit  is when you see your healthcare provider to get screened for health problems  Your healthcare provider will also give you advice on how to stay healthy  Write down your questions so you remember to ask them  Ask your healthcare provider how often you should have a wellness visit  What happens at a wellness visit:  Your healthcare provider will ask about your health, and your family history of health problems  This includes high blood pressure, heart disease, and cancer  He or she will ask if you have symptoms that concern you, if you smoke, and about your mood  You may also be asked about your intake of medicines, supplements, food, and alcohol  Any of the following may be done: Your weight  will be checked  Your height may also be checked so your body mass index (BMI) can be calculated  Your BMI shows if you are at a healthy weight  Your blood pressure  and heart rate will be checked  Your temperature may also be checked  Blood and urine tests  may be done  Blood tests may be done to check your cholesterol levels  Abnormal cholesterol levels increase your risk for heart disease and stroke  You may also need a blood or urine test to check for diabetes if you are at increased risk  Urine tests may be done to look for signs of an infection or kidney disease  A physical exam  includes checking your heartbeat and lungs with a stethoscope  Your healthcare provider may also check your skin to look for sun damage  Screening tests  may be recommended  A screening test is done to check for diseases that may not cause symptoms  The screening tests you may need depend on your age, gender, family history, and lifestyle habits  For example, colorectal screening may be recommended if you are 48years old or older  Screening tests you need if you are a woman:   A Pap smear  is used to screen for cervical cancer   Pap smears are usually done every 3 to 5 years depending on your age  You may need them more often if you have had abnormal Pap smear test results in the past  Ask your healthcare provider how often you should have a Pap smear  A mammogram  is an x-ray of your breasts to screen for breast cancer  Experts recommend mammograms every 2 years starting at age 48 years  You may need a mammogram at age 52 years or younger if you have an increased risk for breast cancer  Talk to your healthcare provider about when you should start having mammograms and how often you need them  Vaccines you may need:   Get an influenza vaccine  every year  The influenza vaccine protects you from the flu  Several types of viruses cause the flu  The viruses change over time, so new vaccines are made each year  Get a tetanus-diphtheria (Td) booster vaccine  every 10 years  This vaccine protects you against tetanus and diphtheria  Tetanus is a severe infection that may cause painful muscle spasms and lockjaw  Diphtheria is a severe bacterial infection that causes a thick covering in the back of your mouth and throat  Get a human papillomavirus (HPV) vaccine  if you are female and aged 23 to 32 or male 23 to 24 and never received it  This vaccine protects you from HPV infection  HPV is the most common infection spread by sexual contact  HPV may also cause vaginal, penile, and anal cancers  Get a pneumococcal vaccine  if you are aged 72 years or older  The pneumococcal vaccine is an injection given to protect you from pneumococcal disease  Pneumococcal disease is an infection caused by pneumococcal bacteria  The infection may cause pneumonia, meningitis, or an ear infection  Get a shingles vaccine  if you are 60 or older, even if you have had shingles before  The shingles vaccine is an injection to protect you from the varicella-zoster virus  This is the same virus that causes chickenpox   Shingles is a painful rash that develops in people who had chickenpox or have been exposed to the virus  How to eat healthy:  My Plate is a model for planning healthy meals  It shows the types and amounts of foods that should go on your plate  Fruits and vegetables make up about half of your plate, and grains and protein make up the other half  A serving of dairy is included on the side of your plate  The amount of calories and serving sizes you need depends on your age, gender, weight, and height  Examples of healthy foods are listed below:  Eat a variety of vegetables  such as dark green, red, and orange vegetables  You can also include canned vegetables low in sodium (salt) and frozen vegetables without added butter or sauces  Eat a variety of fresh fruits , canned fruit in 100% juice, frozen fruit, and dried fruit  Include whole grains  At least half of the grains you eat should be whole grains  Examples include whole-wheat bread, wheat pasta, brown rice, and whole-grain cereals such as oatmeal     Eat a variety of protein foods such as seafood (fish and shellfish), lean meat, and poultry without skin (turkey and chicken)  Examples of lean meats include pork leg, shoulder, or tenderloin, and beef round, sirloin, tenderloin, and extra lean ground beef  Other protein foods include eggs and egg substitutes, beans, peas, soy products, nuts, and seeds  Choose low-fat dairy products such as skim or 1% milk or low-fat yogurt, cheese, and cottage cheese  Limit unhealthy fats  such as butter, hard margarine, and shortening  Exercise:  Exercise at least 30 minutes per day on most days of the week  Some examples of exercise include walking, biking, dancing, and swimming  You can also fit in more physical activity by taking the stairs instead of the elevator or parking farther away from stores  Include muscle strengthening activities 2 days each week  Regular exercise provides many health benefits   It helps you manage your weight, and decreases your risk for type 2 diabetes, heart disease, stroke, and high blood pressure  Exercise can also help improve your mood  Ask your healthcare provider about the best exercise plan for you  General health and safety guidelines:   Do not smoke  Nicotine and other chemicals in cigarettes and cigars can cause lung damage  Ask your healthcare provider for information if you currently smoke and need help to quit  E-cigarettes or smokeless tobacco still contain nicotine  Talk to your healthcare provider before you use these products  Limit alcohol  A drink of alcohol is 12 ounces of beer, 5 ounces of wine, or 1½ ounces of liquor  Lose weight, if needed  Being overweight increases your risk of certain health conditions  These include heart disease, high blood pressure, type 2 diabetes, and certain types of cancer  Protect your skin  Do not sunbathe or use tanning beds  Use sunscreen with a SPF 15 or higher  Apply sunscreen at least 15 minutes before you go outside  Reapply sunscreen every 2 hours  Wear protective clothing, hats, and sunglasses when you are outside  Drive safely  Always wear your seatbelt  Make sure everyone in your car wears a seatbelt  A seatbelt can save your life if you are in an accident  Do not use your cell phone when you are driving  This could distract you and cause an accident  Pull over if you need to make a call or send a text message  Practice safe sex  Use latex condoms if are sexually active and have more than one partner  Your healthcare provider may recommend screening tests for sexually transmitted infections (STIs)  Wear helmets, lifejackets, and protective gear  Always wear a helmet when you ride a bike or motorcycle, go skiing, or play sports that could cause a head injury  Wear protective equipment when you play sports  Wear a lifejacket when you are on a boat or doing water sports      © Copyright [employer] 2022 Information is for End User's use only and may not be sold, redistributed or otherwise used for commercial purposes  All illustrations and images included in CareNotes® are the copyrighted property of A D A M , Inc  or Beverley [employer]  The above information is an  only  It is not intended as medical advice for individual conditions or treatments  Talk to your doctor, nurse or pharmacist before following any medical regimen to see if it is safe and effective for you

## 2022-05-27 NOTE — PROGRESS NOTES
[facility] - [facility] [address] [place] [address]    NAME: Eufemia Winston  AGE: 64 y o  SEX: male  : 1945     DATE: 2022     Assessment and Plan:     Problem List Items Addressed This Visit        Cardiovascular and Mediastinum    Hypertension     Blood pressure is 134/74 and stable continue current medications              Other    Hyperlipidemia (Chronic)    Relevant Orders    Lipid Panel with Direct LDL reflex    S/P TAVR (transcatheter aortic valve replacement)     Status post TAVR in April  Reports to be doing well  Denies chest pain or shortness of breath  Postoperative bradycardia  Patient following with Cardiology  Most recent EKG and echo stable  Other Visit Diagnoses     Fatigue, unspecified type    -  Primary    Will repeat CBC, vitamin-D level, TSH    Relevant Orders    CBC and differential    TSH, 3rd generation with Free T4 reflex    Vitamin D 25 hydroxy    Need for hepatitis C screening test        Relevant Orders    Hepatitis C Antibody ([facility])    Benign essential hypertension        Relevant Orders    CBC and differential    Comprehensive metabolic panel    Prediabetes        Elevated glucose        Relevant Orders    Hemoglobin A1C    Vitamin D deficiency        Relevant Orders    Vitamin D 25 hydroxy    Prostate cancer screening        Relevant Orders    PSA, Total Screen              No follow-ups on file  Chief Complaint:     Chief Complaint   Patient presents with    Transition of Care Management     Post valve work during stay in hospital, feeling a lack of energy , still has appetite, wants to go over the labs they benjamin in the hospital        History of Present Illness:     Paula Daniel presents to the office today for follow-up appointment  He underwent a TAVR in April of this year  Reports fatigue  Otherwise no new complaints  Since discharge patient has had a follow-up with Cardiology as well as Cardiothoracic surgery    Recent EKG and echo are stable, patient has been walking daily at least 1 mi, blood pressure has been stable  He denies chest pain or shortness of breath  Review of Systems:     Review of Systems   Constitutional: Positive for fatigue  Negative for chills and fever  HENT: Negative  Eyes: Negative  Respiratory: Negative  Negative for shortness of breath  Cardiovascular: Negative  Negative for chest pain and palpitations  Gastrointestinal: Negative  Endocrine: Negative  Genitourinary: Negative  Musculoskeletal: Negative  Skin: Negative  Allergic/Immunologic: Negative  Neurological: Negative  Hematological: Negative  Psychiatric/Behavioral: Negative  Negative for sleep disturbance  Problem List:     Patient Active Problem List   Diagnosis    Allergic rhinitis    Severe aortic stenosis    CAD (coronary artery disease)    Hypertension    BPH with obstruction/lower urinary tract symptoms    Gait instability    Hyperlipidemia    Insomnia    Male erectile dysfunction    Pulmonary nodule seen on imaging study    History of stroke    S/P TAVR (transcatheter aortic valve replacement)    Former tobacco use    CHF [facility]congestive heart failure) ([facility]    Hyperchloremia    AVB (atrioventricular block)    Platelets decreased ([facility]    Stage 3 chronic kidney disease, unspecified whether stage 3a or 3b CKD ([facility])        Objective:     /74 (BP Location: Left arm, Patient Position: Sitting, Cuff Size: Standard)   Pulse 63   Temp 98 6 °F (37 °C) (Tympanic)   Resp 20   Ht 5' 10" (1 778 m)   Wt 101 kg (223 lb)   SpO2 98%   BMI 32 00 kg/m²     Physical Exam  Vitals and nursing note reviewed  Constitutional:       General: He is not in acute distress  Appearance: He is well-developed  He is obese  HENT:      Head: Normocephalic and atraumatic  Right Ear: Tympanic membrane normal       Left Ear: Tympanic membrane normal       Nose: Nose normal  No congestion  Mouth/Throat:      Pharynx: Oropharynx is clear  Eyes:      Conjunctiva/sclera: Conjunctivae normal    Neck:      Thyroid: No thyromegaly  Vascular: No carotid bruit  Cardiovascular:      Rate and Rhythm: Normal rate and regular rhythm  Pulses: Normal pulses  Heart sounds: Normal heart sounds  No murmur heard  Pulmonary:      Effort: Pulmonary effort is normal  No respiratory distress  Breath sounds: Normal breath sounds  Abdominal:      General: Bowel sounds are normal       Palpations: Abdomen is soft  Tenderness: There is no abdominal tenderness  Musculoskeletal:         General: Normal range of motion  Cervical back: Normal range of motion and neck supple  Right lower leg: No edema  Left lower leg: No edema  Lymphadenopathy:      Cervical: No cervical adenopathy  Skin:     General: Skin is warm and dry  Capillary Refill: Capillary refill takes less than 2 seconds  Neurological:      General: No focal deficit present  Mental Status: He is alert and oriented to person, place, and time  Psychiatric:         Mood and Affect: Mood normal          Behavior: Behavior normal          Thought Content: Thought content normal          Judgment: Judgment normal          I spent 20 minutes with this patient      [address] [place] [address]  [facility]

## 2022-05-31 NOTE — ASSESSMENT & PLAN NOTE
Status post TAVR in April  Reports to be doing well  Denies chest pain or shortness of breath  Postoperative bradycardia  Patient following with Cardiology  Most recent EKG and echo stable

## 2022-06-11 LAB
25(OH)D3+25(OH)D2 SERPL-MCNC: 70.2 NG/ML (ref 30–100)
ALBUMIN SERPL-MCNC: 4.3 G/DL (ref 3.7–4.7)
ALBUMIN/GLOB SERPL: 1.6 {RATIO} (ref 1.2–2.2)
ALP SERPL-CCNC: 99 IU/L (ref 44–121)
ALT SERPL-CCNC: 23 IU/L (ref 0–44)
AST SERPL-CCNC: 27 IU/L (ref 0–40)
BASOPHILS # BLD AUTO: 0 X10E3/UL (ref 0–0.2)
BASOPHILS NFR BLD AUTO: 1 %
BILIRUB SERPL-MCNC: 1 MG/DL (ref 0–1.2)
BUN SERPL-MCNC: 25 MG/DL (ref 8–27)
BUN/CREAT SERPL: 21 (ref 10–24)
CALCIUM SERPL-MCNC: 9.3 MG/DL (ref 8.6–10.2)
CHLORIDE SERPL-SCNC: 103 MMOL/L (ref 96–106)
CHOLEST SERPL-MCNC: 129 MG/DL (ref 100–199)
CO2 SERPL-SCNC: 26 MMOL/L (ref 20–29)
CREAT SERPL-MCNC: 1.17 MG/DL (ref 0.76–1.27)
EGFR: 64 ML/MIN/1.73
EOSINOPHIL # BLD AUTO: 0.1 X10E3/UL (ref 0–0.4)
EOSINOPHIL NFR BLD AUTO: 1 %
ERYTHROCYTE [DISTWIDTH] IN BLOOD BY AUTOMATED COUNT: 12.1 % (ref 11.6–15.4)
GLOBULIN SER-MCNC: 2.7 G/DL (ref 1.5–4.5)
GLUCOSE SERPL-MCNC: 107 MG/DL (ref 65–99)
HBA1C MFR BLD: 4.8 % (ref 4.8–5.6)
HCT VFR BLD AUTO: 40.6 % (ref 37.5–51)
HCV AB S/CO SERPL IA: <0.1 S/CO RATIO (ref 0–0.9)
HDLC SERPL-MCNC: 35 MG/DL
HGB BLD-MCNC: 13.8 G/DL (ref 13–17.7)
IMM GRANULOCYTES # BLD: 0 X10E3/UL (ref 0–0.1)
IMM GRANULOCYTES NFR BLD: 0 %
LDLC SERPL CALC-MCNC: 71 MG/DL (ref 0–99)
LYMPHOCYTES # BLD AUTO: 1.2 X10E3/UL (ref 0.7–3.1)
LYMPHOCYTES NFR BLD AUTO: 19 %
MCH RBC QN AUTO: 32.7 PG (ref 26.6–33)
MCHC RBC AUTO-ENTMCNC: 34 G/DL (ref 31.5–35.7)
MCV RBC AUTO: 96 FL (ref 79–97)
MONOCYTES # BLD AUTO: 0.6 X10E3/UL (ref 0.1–0.9)
MONOCYTES NFR BLD AUTO: 10 %
NEUTROPHILS # BLD AUTO: 4.5 X10E3/UL (ref 1.4–7)
NEUTROPHILS NFR BLD AUTO: 69 %
PLATELET # BLD AUTO: 178 X10E3/UL (ref 150–450)
POTASSIUM SERPL-SCNC: 4.2 MMOL/L (ref 3.5–5.2)
PROT SERPL-MCNC: 7 G/DL (ref 6–8.5)
PSA SERPL-MCNC: 0.3 NG/ML (ref 0–4)
RBC # BLD AUTO: 4.22 X10E6/UL (ref 4.14–5.8)
SL AMB INTERPRETATION: NORMAL
SODIUM SERPL-SCNC: 144 MMOL/L (ref 134–144)
TRIGL SERPL-MCNC: 127 MG/DL (ref 0–149)
TSH SERPL DL<=0.005 MIU/L-ACNC: 3.22 UIU/ML (ref 0.45–4.5)
WBC # BLD AUTO: 6.5 X10E3/UL (ref 3.4–10.8)

## 2022-06-14 NOTE — RESULT ENCOUNTER NOTE
Overall lab work looks good    Glucose is just a little elevated however his hemoglobin A1c is normal   HDL is a little low and his lipid panel could improved by using healthy fats such as olive oil, avocado, mixed nuts, fatty fish such as salmon

## 2022-06-22 DIAGNOSIS — N40.1 BPH WITH OBSTRUCTION/LOWER URINARY TRACT SYMPTOMS: Chronic | ICD-10-CM

## 2022-06-22 DIAGNOSIS — I10 BENIGN ESSENTIAL HYPERTENSION: ICD-10-CM

## 2022-06-22 DIAGNOSIS — E78.2 MIXED HYPERLIPIDEMIA: ICD-10-CM

## 2022-06-22 DIAGNOSIS — N13.8 BPH WITH OBSTRUCTION/LOWER URINARY TRACT SYMPTOMS: Chronic | ICD-10-CM

## 2022-06-22 DIAGNOSIS — I25.10 CORONARY ARTERY DISEASE INVOLVING NATIVE HEART WITHOUT ANGINA PECTORIS, UNSPECIFIED VESSEL OR LESION TYPE: ICD-10-CM

## 2022-06-22 RX ORDER — ATORVASTATIN CALCIUM 40 MG/1
TABLET, FILM COATED ORAL
Qty: 90 TABLET | Refills: 2 | Status: SHIPPED | OUTPATIENT
Start: 2022-06-22

## 2022-06-22 RX ORDER — AMLODIPINE BESYLATE 5 MG/1
TABLET ORAL
Qty: 90 TABLET | Refills: 2 | Status: SHIPPED | OUTPATIENT
Start: 2022-06-22

## 2022-06-22 RX ORDER — CARVEDILOL 6.25 MG/1
6.25 TABLET ORAL 2 TIMES DAILY WITH MEALS
Qty: 60 TABLET | Refills: 0
Start: 2022-06-22 | End: 2022-07-11 | Stop reason: SDUPTHER

## 2022-06-22 RX ORDER — FINASTERIDE 5 MG/1
TABLET, FILM COATED ORAL
Qty: 90 TABLET | Refills: 2 | Status: SHIPPED | OUTPATIENT
Start: 2022-06-22

## 2022-06-22 RX ORDER — OLMESARTAN MEDOXOMIL AND HYDROCHLOROTHIAZIDE 40/25 40; 25 MG/1; MG/1
TABLET ORAL
Qty: 90 TABLET | Refills: 2 | Status: SHIPPED | OUTPATIENT
Start: 2022-06-22

## 2022-06-22 NOTE — TELEPHONE ENCOUNTER
Medication Refill Request     Name carvedilol (COREG) 6 25 mg tablet  Dose/Frequency Take 1 tablet (6 25 mg total) by mouth 2 (two) times a day with meals  Quantity 180  Verified pharmacy   [x]  Verified ordering Provider   [x]  Verified enough for 3 days  [x]

## 2022-07-11 DIAGNOSIS — I25.10 CORONARY ARTERY DISEASE INVOLVING NATIVE HEART WITHOUT ANGINA PECTORIS, UNSPECIFIED VESSEL OR LESION TYPE: ICD-10-CM

## 2022-07-11 RX ORDER — CARVEDILOL 6.25 MG/1
6.25 TABLET ORAL 2 TIMES DAILY WITH MEALS
Qty: 60 TABLET | Refills: 0
Start: 2022-07-11 | End: 2022-07-20 | Stop reason: SDUPTHER

## 2022-07-20 DIAGNOSIS — I25.10 CORONARY ARTERY DISEASE INVOLVING NATIVE HEART WITHOUT ANGINA PECTORIS, UNSPECIFIED VESSEL OR LESION TYPE: ICD-10-CM

## 2022-07-20 RX ORDER — CARVEDILOL 6.25 MG/1
6.25 TABLET ORAL 2 TIMES DAILY WITH MEALS
Qty: 180 TABLET | Refills: 1 | Status: SHIPPED | OUTPATIENT
Start: 2022-07-20 | End: 2023-01-16

## 2022-07-20 NOTE — TELEPHONE ENCOUNTER
Medication Refill Request     Name Carvedilol   Dose/Frequency 6 25 mg po twice a day with meals   Quantity 90  Verified pharmacy   [x]  Verified ordering Provider   [x]  Does patient have enough for the next 3 days?  Yes [x] No []

## 2022-09-19 ENCOUNTER — RA CDI HCC (OUTPATIENT)
Dept: OTHER | Facility: HOSPITAL | Age: 77
End: 2022-09-19

## 2022-09-19 NOTE — PROGRESS NOTES
[facility]  coding opportunities     I11 [id number]     Chart Reviewed number of suggestions sent to Provider: 1     Patients Insurance     Medicare Insurance: [facility] [address]

## 2022-09-20 ENCOUNTER — OFFICE VISIT (OUTPATIENT)
Dept: INTERNAL MEDICINE CLINIC | Facility: CLINIC | Age: 77
End: 2022-09-20
Payer: COMMERCIAL

## 2022-09-20 VITALS
WEIGHT: 225 LBS | RESPIRATION RATE: 14 BRPM | TEMPERATURE: 96.2 F | HEIGHT: 70 IN | DIASTOLIC BLOOD PRESSURE: 70 MMHG | OXYGEN SATURATION: 98 % | BODY MASS INDEX: 32.21 KG/M2 | HEART RATE: 58 BPM | SYSTOLIC BLOOD PRESSURE: 136 MMHG

## 2022-09-20 DIAGNOSIS — Z23 ENCOUNTER FOR IMMUNIZATION: ICD-10-CM

## 2022-09-20 DIAGNOSIS — I50.32 HYPERTENSIVE HEART DISEASE WITH CHRONIC DIASTOLIC CONGESTIVE HEART FAILURE (HCC): Primary | Chronic | ICD-10-CM

## 2022-09-20 DIAGNOSIS — R53.82 CHRONIC FATIGUE: ICD-10-CM

## 2022-09-20 DIAGNOSIS — I11.0 HYPERTENSIVE HEART DISEASE WITH CHRONIC DIASTOLIC CONGESTIVE HEART FAILURE (HCC): Primary | Chronic | ICD-10-CM

## 2022-09-20 DIAGNOSIS — Z13.39 SCREENING FOR ALCOHOL PROBLEM: ICD-10-CM

## 2022-09-20 DIAGNOSIS — Z00.00 MEDICARE ANNUAL WELLNESS VISIT, SUBSEQUENT: ICD-10-CM

## 2022-09-20 DIAGNOSIS — E78.2 MIXED HYPERLIPIDEMIA: Chronic | ICD-10-CM

## 2022-09-20 PROBLEM — D69.6 PLATELETS DECREASED (HCC): Status: RESOLVED | Noted: 2022-05-27 | Resolved: 2022-09-20

## 2022-09-20 PROBLEM — E87.8 HYPERCHLOREMIA: Status: RESOLVED | Noted: 2022-04-12 | Resolved: 2022-09-20

## 2022-09-20 PROBLEM — Z95.2 S/P TAVR (TRANSCATHETER AORTIC VALVE REPLACEMENT): Chronic | Status: ACTIVE | Noted: 2022-04-12

## 2022-09-20 PROCEDURE — 99214 OFFICE O/P EST MOD 30 MIN: CPT | Performed by: INTERNAL MEDICINE

## 2022-09-20 PROCEDURE — 3078F DIAST BP <80 MM HG: CPT | Performed by: INTERNAL MEDICINE

## 2022-09-20 PROCEDURE — 1125F AMNT PAIN NOTED PAIN PRSNT: CPT | Performed by: INTERNAL MEDICINE

## 2022-09-20 PROCEDURE — 3075F SYST BP GE 130 - 139MM HG: CPT | Performed by: INTERNAL MEDICINE

## 2022-09-20 PROCEDURE — 1100F PTFALLS ASSESS-DOCD GE2>/YR: CPT | Performed by: INTERNAL MEDICINE

## 2022-09-20 PROCEDURE — 3288F FALL RISK ASSESSMENT DOCD: CPT | Performed by: INTERNAL MEDICINE

## 2022-09-20 PROCEDURE — 1160F RVW MEDS BY RX/DR IN RCRD: CPT | Performed by: INTERNAL MEDICINE

## 2022-09-20 PROCEDURE — 3725F SCREEN DEPRESSION PERFORMED: CPT | Performed by: INTERNAL MEDICINE

## 2022-09-20 PROCEDURE — G0439 PPPS, SUBSEQ VISIT: HCPCS | Performed by: INTERNAL MEDICINE

## 2022-09-20 PROCEDURE — 90662 IIV NO PRSV INCREASED AG IM: CPT | Performed by: INTERNAL MEDICINE

## 2022-09-20 PROCEDURE — G0444 DEPRESSION SCREEN ANNUAL: HCPCS | Performed by: INTERNAL MEDICINE

## 2022-09-20 PROCEDURE — G0008 ADMIN INFLUENZA VIRUS VAC: HCPCS | Performed by: INTERNAL MEDICINE

## 2022-09-20 PROCEDURE — G0442 ANNUAL ALCOHOL SCREEN 15 MIN: HCPCS | Performed by: INTERNAL MEDICINE

## 2022-09-20 PROCEDURE — 1170F FXNL STATUS ASSESSED: CPT | Performed by: INTERNAL MEDICINE

## 2022-09-20 NOTE — PROGRESS NOTES
Assessment and Plan:     1  Hypertensive heart disease with chronic diastolic congestive heart failure ([address] )    He appears euvolemic  BP is acceptable in office today  Weight loss advised  Discussed increasing his cardiovascular exercise slowly over time  He would benefit from cardiac rehab to work on strength and conditioning and endurance  Monitor weight at home  Heart healthy diet  No changes to medication today  2  Mixed hyperlipidemia    Cholesterol is well controlled  Continue high intensity statin  3  Chronic fatigue    Discussed the likely reasons for his fatigue, but will get some repeat/additional lab work and call him with results  - Lyme Total Antibody Profile with reflex to WB; Future  - CBC; Future  - Basic metabolic panel; Future    4  Medicare annual wellness visit, subsequent    5  Encounter for immunization  - influenza vaccine, high-dose, PF 0 7 mL (FLUZONE HIGH-DOSE)     BMI Counseling: Body mass index is 32 28 kg/m²  The BMI is above normal  Nutrition recommendations include limiting drinks that contain sugar, moderation in carbohydrate intake and increasing intake of lean protein  Exercise recommendations include exercising 3-5 times per week  Rationale for BMI follow-up plan is due to patient being overweight or obese  Depression Screening and Follow-up Plan: Patient was screened for depression during today's encounter  They screened negative with a PHQ-2 score of 0  Falls Plan of Care: balance, strength, and gait training instructions were provided  Preventive health issues were discussed with patient, and age appropriate screening tests were ordered as noted in patient's After Visit Summary  Personalized health advice and appropriate referrals for health education or preventive services given if needed, as noted in patient's After Visit Summary  History of Present Illness:     Patient presents for a Medicare Wellness Visit    Got TAVR surgery since I last saw him  He thought it might help his energy level but he can't say he has noticed a difference  Had labs in June which showed no metabolic reason for fatigue  He isn't the most active person at home  He had declined cardiac rehab  Patient Care Team:  Meredith Camargo DO as PCP - General (Internal Medicine)  Meredith Camargo DO as PCP - [address] [place], [place] [id number] [facility] ([facility])  Meredith Camargo DO as PCP [facility]  Espinoza Suarez MD (Cardiology)  Kasey Cohen MD (Neurology)     Review of Systems:     Review of Systems   Constitutional: Positive for fatigue  Negative for activity change and appetite change  Respiratory: Negative for apnea, cough, chest tightness, shortness of breath and wheezing  Cardiovascular: Negative for chest pain, palpitations and leg swelling  Gastrointestinal: Negative for abdominal distention, abdominal pain, blood in stool, constipation, diarrhea, nausea and vomiting  Neurological: Negative for dizziness, weakness, light-headedness, numbness and headaches  Psychiatric/Behavioral: Negative for behavioral problems, confusion, hallucinations, sleep disturbance and suicidal ideas  The patient is not nervous/anxious         Problem List:     Patient Active Problem List   Diagnosis    Allergic rhinitis    CAD (coronary artery disease)    Hypertension    BPH with obstruction/lower urinary tract symptoms    Gait instability    Hyperlipidemia    Insomnia    Male erectile dysfunction    Pulmonary nodule seen on imaging study    History of stroke    S/P TAVR (transcatheter aortic valve replacement)    Former tobacco use    Chronic diastolic congestive heart failure (HCC)    AVB (atrioventricular block)    Hypertensive heart disease with chronic diastolic congestive heart failure ([facility] [address] )      Past Medical and Surgical History:     Past Medical History:   Diagnosis Date    Arthritis     AVB (atrioventricular block)     1st degree    BPH (benign prostatic hyperplasia)     CAD (coronary artery disease)     s/p CABGx2    CHF (congestive heart failure) (HCC)     Chronic rhinitis     Former tobacco use     Gait instability     History of CVA (cerebrovascular accident)     w/ residual left-sided weakness    Hyperlipidemia     Hypertension     Insomnia     Obesity     BMI 32 82    Pulmonary nodule     2cm    Severe aortic stenosis     Vitamin D deficiency      Past Surgical History:   Procedure Laterality Date    CARDIAC CATHETERIZATION N/A 2022    Procedure: CARDIAC TAVR;  Surgeon: Agnieszka Vera DO;  [facility];  Service: Cardiology    CORONARY ARTERY BYPASS GRAFT  2004    x2 LIMA-LAD, SVG-RCA    AK REPLACE AORTIC VALVE OPENFEMORAL ARTERY APPROACH N/A 2022    Procedure: REPLACEMENT AORTIC VALVE TRANSCATHETER (TAVR) TRANSFEMORAL W/ 26MM LOPEZ CHUCK S3 ULTRA VALVE(ACCESS ON LEFT) LORIE;  Surgeon: Lindsay Kessler DO;  Location[facility];  Service: Cardiac Surgery    TONSILLECTOMY        Family History:     Family History   Problem Relation Age of Onset    Coronary artery disease Mother     Arthritis Mother     Hypertension Mother     Stroke Mother     Other Father         aortic valve replacement; CABG    Arthritis Father     Hypertension Brother     Stroke Brother       Social History:     Social History     Socioeconomic History    Marital status: Single     Spouse name: None    Number of children: None    Years of education: None    Highest education level: None   Occupational History    None   Tobacco Use    Smoking status: Former Smoker     Packs/day: 1 50     Years: 4 00     Pack years: 6 00     Types: Cigarettes     Quit date: 1960     Years since quittin 7    Smokeless tobacco: Never Used    Tobacco comment: former light tobacco smoker   Vaping Use    Vaping Use: Never used   Substance and Sexual Activity    Alcohol use: Not Currently    Drug use: No    Sexual activity: Never   Other Topics Concern    None Social History Narrative    Caffeine use    No advance directives     Social Determinants of Health     Financial Resource Strain: Low Risk     Difficulty of Paying Living Expenses: Not hard at all   Food Insecurity: Not on file   Transportation Needs: No Transportation Needs    Lack of Transportation (Medical): No    Lack of Transportation (Non-Medical): No   Physical Activity: Not on file   Stress: Not on file   Social Connections: Not on file   Intimate Partner Violence: Not on file   Housing Stability: Not on file      Medications and Allergies:     Current Outpatient Medications   Medication Sig Dispense Refill    amLODIPine (NORVASC) 5 mg tablet TAKE ONE TABLET BY MOUTH EVERY DAY 90 tablet 2    aspirin (ECOTRIN LOW STRENGTH) 81 mg EC tablet Take 1 tablet by mouth daily      atorvastatin (LIPITOR) 40 mg tablet TAKE ONE TABLET BY MOUTH EVERY DAY 90 tablet 2    carvedilol (COREG) 6 25 mg tablet Take 1 tablet (6 25 mg total) by mouth 2 (two) times a day with meals 180 tablet 1    Cholecalciferol (VITAMIN D3) 5000 units CAPS Take by mouth daily       finasteride (PROSCAR) 5 mg tablet TAKE ONE TABLET BY MOUTH EVERY DAY 90 tablet 2    mirtazapine (REMERON) 15 mg tablet TAKE ONE TABLET BY MOUTH EVERY DAY 90 tablet 2    olmesartan-hydrochlorothiazide (BENICAR HCT) 40-25 MG per tablet TAKE ONE TABLET BY MOUTH EVERY DAY 90 tablet 2    clopidogrel (PLAVIX) 75 mg tablet Take 1 tablet (75 mg total) by mouth daily 90 tablet 3     No current facility-administered medications for this visit       No Known Allergies   Immunizations:     Immunization History   Administered Date(s) Administered    COVID-19 MODERNA VACC 0 25 ML IM BOOSTER 11/17/2021    COVID-19 MODERNA VACC 0 5 ML IM 02/09/2021, 03/09/2021    INFLUENZA 09/19/2014, 10/04/2017, 10/08/2018, 09/09/2020, 09/22/2021    Influenza, injectable, quadrivalent, preservative free 0 5 mL 10/07/2019    Influenza, seasonal, injectable 01/01/2014, 09/20/2016    Pneumococcal Conjugate 13-Valent 08/09/2019    Pneumococcal Polysaccharide PPV23 10/02/2014    Tdap 1943, 03/21/2016    Zoster 1945      Health Maintenance:         Topic Date Due    Hepatitis C Screening  Completed         Topic Date Due    COVID-19 Vaccine (4 - Booster for Moderna series) 03/17/2022    Influenza Vaccine (1) 09/01/2022      Medicare Screening Tests and Risk Assessments:     Lidia Abbott is here for his Subsequent Wellness visit  Last Medicare Wellness visit information reviewed, patient interviewed and updates made to the record today  Health Risk Assessment:   Patient rates overall health as good  Patient feels that their physical health rating is slightly worse  Patient is satisfied with their life  Eyesight was rated as slightly worse  Hearing was rated as slightly worse  Patient feels that their emotional and mental health rating is same  Patients states they are never, rarely angry  Patient states they are often unusually tired/fatigued  Pain experienced in the last 7 days has been none  Patient states that he has experienced no weight loss or gain in last 6 months  Depression Screening:   PHQ-2 Score: 0      Fall Risk Screening: In the past year, patient has experienced: history of falling in past year    Number of falls: 1  Injured during fall?: No    Feels unsteady when standing or walking?: Yes    Worried about falling?: Yes      Home Safety:  Patient does not have trouble with stairs inside or outside of their home  Patient has working smoke alarms and has no working carbon monoxide detector  Home safety hazards include: none  Nutrition:   Current diet is Unhealthy  Medications:   Patient is currently taking over-the-counter supplements  OTC medications include: see medication list  Patient is able to manage medications       Activities of Daily Living (ADLs)/Instrumental Activities of Daily Living (IADLs):   Walk and transfer into and out of bed and chair?: Yes  Dress and groom yourself?: Yes    Bathe or shower yourself?: Yes    Feed yourself? Yes  Do your laundry/housekeeping?: Yes  Manage your money, pay your bills and track your expenses?: Yes  Make your own meals?: Yes    Do your own shopping?: Yes    Durable Medical Equipment Suppliers  none    Previous Hospitalizations:   Any hospitalizations or ED visits within the last 12 months?: Yes    How many hospitalizations have you had in the last year?: 1-2    Advance Care Planning:   Living will: No    Durable POA for healthcare: No    Advanced directive: No    Five wishes given: Yes      Cognitive Screening:   Provider or family/friend/caregiver concerned regarding cognition?: No    PREVENTIVE SCREENINGS      Cardiovascular Screening:    General: Screening Not Indicated and History Lipid Disorder      Diabetes Screening:     General: Screening Current      Colorectal Cancer Screening:     General: Screening Current      Prostate Cancer Screening:    General: Screening Not Indicated      Osteoporosis Screening:    General: Screening Not Indicated      Abdominal Aortic Aneurysm (AAA) Screening:    Risk factors include: tobacco use        General: Screening Not Indicated      Lung Cancer Screening:     General: Screening Not Indicated      Hepatitis C Screening:    General: Screening Current    Screening, Brief Intervention, and Referral to Treatment (SBIRT)    Screening  Typical number of drinks in a day: 0  Typical number of drinks in a week: 0  Interpretation: Low risk drinking behavior      AUDIT-C Screenin) How often did you have a drink containing alcohol in the past year? never  2) How many drinks did you have on a typical day when you were drinking in the past year? 0  3) How often did you have 6 or more drinks on one occasion in the past year? never    AUDIT-C Score: 0  Interpretation: Score 0-3 (male): Negative screen for alcohol misuse    Single Item Drug Screening:  How often have you used an illegal drug (including marijuana) or a prescription medication for non-medical reasons in the past year? never    Single Item Drug Screen Score: 0  Interpretation: Negative screen for possible drug use disorder    Brief Intervention  Alcohol & drug use screenings were reviewed  No concerns regarding substance use disorder identified  Other Counseling Topics:   Car/seat belt/driving safety, skin self-exam, sunscreen and regular weightbearing exercise  Physical Exam:     /70 (BP Location: Left arm, Patient Position: Sitting, Cuff Size: Standard)   Pulse 58   Temp (!) 96 2 °F (35 7 °C) (Tympanic)   Resp (!) 60   Ht 5' 10" (1 778 m)   Wt 102 kg (225 lb)   SpO2 98%   BMI 32 28 kg/m²     Physical Exam  Constitutional:       General: He is not in acute distress  Appearance: He is obese  He is not ill-appearing  Cardiovascular:      Rate and Rhythm: Normal rate and regular rhythm  Heart sounds: No murmur heard  Pulmonary:      Effort: Pulmonary effort is normal  No respiratory distress  Breath sounds: No wheezing  Abdominal:      General: Bowel sounds are normal  There is no distension  Tenderness: There is no abdominal tenderness  Musculoskeletal:      Right lower leg: No edema  Left lower leg: No edema  Neurological:      Mental Status: He is alert            Maryjane Manriquez DO

## 2022-09-20 NOTE — PATIENT INSTRUCTIONS
Medicare Preventive Visit Patient Instructions  Thank you for completing your Welcome to Medicare Visit or Medicare Annual Wellness Visit today  Your next wellness visit will be due in one year (9/21/2023)  The screening/preventive services that you may require over the next 5-10 years are detailed below  Some tests may not apply to you based off risk factors and/or age  Screening tests ordered at today's visit but not completed yet may show as past due  Also, please note that scanned in results may not display below  Preventive Screenings:  Service Recommendations Previous Testing/Comments   Colorectal Cancer Screening  Colonoscopy    Fecal Occult Blood Test (FOBT)/Fecal Immunochemical Test (FIT)  Fecal DNA/Cologuard Test  Flexible Sigmoidoscopy Age: 39-70 years old   Colonoscopy: every 10 years (May be performed more frequently if at higher risk)  OR  FOBT/FIT: every 1 year  OR  Cologuard: every 3 years  OR  Sigmoidoscopy: every 5 years  Screening may be recommended earlier than age 39 if at higher risk for colorectal cancer  Also, an individualized decision between you and your healthcare provider will decide whether screening between the ages of 74-80 would be appropriate   Colonoscopy: Not on file  FOBT/FIT: Not on file  Cologuard: Not on file  Sigmoidoscopy: Not on file    Screening Current     Prostate Cancer Screening Individualized decision between patient and health care provider in men between ages of 53-78   Medicare will cover every 12 months beginning on the day after your 50th birthday PSA: 0 3 ng/mL     Screening Not Indicated     Hepatitis C Screening Once for adults born between 1945 and 1965  More frequently in patients at high risk for Hepatitis C Hep C Antibody: 06/10/2022    Screening Current   Diabetes Screening 1-2 times per year if you're at risk for diabetes or have pre-diabetes Fasting glucose: 121 mg/dL (5/11/2022)  A1C: 4 8 % (6/10/2022)  Screening Current   Cholesterol Screening Once every 5 years if you don't have a lipid disorder  May order more often based on risk factors  Lipid panel: 06/10/2022  Screening Not Indicated  History Lipid Disorder      Other Preventive Screenings Covered by Medicare:  Abdominal Aortic Aneurysm (AAA) Screening: covered once if your at risk  You're considered to be at risk if you have a family history of AAA or a male between the age of 73-68 who smoking at least 100 cigarettes in your lifetime  Lung Cancer Screening: covers low dose CT scan once per year if you meet all of the following conditions: (1) Age 50-69; (2) No signs or symptoms of lung cancer; (3) Current smoker or have quit smoking within the last 15 years; (4) You have a tobacco smoking history of at least 20 pack years (packs per day x number of years you smoked); (5) You get a written order from a healthcare provider  Glaucoma Screening: covered annually if you're considered high risk: (1) You have diabetes OR (2) Family history of glaucoma OR (3)  aged 48 and older OR (3)  American aged 72 and older  Osteoporosis Screening: covered every 2 years if you meet one of the following conditions: (1) Have a vertebral abnormality; (2) On glucocorticoid therapy for more than 3 months; (3) Have primary hyperparathyroidism; (4) On osteoporosis medications and need to assess response to drug therapy  HIV Screening: covered annually if you're between the age of 12-76  Also covered annually if you are younger than 13 and older than 72 with risk factors for HIV infection  For pregnant patients, it is covered up to 3 times per pregnancy      Immunizations:  Immunization Recommendations   Influenza Vaccine Annual influenza vaccination during flu season is recommended for all persons aged >= 6 months who do not have contraindications   Pneumococcal Vaccine   * Pneumococcal conjugate vaccine = PCV13 (Prevnar 13), PCV15 (Vaxneuvance), PCV20 (Prevnar 20)  * Pneumococcal polysaccharide vaccine = PPSV23 (Pneumovax) Adults 2364 years old: 1-3 doses may be recommended based on certain risk factors  Adults 72 years old: 1-2 doses may be recommended based off what pneumonia vaccine you previously received   Hepatitis B Vaccine 3 dose series if at intermediate or high risk (ex: diabetes, end stage renal disease, liver disease)   Tetanus (Td) Vaccine - COST NOT COVERED BY MEDICARE PART B Following completion of primary series, a booster dose should be given every 10 years to maintain immunity against tetanus  Td may also be given as tetanus wound prophylaxis  Tdap Vaccine - COST NOT COVERED BY MEDICARE PART B Recommended at least once for all adults  For pregnant patients, recommended with each pregnancy  Shingles Vaccine (Shingrix) - COST NOT COVERED BY MEDICARE PART B  2 shot series recommended in those aged 48 and above     Health Maintenance Due:      Topic Date Due    Hepatitis C Screening  Completed     Immunizations Due:      Topic Date Due    COVID-19 Vaccine (4 - Booster for Moderna series) 03/17/2022    Influenza Vaccine (1) 09/01/2022     Advance Directives   What are advance directives? Advance directives are legal documents that state your wishes and plans for medical care  These plans are made ahead of time in case you lose your ability to make decisions for yourself  Advance directives can apply to any medical decision, such as the treatments you want, and if you want to donate organs  What are the types of advance directives? There are many types of advance directives, and each state has rules about how to use them  You may choose a combination of any of the following:  Living will: This is a written record of the treatment you want  You can also choose which treatments you do not want, which to limit, and which to stop at a certain time  This includes surgery, medicine, IV fluid, and tube feedings  Durable power of  for healthcare [facility]):   This is a written record that states who you want to make healthcare choices for you when you are unable to make them for yourself  This person, called a proxy, is usually a family member or a friend  You may choose more than 1 proxy  Do not resuscitate (DNR) order:  A DNR order is used in case your heart stops beating or you stop breathing  It is a request not to have certain forms of treatment, such as CPR  A DNR order may be included in other types of advance directives  Medical directive: This covers the care that you want if you are in a coma, near death, or unable to make decisions for yourself  You can list the treatments you want for each condition  Treatment may include pain medicine, surgery, blood transfusions, dialysis, IV or tube feedings, and a ventilator (breathing machine)  Values history: This document has questions about your views, beliefs, and how you feel and think about life  This information can help others choose the care that you would choose  Why are advance directives important? An advance directive helps you control your care  Although spoken wishes may be used, it is better to have your wishes written down  Spoken wishes can be misunderstood, or not followed  Treatments may be given even if you do not want them  An advance directive may make it easier for your family to make difficult choices about your care  Fall Prevention    Fall prevention  includes ways to make your home and other areas safer  It also includes ways you can move more carefully to prevent a fall  Health conditions that cause changes in your blood pressure, vision, or muscle strength and coordination may increase your risk for falls  Medicines may also increase your risk for falls if they make you dizzy, weak, or sleepy  Fall prevention tips:   Stand or sit up slowly  Use assistive devices as directed  Wear shoes that fit well and have soles that   Wear a personal alarm  Stay active  Manage your medical conditions  Home Safety Tips:  Add items to prevent falls in the bathroom  Keep paths clear  Install bright lights in your home  Keep items you use often on shelves within reach  [place] or place reflective tape on the edges of your stairs  Weight Management   Why it is important to manage your weight:  Being overweight increases your risk of health conditions such as heart disease, high blood pressure, type 2 diabetes, and certain types of cancer  It can also increase your risk for osteoarthritis, sleep apnea, and other respiratory problems  Aim for a slow, steady weight loss  Even a small amount of weight loss can lower your risk of health problems  How to lose weight safely:  A safe and healthy way to lose weight is to eat fewer calories and get regular exercise  You can lose up about 1 pound a week by decreasing the number of calories you eat by 500 calories each day  Healthy meal plan for weight management:  A healthy meal plan includes a variety of foods, contains fewer calories, and helps you stay healthy  A healthy meal plan includes the following:  Eat whole-grain foods more often  A healthy meal plan should contain fiber  Fiber is the part of grains, fruits, and vegetables that is not broken down by your body  Whole-grain foods are healthy and provide extra fiber in your diet  Some examples of whole-grain foods are whole-wheat breads and pastas, oatmeal, brown rice, and bulgur  Eat a variety of vegetables every day  Include dark, leafy greens such as spinach, kale, matt greens, and mustard greens  Eat yellow and orange vegetables such as carrots, sweet potatoes, and winter squash  Eat a variety of fruits every day  Choose fresh or canned fruit (canned in its own juice or light syrup) instead of juice  Fruit juice has very little or no fiber  Eat low-fat dairy foods  Drink fat-free (skim) milk or 1% milk  Eat fat-free yogurt and low-fat cottage cheese   Try low-fat cheeses such as mozzarella and other reduced-fat cheeses  Choose meat and other protein foods that are low in fat  Choose beans or other legumes such as split peas or lentils  Choose fish, skinless poultry (chicken or turkey), or lean cuts of red meat (beef or pork)  Before you cook meat or poultry, cut off any visible fat  Use less fat and oil  Try baking foods instead of frying them  Add less fat, such as margarine, sour cream, regular salad dressing and mayonnaise to foods  Eat fewer high-fat foods  Some examples of high-fat foods include french fries, doughnuts, ice cream, and cakes  Eat fewer sweets  Limit foods and drinks that are high in sugar  This includes candy, cookies, regular soda, and sweetened drinks  Exercise:  Exercise at least 30 minutes per day on most days of the week  Some examples of exercise include walking, biking, dancing, and swimming  You can also fit in more physical activity by taking the stairs instead of the elevator or parking farther away from stores  Ask your healthcare provider about the best exercise plan for you  © Copyright [employer] 2018 Information is for End User's use only and may not be sold, redistributed or otherwise used for commercial purposes   All illustrations and images included in CareNotes® are the copyrighted property of [employer]  or [address] [place] [address]

## 2022-11-07 ENCOUNTER — OFFICE VISIT (OUTPATIENT)
Dept: CARDIOLOGY CLINIC | Facility: CLINIC | Age: 77
End: 2022-11-07

## 2022-11-07 VITALS
DIASTOLIC BLOOD PRESSURE: 80 MMHG | SYSTOLIC BLOOD PRESSURE: 142 MMHG | RESPIRATION RATE: 16 BRPM | BODY MASS INDEX: 32.21 KG/M2 | HEIGHT: 70 IN | OXYGEN SATURATION: 97 % | HEART RATE: 70 BPM | WEIGHT: 225 LBS

## 2022-11-07 DIAGNOSIS — E78.2 MIXED HYPERLIPIDEMIA: ICD-10-CM

## 2022-11-07 DIAGNOSIS — I25.10 CORONARY ARTERY DISEASE INVOLVING NATIVE HEART WITHOUT ANGINA PECTORIS, UNSPECIFIED VESSEL OR LESION TYPE: ICD-10-CM

## 2022-11-07 DIAGNOSIS — I10 PRIMARY HYPERTENSION: ICD-10-CM

## 2022-11-07 DIAGNOSIS — I35.9 AORTIC VALVE DISORDER: Primary | ICD-10-CM

## 2022-11-07 NOTE — PROGRESS NOTES
[facility] [address] [address]  Cardiology Follow Up    [facility]  1945  [id number]      1  Aortic valve disorder     2  Coronary artery disease involving native heart without angina pectoris, unspecified vessel or lesion type     3  Primary hypertension     4  Mixed hyperlipidemia         Chief Complaint   Patient presents with   • Follow-up       Interval History:  Patient presents for follow-up visit  Patient denies any chest pain  No shortness of breath out of the ordinary  No history of leg edema orthopnea PND  Patient states that he has been compliant all his present medications  No history of lightheadedness or dizziness      Patient Active Problem List   Diagnosis   • Allergic rhinitis   • CAD (coronary artery disease)   • Hypertension   • BPH with obstruction/lower urinary tract symptoms   • Gait instability   • Hyperlipidemia   • Insomnia   • Male erectile dysfunction   • Pulmonary nodule seen on imaging study   • History of stroke   • S/P TAVR (transcatheter aortic valve replacement)   • Former tobacco use   • Chronic diastolic congestive heart failure (HCC)   • AVB (atrioventricular block)   • Hypertensive heart disease with chronic diastolic congestive heart failure (HCC)     Past Medical History:   Diagnosis Date   • Arthritis    • AVB (atrioventricular block)     1st degree   • BPH (benign prostatic hyperplasia)    • CAD (coronary artery disease)     s/p CABGx2   • CHF (congestive heart failure) (HCC)    • Chronic rhinitis    • Former tobacco use    • Gait instability    • History of CVA (cerebrovascular accident)     w/ residual left-sided weakness   • Hyperlipidemia    • Hypertension    • Insomnia    • Obesity     BMI 32 82   • Pulmonary nodule     2cm   • Severe aortic stenosis    • Vitamin D deficiency      Social History     Socioeconomic History   • Marital status: Single     Spouse name: Not on file   • Number of children: Not on file   • Years of education: Not on file   • Highest education level: Not on file   Occupational History   • Not on file   Tobacco Use   • Smoking status: Former Smoker     Packs/day: 1 50     Years: 4 00     Pack years: 6 00     Types: Cigarettes     Quit date: 56     Years since quittin 8   • Smokeless tobacco: Never Used   • Tobacco comment: former light tobacco smoker   Vaping Use   • Vaping Use: Never used   Substance and Sexual Activity   • Alcohol use: Not Currently   • Drug use: No   • Sexual activity: Never   Other Topics Concern   • Not on file   Social History Narrative    Caffeine use    No advance directives     Social Determinants of Health     Financial Resource Strain: Low Risk    • Difficulty of Paying Living Expenses: Not hard at all   Food Insecurity: Not on file   Transportation Needs: No Transportation Needs   • Lack of Transportation (Medical): No   • Lack of Transportation (Non-Medical):  No   Physical Activity: Not on file   Stress: Not on file   Social Connections: Not on file   Intimate Partner Violence: Not on file   Housing Stability: Not on file      Family History   Problem Relation Age of Onset   • Coronary artery disease Mother    • Arthritis Mother    • Hypertension Mother    • Stroke Mother    • Other Father         aortic valve replacement; CABG   • Arthritis Father    • Hypertension Brother    • Stroke Brother      Past Surgical History:   Procedure Laterality Date   • CARDIAC CATHETERIZATION N/A 2022    Procedure: CARDIAC TAVR;  Surgeon: Mel Mclaughlin DO;  Location: [facility];  Service: Cardiology   • CORONARY ARTERY BYPASS GRAFT  2004    x2 LIMA-LAD, SVG-RCA   • NH REPLACE AORTIC VALVE OPENFEMORAL ARTERY APPROACH N/A 2022    Procedure: REPLACEMENT AORTIC VALVE TRANSCATHETER (TAVR) TRANSFEMORAL W/ 26MM LOPEZ CHUCK S3 ULTRA VALVE(ACCESS ON LEFT) LORIE;  Surgeon: Kali Montenegro DO;  Location: [facility];  Service: Cardiac Surgery   • TONSILLECTOMY         Current Outpatient Medications:   •  amLODIPine (NORVASC) 5 mg tablet, TAKE ONE TABLET BY MOUTH EVERY DAY, Disp: 90 tablet, Rfl: 2  •  aspirin (ECOTRIN LOW STRENGTH) 81 mg EC tablet, Take 1 tablet by mouth daily, Disp: , Rfl:   •  atorvastatin (LIPITOR) 40 mg tablet, TAKE ONE TABLET BY MOUTH EVERY DAY, Disp: 90 tablet, Rfl: 2  •  carvedilol (COREG) 6 25 mg tablet, Take 1 tablet (6 25 mg total) by mouth 2 (two) times a day with meals, Disp: 180 tablet, Rfl: 1  •  Cholecalciferol (VITAMIN D3) 5000 units CAPS, Take by mouth daily , Disp: , Rfl:   •  clopidogrel (PLAVIX) 75 mg tablet, Take 1 tablet (75 mg total) by mouth daily, Disp: 90 tablet, Rfl: 3  •  finasteride (PROSCAR) 5 mg tablet, TAKE ONE TABLET BY MOUTH EVERY DAY, Disp: 90 tablet, Rfl: 2  •  mirtazapine (REMERON) 15 mg tablet, TAKE ONE TABLET BY MOUTH EVERY DAY, Disp: 90 tablet, Rfl: 2  •  olmesartan-hydrochlorothiazide (BENICAR HCT) 40-25 MG per tablet, TAKE ONE TABLET BY MOUTH EVERY DAY, Disp: 90 tablet, Rfl: 2  No Known Allergies    Labs:  No visits with results within 2 Month(s) from this visit     Latest known visit with results is:   Orders Only on 06/10/2022   Component Date Value   • White Blood Cell Count 06/10/2022 6 5    • Red Blood Cell Count 06/10/2022 4 22    • Hemoglobin 06/10/2022 13 8    • HCT 06/10/2022 40 6    • MCV 06/10/2022 96    • MCH 06/10/2022 32 7    • MCHC 06/10/2022 34 0    • RDW 06/10/2022 12 1    • Platelet Count 34/60/0311 178    • Neutrophils 06/10/2022 69    • Lymphocytes 06/10/2022 19    • Monocytes 06/10/2022 10    • Eosinophils 06/10/2022 1    • Basophils PCT 06/10/2022 1    • Neutrophils (Absolute) 06/10/2022 4 5    • Lymphocytes (Absolute) 06/10/2022 1 2    • Monocytes (Absolute) 06/10/2022 0 6    • Eosinophils (Absolute) 06/10/2022 0 1    • Basophils ABS 06/10/2022 0 0    • Immature Granulocytes 06/10/2022 0    • Immature Granulocytes (A* 06/10/2022 0 0    • Glucose, Random 06/10/2022 107 (A)   • BUN 06/10/2022 25    • Creatinine 06/10/2022 1 17    • eGFR 06/10/2022 64    • SL AMB BUN/CREATININE RA* 06/10/2022 21    • Sodium 06/10/2022 144    • Potassium 06/10/2022 4 2    • Chloride 06/10/2022 103    • CO2 06/10/2022 26    • CALCIUM 06/10/2022 9 3    • Protein, Total 06/10/2022 7 0    • Albumin 06/10/2022 4 3    • Globulin, Total 06/10/2022 2 7    • Albumin/Globulin Ratio 06/10/2022 1 6    • TOTAL BILIRUBIN 06/10/2022 1 0    • Alk Phos Isoenzymes 06/10/2022 99    • AST 06/10/2022 27    • ALT 06/10/2022 23    • Cholesterol, Total 06/10/2022 129    • Triglycerides 06/10/2022 127    • HDL 06/10/2022 35 (A)   • LDL Calculated 06/10/2022 71    • HEP C AB 06/10/2022 <0 1    • Interpretation: 06/10/2022 Comment    • Hemoglobin A1C 06/10/2022 4 8    • Prostate Specific Antige* 06/10/2022 0 3    • 25-HYDROXY VIT D 06/10/2022 70 2    • TSH 06/10/2022 3 220      Imaging: No results found  Review of Systems:  Review of Systems   REVIEW OF SYSTEMS:  Constitutional:  Denies fever or chills   Eyes:  Denies change in visual acuity   HENT:  Denies nasal congestion or sore throat   Respiratory: shortness of breath   Cardiovascular:  Denies chest pain or edema   GI:  Denies abdominal pain, nausea, vomiting, bloody stools or diarrhea   :  Denies dysuria, frequency, difficulty in micturition and nocturia  Musculoskeletal:  Denies back pain or joint pain   Neurologic:  Denies headache, focal weakness or sensory changes   Endocrine:  Denies polyuria or polydipsia   Lymphatic:  Denies swollen glands   Psychiatric:  anxiety     Physical Exam:    /80 (BP Location: Left arm, Patient Position: Sitting, Cuff Size: Standard)   Pulse 70   Resp 16   Ht 5' 10" (1 778 m)   Wt 102 kg (225 lb)   SpO2 97%   BMI 32 28 kg/m²     Physical Exam   PHYSICAL EXAM:  General:  Patient is not in acute distress   Head: Normocephalic, Atraumatic  HEENT:  Both pupils normal-size atraumatic, normocephalic, nonicteric  Neck:  JVP not raised  Trachea central  No carotid bruit  Respiratory:  normal breath sounds no crackles  no rhonchi  Cardiovascular:  Regular rate and rhythm no S3 no murmurs  GI:  Abdomen soft nontender  No organomegaly  Lymphatic:  No cervical or inguinal lymphadenopathy  Neurologic:  Patient is awake alert, oriented   Grossly nonfocal  Extremities no edema      Discussion/Summary:    Patient with multiple medical problems who seems to be doing reasonably well from cardiac standpoint  Previous studies reviewed with patient  Medications reviewed and possible side effects discussed  concepts of cardiovascular disease , signs and symptoms of heart disease  Dietary and risk factor modification reinforced  All questions answered  Safety measures reviewed  Patient advised to report any problems prompting medical attention  Events leading in to TAVR reviewed with the patient  Patient has yearly follow-up with Cardiac surgery  Importance of salt restriction reinforced  Patient does have history of stroke and is on long-term Plavix  Patient to report any bleeding issues  Symptoms to watch out from cardiac standpoint which would indicate the need for further cardiac evaluation also discussed  Medications reviewed  Follow-up in 6 months or earlier as needed  Follow-up with primary care physician

## 2022-11-18 ENCOUNTER — TELEPHONE (OUTPATIENT)
Dept: INTERNAL MEDICINE CLINIC | Facility: CLINIC | Age: 77
End: 2022-11-18

## 2022-11-18 LAB
B BURGDOR IGG+IGM SER QL IA: NEGATIVE
BASOPHILS # BLD AUTO: 0 X10E3/UL (ref 0–0.2)
BASOPHILS NFR BLD AUTO: 0 %
BUN SERPL-MCNC: 22 MG/DL (ref 8–27)
BUN/CREAT SERPL: 21 (ref 10–24)
CALCIUM SERPL-MCNC: 9.5 MG/DL (ref 8.6–10.2)
CHLORIDE SERPL-SCNC: 99 MMOL/L (ref 96–106)
CO2 SERPL-SCNC: 26 MMOL/L (ref 20–29)
CREAT SERPL-MCNC: 1.04 MG/DL (ref 0.76–1.27)
EGFR: 73 ML/MIN/1.73
EOSINOPHIL # BLD AUTO: 0.1 X10E3/UL (ref 0–0.4)
EOSINOPHIL NFR BLD AUTO: 1 %
ERYTHROCYTE [DISTWIDTH] IN BLOOD BY AUTOMATED COUNT: 12 % (ref 11.6–15.4)
GLUCOSE SERPL-MCNC: 95 MG/DL (ref 70–99)
HCT VFR BLD AUTO: 42.3 % (ref 37.5–51)
HGB BLD-MCNC: 14.7 G/DL (ref 13–17.7)
IMM GRANULOCYTES # BLD: 0 X10E3/UL (ref 0–0.1)
IMM GRANULOCYTES NFR BLD: 0 %
LYMPHOCYTES # BLD AUTO: 1.5 X10E3/UL (ref 0.7–3.1)
LYMPHOCYTES NFR BLD AUTO: 23 %
MCH RBC QN AUTO: 31.8 PG (ref 26.6–33)
MCHC RBC AUTO-ENTMCNC: 34.8 G/DL (ref 31.5–35.7)
MCV RBC AUTO: 92 FL (ref 79–97)
MONOCYTES # BLD AUTO: 0.6 X10E3/UL (ref 0.1–0.9)
MONOCYTES NFR BLD AUTO: 10 %
NEUTROPHILS # BLD AUTO: 4.2 X10E3/UL (ref 1.4–7)
NEUTROPHILS NFR BLD AUTO: 66 %
PLATELET # BLD AUTO: 182 X10E3/UL (ref 150–450)
POTASSIUM SERPL-SCNC: 4 MMOL/L (ref 3.5–5.2)
RBC # BLD AUTO: 4.62 X10E6/UL (ref 4.14–5.8)
SODIUM SERPL-SCNC: 140 MMOL/L (ref 134–144)
WBC # BLD AUTO: 6.5 X10E3/UL (ref 3.4–10.8)

## 2022-12-15 DIAGNOSIS — I25.10 CORONARY ARTERY DISEASE: ICD-10-CM

## 2022-12-15 DIAGNOSIS — F51.02 ADJUSTMENT INSOMNIA: ICD-10-CM

## 2022-12-15 DIAGNOSIS — I25.10 CORONARY ARTERY DISEASE INVOLVING NATIVE HEART WITHOUT ANGINA PECTORIS, UNSPECIFIED VESSEL OR LESION TYPE: ICD-10-CM

## 2022-12-15 RX ORDER — MIRTAZAPINE 15 MG/1
TABLET, FILM COATED ORAL
Qty: 90 TABLET | Refills: 2 | Status: SHIPPED | OUTPATIENT
Start: 2022-12-15

## 2022-12-15 RX ORDER — CLOPIDOGREL BISULFATE 75 MG/1
TABLET ORAL
Qty: 90 TABLET | Refills: 3 | Status: SHIPPED | OUTPATIENT
Start: 2022-12-15

## 2022-12-15 RX ORDER — CARVEDILOL 6.25 MG/1
TABLET ORAL
Qty: 180 TABLET | Refills: 1 | Status: SHIPPED | OUTPATIENT
Start: 2022-12-15

## 2023-02-10 ENCOUNTER — TELEPHONE (OUTPATIENT)
Dept: INTERNAL MEDICINE CLINIC | Facility: CLINIC | Age: 78
End: 2023-02-10

## 2023-02-10 NOTE — TELEPHONE ENCOUNTER
FYI :    Patient's BP has been running high for the past week, 190/100--does not have any other symptoms, he is concerned, does not want to wait until march to address this    I scheduled in an appt 2/13

## 2023-02-13 ENCOUNTER — OFFICE VISIT (OUTPATIENT)
Dept: INTERNAL MEDICINE CLINIC | Facility: CLINIC | Age: 78
End: 2023-02-13

## 2023-02-13 VITALS
RESPIRATION RATE: 16 BRPM | HEART RATE: 60 BPM | TEMPERATURE: 97.5 F | OXYGEN SATURATION: 98 % | DIASTOLIC BLOOD PRESSURE: 74 MMHG | WEIGHT: 225 LBS | SYSTOLIC BLOOD PRESSURE: 146 MMHG | BODY MASS INDEX: 32.21 KG/M2 | HEIGHT: 70 IN

## 2023-02-13 DIAGNOSIS — I50.32 HYPERTENSIVE HEART DISEASE WITH CHRONIC DIASTOLIC CONGESTIVE HEART FAILURE (HCC): Primary | Chronic | ICD-10-CM

## 2023-02-13 DIAGNOSIS — I11.0 HYPERTENSIVE HEART DISEASE WITH CHRONIC DIASTOLIC CONGESTIVE HEART FAILURE (HCC): Primary | Chronic | ICD-10-CM

## 2023-02-13 RX ORDER — AMLODIPINE BESYLATE 10 MG/1
10 TABLET ORAL DAILY
Qty: 90 TABLET | Refills: 1 | Status: SHIPPED | OUTPATIENT
Start: 2023-02-13

## 2023-02-13 RX ORDER — CARVEDILOL 12.5 MG/1
12.5 TABLET ORAL 2 TIMES DAILY WITH MEALS
Qty: 180 TABLET | Refills: 1 | Status: SHIPPED | OUTPATIENT
Start: 2023-02-13

## 2023-02-13 NOTE — PATIENT INSTRUCTIONS
Chronic Hypertension   AMBULATORY CARE:   Hypertension  is high blood pressure  Your blood pressure is the force of your blood moving against the walls of your arteries  Hypertension causes your blood pressure to get so high that your heart has to work much harder than normal  This can damage your heart  Even if you have hypertension for years, lifestyle changes, medicines, or both can help bring your blood pressure to normal   Call your local emergency number (911 in the [address] [facility]) or have someone call if:   You have chest pain  You have any of the following signs of a heart attack:      Squeezing, pressure, or pain in your chest    You may  also have any of the following:     Discomfort or pain in your back, neck, jaw, stomach, or arm    Shortness of breath    Nausea or vomiting    Lightheadedness or a sudden cold sweat    You become confused or have difficulty speaking  You suddenly feel lightheaded or have trouble breathing  Seek care immediately if:   You have a severe headache or vision loss  You have weakness in an arm or leg  Call your doctor or cardiologist if:   You feel faint, dizzy, confused, or drowsy  You have been taking your blood pressure medicine but your pressure is higher than your provider says it should be  You have questions or concerns about your condition or care  Treatment for chronic hypertension  may include medicine to lower your blood pressure and cholesterol levels  A low cholesterol level helps prevent heart disease and makes it easier to control your blood pressure  Heart disease can make your blood pressure harder to control  You may also need to make lifestyle changes  What you need to know about the stages of hypertension:       Normal blood pressure is 119/79 or lower   Your healthcare provider may only check your blood pressure each year if it stays at a normal level  Elevated blood pressure is 120/79 to 129/79   This is sometimes called prehypertension  Your healthcare provider may suggest lifestyle changes to help lower your blood pressure to a normal level  He or she may then check it again in 3 to 6 months  Stage 1 hypertension is 130/80  to 139/89   Your provider may recommend lifestyle changes, medication, and checks every 3 to 6 months until your blood pressure is controlled  Stage 2 hypertension is 140/90 or higher   Your provider will recommend lifestyle changes and have you take 2 kinds of hypertension medicines  You will also need to have your blood pressure checked monthly until it is controlled  Manage chronic hypertension:   Check your blood pressure at home  Avoid smoking, caffeine, and exercise at least 30 minutes before checking your blood pressure  Sit and rest for 5 minutes before you take your blood pressure  Extend your arm and support it on a flat surface  Your arm should be at the same level as your heart  Follow the directions that came with your blood pressure monitor  Check your blood pressure 2 times, 1 minute apart, before you take your medicine in the morning  Also check your blood pressure before your evening meal  Keep a record of your readings and bring it to your follow-up visits  Ask your healthcare provider what your blood pressure should be  Manage any other health conditions you have  Health conditions such as diabetes can increase your risk for hypertension  Follow your healthcare provider's instructions and take all your medicines as directed  Talk to your healthcare provider about any new health conditions you have recently developed  Ask about all medicines  Certain medicines can increase your blood pressure  Examples include oral birth control pills, decongestants, herbal supplements, and NSAIDs, such as ibuprofen  Your healthcare provider can tell you which medicines are safe for you to take  This includes prescription and over-the-counter medicines      Lifestyle changes you can make to lower your blood pressure: Your provider may want you to make more lifestyle changes if you are having trouble controlling your blood pressure  This may feel difficult over time, especially if you think you are making good changes but your pressure is still high  It might help to focus on one new change at a time  For example, try to add 1 more day of exercise, or exercise for an extra 10 minutes on 2 days  Small changes can make a big difference  Your healthcare provider can also refer you to specialists such as a dietitian who can help you make small changes  Your family members may be included in helping you learn to create lifestyle changes, such as the following:     Limit sodium (salt) as directed  Too much sodium can affect your fluid balance  Check labels to find low-sodium or no-salt-added foods  Some low-sodium foods use potassium salts for flavor  Too much potassium can also cause health problems  Your healthcare provider will tell you how much sodium and potassium are safe for you to have in a day  He or she may recommend that you limit sodium to 2,300 mg a day  Follow the meal plan recommended by your healthcare provider  A dietitian or your provider can give you more information on low-sodium plans or the DASH (Dietary Approaches to Stop Hypertension) eating plan  The DASH plan is low in sodium, processed sugar, unhealthy fats, and total fat  It is high in potassium, calcium, and fiber  These can be found in vegetables, fruit, and whole-grain foods  Be physically active throughout the day  Physical activity, such as exercise, can help control your blood pressure and your weight  Be physically active for at least 30 minutes per day, on most days of the week  Include aerobic activity, such as walking or riding a bicycle  Also include strength training at least 2 times each week  Your healthcare providers can help you create a physical activity plan  Decrease stress    This may help lower your blood pressure  Learn ways to relax, such as deep breathing or listening to music  Limit alcohol as directed  Alcohol can increase your blood pressure  A drink of alcohol is 12 ounces of beer, 5 ounces of wine, or 1½ ounces of liquor  Do not smoke  Nicotine and other chemicals in cigarettes and cigars can increase your blood pressure and also cause lung damage  Ask your healthcare provider for information if you currently smoke and need help to quit  E-cigarettes or smokeless tobacco still contain nicotine  Talk to your healthcare provider before you use these products  Follow up with your doctor or cardiologist as directed: You will need to return to have your blood pressure checked and to have other lab tests done  Write down your questions so you remember to ask them during your visits  © Copyright [employer] 2022 Information is for End User's use only and may not be sold, redistributed or otherwise used for commercial purposes  All illustrations and images included in CareNotes® are the copyrighted property of A D A M [employer] Inc  or [facility]   The above information is an  only  It is not intended as medical advice for individual conditions or treatments  Talk to your doctor, nurse or pharmacist before following any medical regimen to see if it is safe and effective for you  No pertinent family history in first degree relatives

## 2023-02-13 NOTE — PROGRESS NOTES
Name: Zayda Quintero      : 1945      MRN: [id number]  Encounter Provider: Paula Altman DO  Encounter Date: 2023   Encounter department: [facility]  [address]     1  Hypertensive heart disease with chronic diastolic congestive heart failure ([facility] [address] )    I got 158/66 when I re-checked it  His numbers have been running high at home  Fortunately, he is without symptoms  Continue olmesartan-hctz  Will increase dose of coreg and amlodipine  Continue monitoring BP at home  Follow DASH diet  Appears euvolemic today  Monitor weight at home  Avoid excess salt  - amLODIPine (NORVASC) 10 mg tablet; Take 1 tablet (10 mg total) by mouth daily  Dispense: 90 tablet; Refill: 1  - carvedilol (COREG) 12 5 mg tablet; Take 1 tablet (12 5 mg total) by mouth 2 (two) times a day with meals  Dispense: 180 tablet; Refill: 1  - CBC; Future  - Comprehensive metabolic panel; Future  - Lipid Panel with Direct LDL reflex; Future        Return in about 5 weeks (around 3/23/2023) for Follow-up  Subjective      Bp has been running high at home  He denies any symptoms but was worried  He has been getting several values > 160/90  Didn't bring his cuff with him today  Review of Systems   Constitutional: Negative  Respiratory: Negative  Cardiovascular: Negative  Gastrointestinal: Negative  Neurological: Negative  Objective     /74 (BP Location: Left arm, Patient Position: Sitting, Cuff Size: Large)   Pulse 60   Temp 97 5 °F (36 4 °C) (Tympanic)   Resp 16   Ht 5' 10" (1 778 m)   Wt 102 kg (225 lb)   SpO2 98%   BMI 32 28 kg/m²     Physical Exam  Constitutional:       General: He is not in acute distress  Appearance: He is obese  He is not ill-appearing  Cardiovascular:      Rate and Rhythm: Normal rate and regular rhythm  Heart sounds: No murmur heard  Pulmonary:      Effort: Pulmonary effort is normal  No respiratory distress  Breath sounds:  No wheezing  Abdominal:      General: Bowel sounds are normal  There is no distension  Tenderness: There is no abdominal tenderness  Musculoskeletal:      Right lower leg: No edema  Left lower leg: No edema  Neurological:      Mental Status: He is alert         Deborah Blonder, DO

## 2023-02-16 LAB
ALBUMIN SERPL-MCNC: 4.4 G/DL (ref 3.7–4.7)
ALBUMIN/GLOB SERPL: 1.6 {RATIO} (ref 1.2–2.2)
ALP SERPL-CCNC: 105 IU/L (ref 44–121)
ALT SERPL-CCNC: 23 IU/L (ref 0–44)
AST SERPL-CCNC: 29 IU/L (ref 0–40)
BASOPHILS # BLD AUTO: 0 X10E3/UL (ref 0–0.2)
BASOPHILS NFR BLD AUTO: 0 %
BILIRUB SERPL-MCNC: 1.2 MG/DL (ref 0–1.2)
BUN SERPL-MCNC: 33 MG/DL (ref 8–27)
BUN/CREAT SERPL: 29 (ref 10–24)
CALCIUM SERPL-MCNC: 9.5 MG/DL (ref 8.6–10.2)
CHLORIDE SERPL-SCNC: 99 MMOL/L (ref 96–106)
CHOLEST SERPL-MCNC: 136 MG/DL (ref 100–199)
CO2 SERPL-SCNC: 28 MMOL/L (ref 20–29)
CREAT SERPL-MCNC: 1.15 MG/DL (ref 0.76–1.27)
EGFR: 65 ML/MIN/1.73
EOSINOPHIL # BLD AUTO: 0.1 X10E3/UL (ref 0–0.4)
EOSINOPHIL NFR BLD AUTO: 2 %
ERYTHROCYTE [DISTWIDTH] IN BLOOD BY AUTOMATED COUNT: 11.8 % (ref 11.6–15.4)
GLOBULIN SER-MCNC: 2.8 G/DL (ref 1.5–4.5)
GLUCOSE SERPL-MCNC: 105 MG/DL (ref 70–99)
HCT VFR BLD AUTO: 43.1 % (ref 37.5–51)
HDLC SERPL-MCNC: 34 MG/DL
HGB BLD-MCNC: 15.2 G/DL (ref 13–17.7)
IMM GRANULOCYTES # BLD: 0 X10E3/UL (ref 0–0.1)
IMM GRANULOCYTES NFR BLD: 0 %
LDLC SERPL CALC-MCNC: 75 MG/DL (ref 0–99)
LYMPHOCYTES # BLD AUTO: 1.7 X10E3/UL (ref 0.7–3.1)
LYMPHOCYTES NFR BLD AUTO: 23 %
MCH RBC QN AUTO: 32.9 PG (ref 26.6–33)
MCHC RBC AUTO-ENTMCNC: 35.3 G/DL (ref 31.5–35.7)
MCV RBC AUTO: 93 FL (ref 79–97)
MONOCYTES # BLD AUTO: 0.8 X10E3/UL (ref 0.1–0.9)
MONOCYTES NFR BLD AUTO: 11 %
NEUTROPHILS # BLD AUTO: 4.6 X10E3/UL (ref 1.4–7)
NEUTROPHILS NFR BLD AUTO: 64 %
PLATELET # BLD AUTO: 197 X10E3/UL (ref 150–450)
POTASSIUM SERPL-SCNC: 3.6 MMOL/L (ref 3.5–5.2)
PROT SERPL-MCNC: 7.2 G/DL (ref 6–8.5)
RBC # BLD AUTO: 4.62 X10E6/UL (ref 4.14–5.8)
SODIUM SERPL-SCNC: 140 MMOL/L (ref 134–144)
TRIGL SERPL-MCNC: 155 MG/DL (ref 0–149)
WBC # BLD AUTO: 7.2 X10E3/UL (ref 3.4–10.8)

## 2023-03-10 ENCOUNTER — TELEPHONE (OUTPATIENT)
Dept: CARDIOLOGY CLINIC | Facility: CLINIC | Age: 78
End: 2023-03-10

## 2023-03-10 NOTE — TELEPHONE ENCOUNTER
Pt called into the office requesting a yearly f/u appointment with Dr Brandon Dailey, advised pt he will need to have some testing done prior  Will have a coordinator reach out sometime next week  [place] is his desired test site

## 2023-03-22 DIAGNOSIS — Z95.2 S/P TAVR (TRANSCATHETER AORTIC VALVE REPLACEMENT): Primary | ICD-10-CM

## 2023-03-22 DIAGNOSIS — I35.0 NONRHEUMATIC AORTIC VALVE STENOSIS: ICD-10-CM

## 2023-03-23 ENCOUNTER — OFFICE VISIT (OUTPATIENT)
Dept: INTERNAL MEDICINE CLINIC | Facility: CLINIC | Age: 78
End: 2023-03-23

## 2023-03-23 VITALS
HEIGHT: 70 IN | OXYGEN SATURATION: 98 % | SYSTOLIC BLOOD PRESSURE: 128 MMHG | TEMPERATURE: 97.8 F | WEIGHT: 231 LBS | BODY MASS INDEX: 33.07 KG/M2 | RESPIRATION RATE: 20 BRPM | HEART RATE: 53 BPM | DIASTOLIC BLOOD PRESSURE: 74 MMHG

## 2023-03-23 DIAGNOSIS — I25.10 CORONARY ARTERY DISEASE INVOLVING NATIVE CORONARY ARTERY OF NATIVE HEART WITHOUT ANGINA PECTORIS: Chronic | ICD-10-CM

## 2023-03-23 DIAGNOSIS — I50.32 HYPERTENSIVE HEART DISEASE WITH CHRONIC DIASTOLIC CONGESTIVE HEART FAILURE (HCC): Primary | Chronic | ICD-10-CM

## 2023-03-23 DIAGNOSIS — I11.0 HYPERTENSIVE HEART DISEASE WITH CHRONIC DIASTOLIC CONGESTIVE HEART FAILURE (HCC): Primary | Chronic | ICD-10-CM

## 2023-03-23 DIAGNOSIS — F51.02 ADJUSTMENT INSOMNIA: ICD-10-CM

## 2023-03-23 DIAGNOSIS — E78.2 MIXED HYPERLIPIDEMIA: Chronic | ICD-10-CM

## 2023-03-23 RX ORDER — MIRTAZAPINE 30 MG/1
30 TABLET, FILM COATED ORAL
Qty: 90 TABLET | Refills: 1 | Status: SHIPPED | OUTPATIENT
Start: 2023-03-23

## 2023-03-23 NOTE — PROGRESS NOTES
Name: Stephany Cook      : 1945      MRN: [id number]  Encounter Provider: Tevin Carrillo DO  Encounter Date: 3/23/2023   Encounter department: [facility]  [address]     1  Hypertensive heart disease with chronic diastolic congestive heart failure (HCC)    Stable  Has had previous TAVR  Has echo ordered by CV surgery  BP stable in office  Continue medications as prescribed  Appears euvolemic  Weight is up most likely because he is eating too much over past couple months  - Basic metabolic panel; Future    2  Coronary artery disease involving native coronary artery of native heart without angina pectoris    Stable without angina  On appropriate cardiovascular regimen  3  Mixed hyperlipidemia    Cholesterol controlled  Continue high intensity statin  - Lipid panel; Future    4  Adjustment insomnia    Increase remeron from 15 to 30 mg and hopefully this helps him more with sleep at night    - mirtazapine (REMERON) 30 mg tablet; Take 1 tablet (30 mg total) by mouth daily at bedtime  Dispense: 90 tablet; Refill: 1          Return in about 6 months (around 2023) for Subsequent AWV  Raghavendra Cates presents for routine follow-up  Health is stable  Taking medications as prescribed  No er visits or hospitalizations  Chronic problems with sleeping  Review of Systems   Constitutional: Negative  Respiratory: Negative  Cardiovascular: Negative  Gastrointestinal: Negative  Musculoskeletal: Negative  Psychiatric/Behavioral: Positive for sleep disturbance  Objective     /74 (BP Location: Left arm, Patient Position: Sitting, Cuff Size: Large)   Pulse (!) 53   Temp 97 8 °F (36 6 °C) (Tympanic)   Resp 20   Ht 5' 10" (1 778 m)   Wt 105 kg (231 lb)   SpO2 98%   BMI 33 15 kg/m²     Physical Exam  Constitutional:       General: He is not in acute distress  Appearance: He is not ill-appearing     Cardiovascular:      Rate and Rhythm: Normal rate and regular rhythm  Heart sounds: No murmur heard  Pulmonary:      Effort: Pulmonary effort is normal  No respiratory distress  Breath sounds: No wheezing  Abdominal:      General: Bowel sounds are normal  There is no distension  Tenderness: There is no abdominal tenderness  Musculoskeletal:      Right lower leg: No edema  Left lower leg: No edema  Neurological:      Mental Status: He is alert         Krystian Ellison, DO

## 2023-03-23 NOTE — PATIENT INSTRUCTIONS
Chronic Hypertension   AMBULATORY CARE:   Hypertension is considered chronic  when it continues for 3 months or longer  Hypertension that continues causes your heart to work much harder than normal, which may lead to heart damage  Even if you have hypertension for years, lifestyle changes, medicines, or both may help lower your blood pressure  Call your local emergency number [phone] in the [address] [facility]) or have someone call if:   You have chest pain  You have any of the following signs of a heart attack:      Squeezing, pressure, or pain in your chest    You may  also have any of the following:     Discomfort or pain in your back, neck, jaw, stomach, or arm    Shortness of breath    Nausea or vomiting    Lightheadedness or a sudden cold sweat    You become confused or have difficulty speaking  You suddenly feel lightheaded or have trouble breathing  Seek care immediately if:   You have a severe headache or vision loss  You have weakness in an arm or leg  Call your doctor or cardiologist if:   You feel faint, dizzy, confused, or drowsy  You have been taking your blood pressure medicine but your pressure is higher than your provider says it should be  You have questions or concerns about your condition or care  Treatment for chronic hypertension  may include medicine to lower your blood pressure and cholesterol levels  A low cholesterol level helps prevent heart disease and makes it easier to control your blood pressure  Heart disease can make your blood pressure harder to control  You may also need to make lifestyle changes  What you need to know about the stages of hypertension:  Your healthcare provider will give you a blood pressure goal based on your age, health, and risk for cardiovascular disease  The following are general guidelines on the stages of hypertension:  Normal blood pressure is 119/79 or lower    Your provider may only check your blood pressure each year if it stays at a normal level     Elevated blood pressure is 120/79 to 129/79   This is sometimes called prehypertension  Your provider may suggest lifestyle changes to help lower your blood pressure to a normal level  He or she may then check it again in 3 to 6 months  Stage 1 hypertension is 130/80  to 139/89   Your provider may recommend lifestyle changes, medication, and checks every 3 to 6 months until your blood pressure is controlled  Stage 2 hypertension is 140/90 or higher   Your provider will recommend lifestyle changes and have you take 2 kinds of hypertension medicines  You will also need to have your blood pressure checked monthly until it is controlled  Manage chronic hypertension:   Check your blood pressure at home  Do not smoke, have caffeine, or exercise for at least 30 minutes before you check your blood pressure  Sit and rest for 5 minutes before you check your blood pressure  Extend your arm and support it on a flat surface  Your arm should be at the same level as your heart  Follow the directions that came with your blood pressure monitor  Check your blood pressure 2 times, 1 minute apart, before you take your medicine in the morning  Also check your blood pressure before your evening meal  Keep a record of your readings and bring it to your follow-up visits  Your healthcare provider may use the readings to make changes to your treatment plan  Manage any other health conditions you have  Health conditions such as diabetes can increase your risk for hypertension  Follow your provider's instructions and take all your medicines as directed  Talk to your provider about any new health conditions you have recently developed  Ask about all medicines  Certain medicines can increase your blood pressure  Examples include oral birth control pills, decongestants, herbal supplements, and NSAIDs, such as ibuprofen  Your provider can tell you which medicines are safe for you to take   This includes prescription and over-the-counter medicines  Lifestyle changes you can make to lower your blood pressure: Your provider may want you to make more lifestyle changes if you are having trouble controlling your blood pressure  This may feel difficult over time, especially if you think you are making good changes but your pressure is still high  It might help to focus on one new change at a time  For example, try to add 1 more day of exercise, or exercise for an extra 10 minutes on 2 days  Small changes can make a big difference  Your healthcare provider can also refer you to specialists such as a dietitian who can help you make small changes  Your family members may be included in helping you learn to create lifestyle changes, such as the following:     Limit sodium (salt) as directed  Too much sodium can affect your fluid balance  Check labels to find low-sodium or no-salt-added foods  Some low-sodium foods use potassium salts for flavor  Too much potassium can also cause health problems  Your provider will tell you how much sodium and potassium are safe for you to have in a day  He or she may recommend that you limit sodium to 2,300 mg a day  Follow the meal plan recommended by your provider  A dietitian or your provider can give you more information on low-sodium plans or the DASH (Dietary Approaches to Stop Hypertension) eating plan  The DASH plan is low in sodium, processed sugar, unhealthy fats, and total fat  It is high in potassium, calcium, and fiber  These can be found in vegetables, fruit, and whole-grain foods  Be physically active throughout the day  Physical activity, such as exercise, can help control your blood pressure and your weight  Be physically active for at least 30 minutes per day, on most days of the week  Include aerobic activity, such as walking or riding a bicycle  Also include strength training at least 2 times each week   Your provider can help you create a physical activity plan  Decrease stress  This may help lower your blood pressure  Learn ways to relax, such as deep breathing or listening to music  Limit alcohol as directed  Alcohol can increase your blood pressure  A drink of alcohol is 12 ounces of beer, 5 ounces of wine, or 1½ ounces of liquor  Your provider can help you set daily and weekly drink limits  He or she may recommend no alcohol if your blood pressure stays higher than goal even with medicine or other measures  Ask your provider for information if you need help to quit  Do not smoke  Nicotine and other chemicals in cigarettes and cigars can increase your blood pressure and also cause lung damage  Ask your provider for information if you currently smoke and need help to quit  E-cigarettes or smokeless tobacco still contain nicotine  Talk to your provider before you use these products  Follow up with your doctor or cardiologist as directed: You will need to return to have your blood pressure checked and to have other lab tests done  Write down your questions so you remember to ask them during your visits  © Copyright [employer] 2022 Information is for End User's use only and may not be sold, redistributed or otherwise used for commercial purposes  The above information is an  only  It is not intended as medical advice for individual conditions or treatments  Talk to your doctor, nurse or pharmacist before following any medical regimen to see if it is safe and effective for you

## 2023-03-27 DIAGNOSIS — I10 BENIGN ESSENTIAL HYPERTENSION: ICD-10-CM

## 2023-03-27 DIAGNOSIS — E78.2 MIXED HYPERLIPIDEMIA: ICD-10-CM

## 2023-03-27 DIAGNOSIS — N13.8 BPH WITH OBSTRUCTION/LOWER URINARY TRACT SYMPTOMS: Chronic | ICD-10-CM

## 2023-03-27 DIAGNOSIS — N40.1 BPH WITH OBSTRUCTION/LOWER URINARY TRACT SYMPTOMS: Chronic | ICD-10-CM

## 2023-03-27 RX ORDER — FINASTERIDE 5 MG/1
TABLET, FILM COATED ORAL
Qty: 90 TABLET | Refills: 2 | Status: SHIPPED | OUTPATIENT
Start: 2023-03-27

## 2023-03-27 RX ORDER — OLMESARTAN MEDOXOMIL AND HYDROCHLOROTHIAZIDE 40/25 40; 25 MG/1; MG/1
TABLET ORAL
Qty: 90 TABLET | Refills: 2 | Status: SHIPPED | OUTPATIENT
Start: 2023-03-27

## 2023-03-27 RX ORDER — ATORVASTATIN CALCIUM 40 MG/1
TABLET, FILM COATED ORAL
Qty: 90 TABLET | Refills: 2 | Status: SHIPPED | OUTPATIENT
Start: 2023-03-27

## 2023-04-11 ENCOUNTER — CLINICAL SUPPORT (OUTPATIENT)
Dept: URGENT CARE | Facility: CLINIC | Age: 78
End: 2023-04-11
Payer: COMMERCIAL

## 2023-04-11 DIAGNOSIS — I35.0 NONRHEUMATIC AORTIC VALVE STENOSIS: ICD-10-CM

## 2023-04-11 DIAGNOSIS — Z95.2 S/P TAVR (TRANSCATHETER AORTIC VALVE REPLACEMENT): ICD-10-CM

## 2023-04-11 PROCEDURE — 93005 ELECTROCARDIOGRAM TRACING: CPT

## 2023-04-24 ENCOUNTER — OFFICE VISIT (OUTPATIENT)
Dept: URGENT CARE | Facility: CLINIC | Age: 78
End: 2023-04-24

## 2023-04-24 ENCOUNTER — APPOINTMENT (OUTPATIENT)
Dept: RADIOLOGY | Facility: CLINIC | Age: 78
End: 2023-04-24

## 2023-04-24 VITALS
HEART RATE: 67 BPM | DIASTOLIC BLOOD PRESSURE: 78 MMHG | BODY MASS INDEX: 33.07 KG/M2 | HEIGHT: 70 IN | WEIGHT: 231 LBS | TEMPERATURE: 98 F | OXYGEN SATURATION: 97 % | SYSTOLIC BLOOD PRESSURE: 170 MMHG | RESPIRATION RATE: 18 BRPM

## 2023-04-24 DIAGNOSIS — S70.02XA CONTUSION OF LEFT HIP, INITIAL ENCOUNTER: Primary | ICD-10-CM

## 2023-04-24 DIAGNOSIS — M25.552 LEFT HIP PAIN: ICD-10-CM

## 2023-04-24 RX ORDER — SENNOSIDES 8.6 MG
650 CAPSULE ORAL EVERY 8 HOURS PRN
Qty: 30 TABLET | Refills: 0 | Status: SHIPPED | OUTPATIENT
Start: 2023-04-24

## 2023-04-24 NOTE — PROGRESS NOTES
[address]        NAME: Gris Linda is a 76 y o  male  : 1945    MRN: [id number]  DATE: 2023  TIME: 10:26 AM    Assessment and Plan   Contusion of left hip, initial encounter [S70 02XA]  1  Contusion of left hip, initial encounter  acetaminophen (TYLENOL) 650 mg CR tablet      2  Left hip pain  XR hip/pelv 2-3 vws left if performed        - Left hip XR did not reveal acute fracture or osseous abnormality per my read  Will follow up on offical radiology read  - Encouraged PCP follow up if symptoms persist     Patient Instructions       Follow up with PCP in 3-5 days  Proceed to  ER if symptoms worsen  Chief Complaint     Chief Complaint   Patient presents with   • Feli Gutierres, Friday hit his left hip  Lump on left side  Tylenol  History of Present Illness       Patient is a 77 yo male who presents for evaluation of left hip pain x 4 days following a fall He did not hit his head or lose consciousness  He is on ASA and Plavix  The pain does not radiate and he denies numbness, tingling, weakness, reduced ROM, inability to bear weight   No bruising or swelling         Review of Systems   Review of Systems      Current Medications       Current Outpatient Medications:   •  acetaminophen (TYLENOL) 650 mg CR tablet, Take 1 tablet (650 mg total) by mouth every 8 (eight) hours as needed for mild pain, Disp: 30 tablet, Rfl: 0  •  amLODIPine (NORVASC) 10 mg tablet, Take 1 tablet (10 mg total) by mouth daily, Disp: 90 tablet, Rfl: 1  •  aspirin (ECOTRIN LOW STRENGTH) 81 mg EC tablet, Take 1 tablet by mouth daily, Disp: , Rfl:   •  atorvastatin (LIPITOR) 40 mg tablet, TAKE ONE TABLET BY MOUTH EVERY DAY, Disp: 90 tablet, Rfl: 2  •  carvedilol (COREG) 12 5 mg tablet, Take 1 tablet (12 5 mg total) by mouth 2 (two) times a day with meals, Disp: 180 tablet, Rfl: 1  •  Cholecalciferol (VITAMIN D3) 5000 units CAPS, Take by mouth daily , Disp: , Rfl:   •  clopidogrel (PLAVIX) 75 mg tablet, TAKE ONE TABLET BY MOUTH EVERY DAY, Disp: 90 tablet, Rfl: 3  •  finasteride (PROSCAR) 5 mg tablet, TAKE ONE TABLET BY MOUTH EVERY DAY, Disp: 90 tablet, Rfl: 2  •  mirtazapine (REMERON) 30 mg tablet, Take 1 tablet (30 mg total) by mouth daily at bedtime, Disp: 90 tablet, Rfl: 1  •  olmesartan-hydrochlorothiazide (BENICAR HCT) 40-25 MG per tablet, TAKE ONE TABLET BY MOUTH EVERY DAY, Disp: 90 tablet, Rfl: 2    Current Allergies     Allergies as of 04/24/2023   • (No Known Allergies)            The following portions of the patient's history were reviewed and updated as appropriate: allergies, current medications, past family history, past medical history, past social history, past surgical history and problem list      Past Medical History:   Diagnosis Date   • Arthritis    • AVB (atrioventricular block)     1st degree   • BPH (benign prostatic hyperplasia)    • CAD (coronary artery disease)     s/p CABGx2   • CHF (congestive heart failure) (HCC)    • Chronic rhinitis    • Former tobacco use    • Gait instability    • History of CVA (cerebrovascular accident)     w/ residual left-sided weakness   • Hyperlipidemia    • Hypertension    • Insomnia    • Obesity     BMI 32 82   • Pulmonary nodule     2cm   • Severe aortic stenosis    • Vitamin D deficiency        Past Surgical History:   Procedure Laterality Date   • CARDIAC CATHETERIZATION N/A 4/12/2022    Procedure: CARDIAC TAVR;  Surgeon: Natanael Javier DO;  Location: [facility];  Service: Cardiology   • CORONARY ARTERY BYPASS GRAFT  2004    x2 LIMA-LAD, SVG-RCA   • CO REPLACE AORTIC VALVE OPENFEMORAL ARTERY APPROACH N/A 4/12/2022    Procedure: REPLACEMENT AORTIC VALVE TRANSCATHETER (TAVR) TRANSFEMORAL W/ 26MM LOPEZ CHUCK S3 ULTRA VALVE(ACCESS ON LEFT) LORIE;  Surgeon: Magdiel Washington DO;  Location: [facility];  Service: Cardiac Surgery   • TONSILLECTOMY         Family History   Problem Relation Age of Onset   • Coronary artery disease Mother    • Arthritis "Mother    • Hypertension Mother    • Stroke Mother    • Other Father         aortic valve replacement; CABG   • Arthritis Father    • Hypertension Brother    • Stroke Brother          Medications have been verified  Objective   /78   Pulse 67   Temp 98 °F (36 7 °C)   Resp 18   Ht 5' 10\" (1 778 m)   Wt 105 kg (231 lb)   SpO2 97%   BMI 33 15 kg/m²        Physical Exam     Physical Exam  Constitutional:       General: He is not in acute distress  Appearance: He is not toxic-appearing  Cardiovascular:      Rate and Rhythm: Normal rate  Heart sounds: Normal heart sounds  Musculoskeletal:      Comments: Left lateral hip tender  Ecchymosis over left buttock  ROM limited due to pain  NVI distally    Skin:     General: Skin is warm and dry  Neurological:      Mental Status: He is alert                     "

## 2023-09-19 DIAGNOSIS — I11.0 HYPERTENSIVE HEART DISEASE WITH CHRONIC DIASTOLIC CONGESTIVE HEART FAILURE (HCC): Chronic | ICD-10-CM

## 2023-09-19 DIAGNOSIS — I50.32 HYPERTENSIVE HEART DISEASE WITH CHRONIC DIASTOLIC CONGESTIVE HEART FAILURE (HCC): Chronic | ICD-10-CM

## 2023-09-19 RX ORDER — AMLODIPINE BESYLATE 10 MG/1
10 TABLET ORAL DAILY
Qty: 90 TABLET | Refills: 1 | Status: SHIPPED | OUTPATIENT
Start: 2023-09-19

## 2023-09-20 DIAGNOSIS — I11.0 HYPERTENSIVE HEART DISEASE WITH CHRONIC DIASTOLIC CONGESTIVE HEART FAILURE (HCC): Chronic | ICD-10-CM

## 2023-09-20 DIAGNOSIS — I10 BENIGN ESSENTIAL HYPERTENSION: ICD-10-CM

## 2023-09-20 DIAGNOSIS — N40.1 BPH WITH OBSTRUCTION/LOWER URINARY TRACT SYMPTOMS: Chronic | ICD-10-CM

## 2023-09-20 DIAGNOSIS — E78.2 MIXED HYPERLIPIDEMIA: ICD-10-CM

## 2023-09-20 DIAGNOSIS — I50.32 HYPERTENSIVE HEART DISEASE WITH CHRONIC DIASTOLIC CONGESTIVE HEART FAILURE (HCC): Chronic | ICD-10-CM

## 2023-09-20 DIAGNOSIS — N13.8 BPH WITH OBSTRUCTION/LOWER URINARY TRACT SYMPTOMS: Chronic | ICD-10-CM

## 2023-09-20 RX ORDER — ATORVASTATIN CALCIUM 40 MG/1
40 TABLET, FILM COATED ORAL DAILY
Qty: 90 TABLET | Refills: 2 | Status: SHIPPED | OUTPATIENT
Start: 2023-09-20

## 2023-09-20 RX ORDER — FINASTERIDE 5 MG/1
5 TABLET, FILM COATED ORAL DAILY
Qty: 90 TABLET | Refills: 2 | Status: SHIPPED | OUTPATIENT
Start: 2023-09-20

## 2023-09-20 RX ORDER — CARVEDILOL 12.5 MG/1
12.5 TABLET ORAL 2 TIMES DAILY WITH MEALS
Qty: 60 TABLET | Refills: 0 | Status: SHIPPED | OUTPATIENT
Start: 2023-09-20

## 2023-09-20 RX ORDER — OLMESARTAN MEDOXOMIL AND HYDROCHLOROTHIAZIDE 40/25 40; 25 MG/1; MG/1
1 TABLET ORAL DAILY
Qty: 30 TABLET | Refills: 0 | Status: SHIPPED | OUTPATIENT
Start: 2023-09-20

## 2023-09-21 ENCOUNTER — RA CDI HCC (OUTPATIENT)
Dept: OTHER | Facility: HOSPITAL | Age: 78
End: 2023-09-21

## 2023-09-21 DIAGNOSIS — I50.32 HYPERTENSIVE HEART DISEASE WITH CHRONIC DIASTOLIC CONGESTIVE HEART FAILURE (HCC): Chronic | ICD-10-CM

## 2023-09-21 DIAGNOSIS — I11.0 HYPERTENSIVE HEART DISEASE WITH CHRONIC DIASTOLIC CONGESTIVE HEART FAILURE (HCC): Chronic | ICD-10-CM

## 2023-09-21 DIAGNOSIS — F51.02 ADJUSTMENT INSOMNIA: ICD-10-CM

## 2023-09-21 RX ORDER — AMLODIPINE BESYLATE 10 MG/1
10 TABLET ORAL DAILY
Qty: 90 TABLET | Refills: 0 | Status: CANCELLED | OUTPATIENT
Start: 2023-09-21

## 2023-09-21 RX ORDER — CARVEDILOL 12.5 MG/1
12.5 TABLET ORAL 2 TIMES DAILY WITH MEALS
Qty: 60 TABLET | Refills: 0 | Status: CANCELLED | OUTPATIENT
Start: 2023-09-21

## 2023-09-21 RX ORDER — MIRTAZAPINE 30 MG/1
30 TABLET, FILM COATED ORAL
Qty: 90 TABLET | Refills: 1 | Status: SHIPPED | OUTPATIENT
Start: 2023-09-21

## 2023-09-21 NOTE — PROGRESS NOTES
[address] coding opportunities       Chart reviewed, no opportunity found: [address] Review     Patients Insurance     Medicare Insurance: [employer]

## 2023-09-26 ENCOUNTER — RA CDI HCC (OUTPATIENT)
Dept: OTHER | Facility: HOSPITAL | Age: 78
End: 2023-09-26

## 2023-09-27 NOTE — PROGRESS NOTES
[address] [facility] coding opportunities     I70.0     Chart Reviewed number of suggestions sent to Provider: 1     GR    Patients Insurance     Medicare Insurance: [facility]

## 2023-10-13 DIAGNOSIS — I11.0 HYPERTENSIVE HEART DISEASE WITH CHRONIC DIASTOLIC CONGESTIVE HEART FAILURE (HCC): Chronic | ICD-10-CM

## 2023-10-13 DIAGNOSIS — I50.32 HYPERTENSIVE HEART DISEASE WITH CHRONIC DIASTOLIC CONGESTIVE HEART FAILURE (HCC): Chronic | ICD-10-CM

## 2023-10-13 RX ORDER — CARVEDILOL 12.5 MG/1
12.5 TABLET ORAL 2 TIMES DAILY WITH MEALS
Qty: 180 TABLET | Refills: 0 | Status: SHIPPED | OUTPATIENT
Start: 2023-10-13

## 2023-11-29 DIAGNOSIS — F51.02 ADJUSTMENT INSOMNIA: ICD-10-CM

## 2023-11-29 DIAGNOSIS — N13.8 BPH WITH OBSTRUCTION/LOWER URINARY TRACT SYMPTOMS: Chronic | ICD-10-CM

## 2023-11-29 DIAGNOSIS — N40.1 BPH WITH OBSTRUCTION/LOWER URINARY TRACT SYMPTOMS: Chronic | ICD-10-CM

## 2023-11-29 DIAGNOSIS — I10 BENIGN ESSENTIAL HYPERTENSION: ICD-10-CM

## 2023-11-29 DIAGNOSIS — E78.2 MIXED HYPERLIPIDEMIA: ICD-10-CM

## 2023-11-29 DIAGNOSIS — I50.32 HYPERTENSIVE HEART DISEASE WITH CHRONIC DIASTOLIC CONGESTIVE HEART FAILURE (HCC): Chronic | ICD-10-CM

## 2023-11-29 DIAGNOSIS — I25.10 CORONARY ARTERY DISEASE: ICD-10-CM

## 2023-11-29 DIAGNOSIS — I11.0 HYPERTENSIVE HEART DISEASE WITH CHRONIC DIASTOLIC CONGESTIVE HEART FAILURE (HCC): Chronic | ICD-10-CM

## 2023-11-29 RX ORDER — CLOPIDOGREL BISULFATE 75 MG/1
75 TABLET ORAL DAILY
Qty: 90 TABLET | Refills: 0 | Status: SHIPPED | OUTPATIENT
Start: 2023-11-29

## 2023-11-29 RX ORDER — CARVEDILOL 12.5 MG/1
12.5 TABLET ORAL 2 TIMES DAILY WITH MEALS
Qty: 180 TABLET | Refills: 0 | Status: SHIPPED | OUTPATIENT
Start: 2023-11-29

## 2023-11-29 RX ORDER — OLMESARTAN MEDOXOMIL AND HYDROCHLOROTHIAZIDE 40/25 40; 25 MG/1; MG/1
1 TABLET ORAL DAILY
Qty: 90 TABLET | Refills: 0 | Status: SHIPPED | OUTPATIENT
Start: 2023-11-29

## 2023-11-29 RX ORDER — FINASTERIDE 5 MG/1
5 TABLET, FILM COATED ORAL DAILY
Qty: 90 TABLET | Refills: 0 | Status: SHIPPED | OUTPATIENT
Start: 2023-11-29

## 2023-11-29 RX ORDER — ATORVASTATIN CALCIUM 40 MG/1
40 TABLET, FILM COATED ORAL DAILY
Qty: 90 TABLET | Refills: 0 | Status: SHIPPED | OUTPATIENT
Start: 2023-11-29

## 2023-11-29 RX ORDER — MIRTAZAPINE 30 MG/1
30 TABLET, FILM COATED ORAL
Qty: 90 TABLET | Refills: 0 | Status: SHIPPED | OUTPATIENT
Start: 2023-11-29

## 2024-01-02 ENCOUNTER — TELEPHONE (OUTPATIENT)
Age: 79
End: 2024-01-02

## 2024-01-02 NOTE — TELEPHONE ENCOUNTER
I called marion to schedule his medicare wellness visit marion stated that he moved to [place] he will no longer see doctor ramsey

## 2024-01-11 DIAGNOSIS — I11.0 HYPERTENSIVE HEART DISEASE WITH CHRONIC DIASTOLIC CONGESTIVE HEART FAILURE (HCC): Chronic | ICD-10-CM

## 2024-01-11 DIAGNOSIS — I25.10 CORONARY ARTERY DISEASE: ICD-10-CM

## 2024-01-11 DIAGNOSIS — I50.32 HYPERTENSIVE HEART DISEASE WITH CHRONIC DIASTOLIC CONGESTIVE HEART FAILURE (HCC): Chronic | ICD-10-CM

## 2024-01-11 RX ORDER — AMLODIPINE BESYLATE 10 MG/1
10 TABLET ORAL DAILY
Qty: 90 TABLET | Refills: 0 | Status: SHIPPED | OUTPATIENT
Start: 2024-01-11 | End: 2024-01-15 | Stop reason: SDUPTHER

## 2024-01-11 RX ORDER — CLOPIDOGREL BISULFATE 75 MG/1
75 TABLET ORAL DAILY
Qty: 90 TABLET | Refills: 0 | Status: SHIPPED | OUTPATIENT
Start: 2024-01-11 | End: 2024-01-15 | Stop reason: SDUPTHER

## 2024-01-15 DIAGNOSIS — I50.32 HYPERTENSIVE HEART DISEASE WITH CHRONIC DIASTOLIC CONGESTIVE HEART FAILURE (HCC): Chronic | ICD-10-CM

## 2024-01-15 DIAGNOSIS — N40.1 BPH WITH OBSTRUCTION/LOWER URINARY TRACT SYMPTOMS: Chronic | ICD-10-CM

## 2024-01-15 DIAGNOSIS — F51.02 ADJUSTMENT INSOMNIA: ICD-10-CM

## 2024-01-15 DIAGNOSIS — I11.0 HYPERTENSIVE HEART DISEASE WITH CHRONIC DIASTOLIC CONGESTIVE HEART FAILURE (HCC): Chronic | ICD-10-CM

## 2024-01-15 DIAGNOSIS — I25.10 CORONARY ARTERY DISEASE: ICD-10-CM

## 2024-01-15 DIAGNOSIS — N13.8 BPH WITH OBSTRUCTION/LOWER URINARY TRACT SYMPTOMS: Chronic | ICD-10-CM

## 2024-01-15 DIAGNOSIS — E78.2 MIXED HYPERLIPIDEMIA: ICD-10-CM

## 2024-01-15 DIAGNOSIS — I10 BENIGN ESSENTIAL HYPERTENSION: ICD-10-CM

## 2024-01-15 RX ORDER — ATORVASTATIN CALCIUM 40 MG/1
40 TABLET, FILM COATED ORAL DAILY
Qty: 90 TABLET | Refills: 0 | Status: SHIPPED | OUTPATIENT
Start: 2024-01-15

## 2024-01-15 RX ORDER — FINASTERIDE 5 MG/1
5 TABLET, FILM COATED ORAL DAILY
Qty: 90 TABLET | Refills: 1 | Status: SHIPPED | OUTPATIENT
Start: 2024-01-15

## 2024-01-15 RX ORDER — AMLODIPINE BESYLATE 10 MG/1
10 TABLET ORAL DAILY
Qty: 90 TABLET | Refills: 0 | Status: SHIPPED | OUTPATIENT
Start: 2024-01-15

## 2024-01-15 RX ORDER — CARVEDILOL 12.5 MG/1
12.5 TABLET ORAL 2 TIMES DAILY WITH MEALS
Qty: 180 TABLET | Refills: 0 | Status: SHIPPED | OUTPATIENT
Start: 2024-01-15

## 2024-01-15 RX ORDER — MIRTAZAPINE 30 MG/1
30 TABLET, FILM COATED ORAL
Qty: 90 TABLET | Refills: 0 | Status: SHIPPED | OUTPATIENT
Start: 2024-01-15

## 2024-01-15 RX ORDER — CLOPIDOGREL BISULFATE 75 MG/1
75 TABLET ORAL DAILY
Qty: 90 TABLET | Refills: 1 | Status: SHIPPED | OUTPATIENT
Start: 2024-01-15

## 2024-01-15 RX ORDER — OLMESARTAN MEDOXOMIL AND HYDROCHLOROTHIAZIDE 40/25 40; 25 MG/1; MG/1
1 TABLET ORAL DAILY
Qty: 90 TABLET | Refills: 0 | Status: SHIPPED | OUTPATIENT
Start: 2024-01-15 | End: 2024-01-19 | Stop reason: SDUPTHER

## 2024-01-19 ENCOUNTER — TELEPHONE (OUTPATIENT)
Age: 79
End: 2024-01-19

## 2024-01-19 RX ORDER — OLMESARTAN MEDOXOMIL AND HYDROCHLOROTHIAZIDE 40/25 40; 25 MG/1; MG/1
1 TABLET ORAL DAILY
Qty: 90 TABLET | Refills: 0 | Status: SHIPPED | OUTPATIENT
Start: 2024-01-19

## 2024-01-19 NOTE — TELEPHONE ENCOUNTER
Left message    Crystal from [facility]. Our phone number is [phone]. I'm calling in regards to a refill request for Olmesartan hydrochlorothiazide 40 slash 25. If you could please call in your fax and new prescription would be happy to get that to the member. Our phone number again is [phone]. Our fax is [phone]. Our escribe is [phone]. After this call, a fax will be sent out to your office. Thank you and have a great day.

## 2024-01-31 ENCOUNTER — TELEPHONE (OUTPATIENT)
Dept: CARDIOLOGY CLINIC | Facility: CLINIC | Age: 79
End: 2024-01-31

## 2024-01-31 NOTE — TELEPHONE ENCOUNTER
Received fax for medical records from [facility].    Scanned into chart.

## 2024-02-29 DIAGNOSIS — I50.32 HYPERTENSIVE HEART DISEASE WITH CHRONIC DIASTOLIC CONGESTIVE HEART FAILURE (HCC): Chronic | ICD-10-CM

## 2024-02-29 DIAGNOSIS — I11.0 HYPERTENSIVE HEART DISEASE WITH CHRONIC DIASTOLIC CONGESTIVE HEART FAILURE (HCC): Chronic | ICD-10-CM

## 2024-02-29 RX ORDER — AMLODIPINE BESYLATE 10 MG/1
10 TABLET ORAL DAILY
Qty: 90 TABLET | Refills: 0 | Status: SHIPPED | OUTPATIENT
Start: 2024-02-29

## 2024-06-01 DIAGNOSIS — N40.1 BPH WITH OBSTRUCTION/LOWER URINARY TRACT SYMPTOMS: Chronic | ICD-10-CM

## 2024-06-01 DIAGNOSIS — N13.8 BPH WITH OBSTRUCTION/LOWER URINARY TRACT SYMPTOMS: Chronic | ICD-10-CM

## 2024-06-01 RX ORDER — FINASTERIDE 5 MG/1
5 TABLET, FILM COATED ORAL DAILY
Qty: 90 TABLET | Refills: 3 | OUTPATIENT
Start: 2024-06-01

## (undated) DEVICE — INTENDED FOR TISSUE SEPARATION, AND OTHER PROCEDURES THAT REQUIRE A SHARP SURGICAL BLADE TO PUNCTURE OR CUT.: Brand: BARD-PARKER ® CARBON RIB-BACK BLADES

## (undated) DEVICE — SWAN-GANZ BIPOLAR PACING CATHETER: Brand: SWAN-GANZ

## (undated) DEVICE — HEAVY DUTY TABLE COVER: Brand: CONVERTORS

## (undated) DEVICE — AMPLATZ EXTRA STIFF WIRE GUIDE: Brand: AMPLATZ

## (undated) DEVICE — Device

## (undated) DEVICE — CARDIOVASCULAR SPLIT DRAPE: Brand: CONVERTORS

## (undated) DEVICE — GAUZE SPONGES,USP TYPE VII GAUZE, 12 PLY: Brand: CURITY

## (undated) DEVICE — CATH DIAG 5FR IMPULSE 100CM FR4

## (undated) DEVICE — DGW .035 FC J3MM 150CM TEF: Brand: EMERALD

## (undated) DEVICE — DRESSING ALLEVYN LIFE SACRAL 6.75 X 6.5 IN

## (undated) DEVICE — 3M™ TEGADERM™ TRANSPARENT FILM DRESSING FRAME STYLE, 1626W, 4 IN X 4-3/4 IN (10 CM X 12 CM), 50/CT 4CT/CASE: Brand: 3M™ TEGADERM™

## (undated) DEVICE — PINNACLE INTRODUCER SHEATH: Brand: PINNACLE

## (undated) DEVICE — TRAY FOLEY 16FR SURESTEP TEMP SENS URIMETER STAT LOK

## (undated) DEVICE — CATH DIAG 5FR IMPULSE 110CM PIG

## (undated) DEVICE — BETHLEHEM MAJOR GENERAL PACK: Brand: CARDINAL HEALTH

## (undated) DEVICE — X-RAY DETECTABLE SPONGES,16 PLY: Brand: VISTEC

## (undated) DEVICE — THERMOFLECT BLANKET, L, 25EA                               TS THERMOFLECT BLANKET, 48" X 84", SILVER, 5/BG, 5 BG/CS NW: Brand: THERMOFLECT

## (undated) DEVICE — GUIDEWIRE LUNDERQUIST TSCMG-35-300-LESDC

## (undated) DEVICE — DEFIB ADULT ELECTRODE CARDINAL

## (undated) DEVICE — SUT SILK 0 CT-1 30 IN 424H

## (undated) DEVICE — DGW .035 FC STR 260CM TEF: Brand: EMERALD

## (undated) DEVICE — GLOVE INDICATOR PI UNDERGLOVE SZ 7 BLUE

## (undated) DEVICE — MICROPUNCTURE INTRODUCER SET SILHOUETTE TRANSITIONLESS PUSH-PLUS DESIGN - STIFFENED CANNULA WITH NITINOL WIRE GUIDE: Brand: MICROPUNCTURE

## (undated) DEVICE — 3000CC GUARDIAN II: Brand: GUARDIAN

## (undated) DEVICE — PANNUS RETENTION SYSTEM

## (undated) DEVICE — ADHESIVE SKIN HIGH VISCOSITY EXOFIN 1ML

## (undated) DEVICE — CARDIO PERI-GROIN: Brand: CONVERTORS

## (undated) DEVICE — GLOVE SRG BIOGEL ECLIPSE 7

## (undated) DEVICE — PAD GROUNDING ADULT